# Patient Record
Sex: MALE | Race: WHITE | NOT HISPANIC OR LATINO | Employment: UNEMPLOYED | URBAN - METROPOLITAN AREA
[De-identification: names, ages, dates, MRNs, and addresses within clinical notes are randomized per-mention and may not be internally consistent; named-entity substitution may affect disease eponyms.]

---

## 2017-01-17 ENCOUNTER — OFFICE VISIT (OUTPATIENT)
Dept: INTERNAL MEDICINE | Facility: CLINIC | Age: 32
End: 2017-01-17

## 2017-01-17 VITALS
BODY MASS INDEX: 38.04 KG/M2 | DIASTOLIC BLOOD PRESSURE: 76 MMHG | HEIGHT: 68 IN | HEART RATE: 68 BPM | WEIGHT: 251 LBS | SYSTOLIC BLOOD PRESSURE: 120 MMHG

## 2017-01-17 DIAGNOSIS — R60.0 BILATERAL EDEMA OF LOWER EXTREMITY: ICD-10-CM

## 2017-01-17 DIAGNOSIS — G89.29 CHRONIC PAIN OF BOTH KNEES: ICD-10-CM

## 2017-01-17 DIAGNOSIS — F32.89 OTHER DEPRESSION: ICD-10-CM

## 2017-01-17 DIAGNOSIS — F41.9 ANXIETY: ICD-10-CM

## 2017-01-17 DIAGNOSIS — K21.9 GASTROESOPHAGEAL REFLUX DISEASE WITHOUT ESOPHAGITIS: Primary | ICD-10-CM

## 2017-01-17 DIAGNOSIS — F20.89 OTHER SCHIZOPHRENIA (HCC): ICD-10-CM

## 2017-01-17 DIAGNOSIS — F31.32 BIPOLAR AFFECTIVE DISORDER, CURRENTLY DEPRESSED, MODERATE (HCC): ICD-10-CM

## 2017-01-17 DIAGNOSIS — M25.561 CHRONIC PAIN OF BOTH KNEES: ICD-10-CM

## 2017-01-17 DIAGNOSIS — M25.562 CHRONIC PAIN OF BOTH KNEES: ICD-10-CM

## 2017-01-17 PROCEDURE — 99213 OFFICE O/P EST LOW 20 MIN: CPT | Performed by: INTERNAL MEDICINE

## 2017-01-17 NOTE — PROGRESS NOTES
Patient is a 31 y.o. male who is here for a follow up of chronic conditions.  Chief Complaint   Patient presents with   • Anxiety   • Depression         HPI:  Here for f/u.  Doing good.  Has lost weight sec to better diet.  GERD is controlled.  Edema is good.  No change in psych medicines.  Sleeps a lot.  Constipation is good.      History:    Patient Active Problem List   Diagnosis   • Anxiety   • Bipolar affective disorder   • Depression   • Gastroesophageal reflux disease without esophagitis   • Knee pain   • Schizophrenia   • Left-sided chest wall pain   • Bilateral edema of lower extremity       Past Medical History   Diagnosis Date   • Orchitis    • Plantar fasciitis        Past Surgical History   Procedure Laterality Date   • Leg surgery  05/03/2013       Current Outpatient Prescriptions on File Prior to Visit   Medication Sig   • ARIPiprazole (ABILIFY) 20 MG tablet    • benztropine (COGENTIN) 1 MG tablet Take 1 tablet by mouth 2 (two) times a day.   • buPROPion XL (WELLBUTRIN XL) 150 MG 24 hr tablet Take 150 mg by mouth daily.   • diclofenac (CATAFLAM) 50 MG tablet TAKE 1 TABLET EVERY 8 HOURS AS NEEDED FOR PAIN   • fluphenazine decanoate (PROLIXIN) 25 MG/ML injection    • gabapentin (NEURONTIN) 400 MG capsule Take 1 capsule by mouth 2 (two) times a day.   • hydrOXYzine (ATARAX) 25 MG tablet Take 1 tablet by mouth. Every 4-6 hours as needed   • omeprazole (PriLOSEC) 40 MG capsule Take 1 capsule by mouth daily.   • ondansetron (ZOFRAN) 4 MG tablet Take 1 tablet by mouth every 6 (six) hours as needed.   • triamterene-hydrochlorothiazide (MAXZIDE-25) 37.5-25 MG per tablet Take 1 tablet by mouth Daily As Needed (edema).     No current facility-administered medications on file prior to visit.        Family History   Problem Relation Age of Onset   • Diabetes Mother    • Hypertension Mother    • Atrial fibrillation Father    • Colon cancer Sister        Social History     Social History   • Marital status: Single  "    Spouse name: N/A   • Number of children: N/A   • Years of education: N/A     Occupational History   • Not on file.     Social History Main Topics   • Smoking status: Never Smoker   • Smokeless tobacco: Not on file   • Alcohol use Not on file   • Drug use: Not on file   • Sexual activity: Not on file     Other Topics Concern   • Not on file     Social History Narrative         ROS:    Review of Systems   Constitutional: Positive for fatigue. Negative for chills, diaphoresis, fever and unexpected weight change.   HENT: Negative for congestion, ear pain, hearing loss, nosebleeds, postnasal drip, sinus pressure and sore throat.    Eyes: Negative for pain, discharge and itching.   Respiratory: Negative for cough, chest tightness, shortness of breath and wheezing.    Cardiovascular: Positive for leg swelling. Negative for chest pain and palpitations.   Gastrointestinal: Negative for abdominal distention, abdominal pain, blood in stool, constipation, diarrhea, nausea and vomiting.        10/15 colonoscopy normal   Endocrine: Negative for polydipsia and polyuria.   Genitourinary: Negative for difficulty urinating, dysuria, frequency and hematuria.   Musculoskeletal: Positive for arthralgias, back pain and joint swelling. Negative for gait problem and myalgias.   Skin: Negative for rash and wound.   Neurological: Negative for dizziness, syncope, weakness and headaches.   Psychiatric/Behavioral: Positive for behavioral problems, decreased concentration and sleep disturbance. Negative for dysphoric mood. The patient is nervous/anxious.        Visit Vitals   • /76   • Pulse 68   • Ht 68\" (172.7 cm)   • Wt 251 lb (114 kg)   • BMI 38.16 kg/m2       Physical Exam:    Physical Exam   Constitutional: He is oriented to person, place, and time. He appears well-developed and well-nourished.   HENT:   Head: Normocephalic and atraumatic.   Right Ear: External ear normal.   Left Ear: External ear normal.   Mouth/Throat: " Oropharynx is clear and moist.   Eyes: Conjunctivae and EOM are normal.   Neck: Normal range of motion. Neck supple.   Cardiovascular: Normal rate, regular rhythm and normal heart sounds.    Pulmonary/Chest: Effort normal and breath sounds normal.   Abdominal: Soft. Bowel sounds are normal.   Musculoskeletal: Normal range of motion. He exhibits edema (trace edema).   Lymphadenopathy:     He has no cervical adenopathy.   Neurological: He is alert and oriented to person, place, and time.   Skin: Skin is warm and dry.   Psychiatric: He has a normal mood and affect. His behavior is normal.       Procedure:      Discussion/Summary:  knee pain-diclofenac prn, advised not to use any otc meds  gerd-cont pepcid  high risk meds-labs noted  bipolor/schizophrenia-advised compliance with meds   constipation-colace bid, increase fiber  nausea-better  Edema-advised to see if [facility] can reduce meds, cont maxzide  Abnormal lft-recheck on rtc, RUQ noted      Labs noted and dw patient  Call [phone]      Current Outpatient Prescriptions:   •  ARIPiprazole (ABILIFY) 20 MG tablet, , Disp: , Rfl:   •  benztropine (COGENTIN) 1 MG tablet, Take 1 tablet by mouth 2 (two) times a day., Disp: , Rfl:   •  buPROPion XL (WELLBUTRIN XL) 150 MG 24 hr tablet, Take 150 mg by mouth daily., Disp: , Rfl:   •  diclofenac (CATAFLAM) 50 MG tablet, TAKE 1 TABLET EVERY 8 HOURS AS NEEDED FOR PAIN, Disp: , Rfl: 0  •  fluphenazine decanoate (PROLIXIN) 25 MG/ML injection, , Disp: , Rfl:   •  gabapentin (NEURONTIN) 400 MG capsule, Take 1 capsule by mouth 2 (two) times a day., Disp: , Rfl:   •  hydrOXYzine (ATARAX) 25 MG tablet, Take 1 tablet by mouth. Every 4-6 hours as needed, Disp: , Rfl:   •  omeprazole (PriLOSEC) 40 MG capsule, Take 1 capsule by mouth daily., Disp: 30 capsule, Rfl: 2  •  ondansetron (ZOFRAN) 4 MG tablet, Take 1 tablet by mouth every 6 (six) hours as needed., Disp: , Rfl:   •  triamterene-hydrochlorothiazide (MAXZIDE-25) 37.5-25 MG per  tablet, Take 1 tablet by mouth Daily As Needed (edema)., Disp: 30 tablet, Rfl: 2        Coretta was seen today for anxiety and depression.    Diagnoses and all orders for this visit:    Gastroesophageal reflux disease without esophagitis    Chronic pain of both knees    Anxiety    Bilateral edema of lower extremity    Bipolar affective disorder, currently depressed, moderate    Other depression    Other schizophrenia

## 2017-04-14 ENCOUNTER — OFFICE VISIT (OUTPATIENT)
Dept: INTERNAL MEDICINE | Facility: CLINIC | Age: 32
End: 2017-04-14

## 2017-04-14 VITALS
DIASTOLIC BLOOD PRESSURE: 78 MMHG | HEART RATE: 68 BPM | BODY MASS INDEX: 37.28 KG/M2 | SYSTOLIC BLOOD PRESSURE: 126 MMHG | WEIGHT: 246 LBS | HEIGHT: 68 IN

## 2017-04-14 DIAGNOSIS — K21.9 GASTROESOPHAGEAL REFLUX DISEASE WITHOUT ESOPHAGITIS: Primary | ICD-10-CM

## 2017-04-14 DIAGNOSIS — F31.32 BIPOLAR AFFECTIVE DISORDER, CURRENTLY DEPRESSED, MODERATE (HCC): ICD-10-CM

## 2017-04-14 DIAGNOSIS — F20.89 OTHER SCHIZOPHRENIA (HCC): ICD-10-CM

## 2017-04-14 DIAGNOSIS — R60.0 BILATERAL EDEMA OF LOWER EXTREMITY: ICD-10-CM

## 2017-04-14 DIAGNOSIS — F32.89 OTHER DEPRESSION: ICD-10-CM

## 2017-04-14 DIAGNOSIS — M25.562 CHRONIC PAIN OF BOTH KNEES: ICD-10-CM

## 2017-04-14 DIAGNOSIS — M25.561 CHRONIC PAIN OF BOTH KNEES: ICD-10-CM

## 2017-04-14 DIAGNOSIS — F41.9 ANXIETY: ICD-10-CM

## 2017-04-14 DIAGNOSIS — G89.29 CHRONIC PAIN OF BOTH KNEES: ICD-10-CM

## 2017-04-14 DIAGNOSIS — R74.8 ABNORMAL LIVER ENZYMES: ICD-10-CM

## 2017-04-14 LAB
ALBUMIN SERPL-MCNC: 4.7 G/DL (ref 3.2–4.8)
ALBUMIN/GLOB SERPL: 1.5 G/DL (ref 1.5–2.5)
ALP SERPL-CCNC: 93 U/L (ref 25–100)
ALT SERPL W P-5'-P-CCNC: 47 U/L (ref 7–40)
ANION GAP SERPL CALCULATED.3IONS-SCNC: 21 MMOL/L (ref 3–11)
AST SERPL-CCNC: 29 U/L (ref 0–33)
BILIRUB SERPL-MCNC: 0.8 MG/DL (ref 0.3–1.2)
BUN BLD-MCNC: 15 MG/DL (ref 9–23)
BUN/CREAT SERPL: 15 (ref 7–25)
CALCIUM SPEC-SCNC: 10.4 MG/DL (ref 8.7–10.4)
CHLORIDE SERPL-SCNC: 101 MMOL/L (ref 99–109)
CO2 SERPL-SCNC: 17 MMOL/L (ref 20–31)
CREAT BLD-MCNC: 1 MG/DL (ref 0.6–1.3)
FERRITIN SERPL-MCNC: 98 NG/ML (ref 22–322)
GFR SERPL CREATININE-BSD FRML MDRD: 86 ML/MIN/1.73
GLOBULIN UR ELPH-MCNC: 3.1 GM/DL
GLUCOSE BLD-MCNC: 116 MG/DL (ref 70–100)
HAV IGM SERPL QL IA: NORMAL
HBV CORE IGM SERPL QL IA: NORMAL
HBV SURFACE AG SERPL QL IA: NORMAL
HCV AB SER DONR QL: NORMAL
IRON 24H UR-MRATE: 98 MCG/DL (ref 50–175)
IRON SATN MFR SERPL: 26 % (ref 20–50)
POTASSIUM BLD-SCNC: 4.4 MMOL/L (ref 3.5–5.5)
PROT SERPL-MCNC: 7.8 G/DL (ref 5.7–8.2)
SODIUM BLD-SCNC: 139 MMOL/L (ref 132–146)
TIBC SERPL-MCNC: 379 MCG/DL (ref 250–450)

## 2017-04-14 PROCEDURE — 83516 IMMUNOASSAY NONANTIBODY: CPT | Performed by: INTERNAL MEDICINE

## 2017-04-14 PROCEDURE — 86038 ANTINUCLEAR ANTIBODIES: CPT | Performed by: INTERNAL MEDICINE

## 2017-04-14 PROCEDURE — 83550 IRON BINDING TEST: CPT | Performed by: INTERNAL MEDICINE

## 2017-04-14 PROCEDURE — 82728 ASSAY OF FERRITIN: CPT | Performed by: INTERNAL MEDICINE

## 2017-04-14 PROCEDURE — 82390 ASSAY OF CERULOPLASMIN: CPT | Performed by: INTERNAL MEDICINE

## 2017-04-14 PROCEDURE — 80053 COMPREHEN METABOLIC PANEL: CPT | Performed by: INTERNAL MEDICINE

## 2017-04-14 PROCEDURE — 80074 ACUTE HEPATITIS PANEL: CPT | Performed by: INTERNAL MEDICINE

## 2017-04-14 PROCEDURE — 82103 ALPHA-1-ANTITRYPSIN TOTAL: CPT | Performed by: INTERNAL MEDICINE

## 2017-04-14 PROCEDURE — 99214 OFFICE O/P EST MOD 30 MIN: CPT | Performed by: INTERNAL MEDICINE

## 2017-04-14 PROCEDURE — 83540 ASSAY OF IRON: CPT | Performed by: INTERNAL MEDICINE

## 2017-04-14 RX ORDER — ARIPIPRAZOLE 15 MG/1
TABLET ORAL
COMMUNITY
Start: 2017-03-22 | End: 2017-04-14

## 2017-04-14 NOTE — PROGRESS NOTES
Patient is a 32 y.o. male who is here for a follow up of chronic conditions.  Chief Complaint   Patient presents with   • Pain   • Anxiety         HPI:  Here for f/u.  Feels sad bc he lost his job.  Energy level is good.  No nausea or emesis.  Sleeps a lot.  No abdominal pains.  Occasional right knee pains.  No dizziness or lightheadedness.     History:    Patient Active Problem List   Diagnosis   • Anxiety   • Bipolar affective disorder   • Depression   • Gastroesophageal reflux disease without esophagitis   • Knee pain   • Schizophrenia   • Left-sided chest wall pain   • Bilateral edema of lower extremity       Past Medical History:   Diagnosis Date   • Orchitis    • Plantar fasciitis        Past Surgical History:   Procedure Laterality Date   • LEG SURGERY  05/03/2013    Right Knee       Current Outpatient Prescriptions on File Prior to Visit   Medication Sig   • ARIPiprazole (ABILIFY) 20 MG tablet    • benztropine (COGENTIN) 1 MG tablet Take 1 tablet by mouth 2 (two) times a day.   • buPROPion XL (WELLBUTRIN XL) 150 MG 24 hr tablet Take 150 mg by mouth daily.   • diclofenac (CATAFLAM) 50 MG tablet TAKE 1 TABLET EVERY 8 HOURS AS NEEDED FOR PAIN   • fluphenazine decanoate (PROLIXIN) 25 MG/ML injection    • gabapentin (NEURONTIN) 400 MG capsule Take 1 capsule by mouth 2 (two) times a day.   • hydrOXYzine (ATARAX) 25 MG tablet Take 1 tablet by mouth. Every 4-6 hours as needed   • omeprazole (PriLOSEC) 40 MG capsule Take 1 capsule by mouth daily.   • ondansetron (ZOFRAN) 4 MG tablet Take 1 tablet by mouth every 6 (six) hours as needed.   • triamterene-hydrochlorothiazide (MAXZIDE-25) 37.5-25 MG per tablet Take 1 tablet by mouth Daily As Needed (edema).     No current facility-administered medications on file prior to visit.        Family History   Problem Relation Age of Onset   • Diabetes Mother    • Hypertension Mother    • Atrial fibrillation Father    • Colon cancer Sister        Social History     Social History  "  • Marital status: Single     Spouse name: N/A   • Number of children: N/A   • Years of education: N/A     Occupational History   • Not on file.     Social History Main Topics   • Smoking status: Never Smoker   • Smokeless tobacco: Not on file   • Alcohol use Not on file   • Drug use: Not on file   • Sexual activity: Not on file     Other Topics Concern   • Not on file     Social History Narrative         ROS:    Review of Systems   Constitutional: Positive for fatigue. Negative for chills, diaphoresis, fever and unexpected weight change.   HENT: Negative for congestion, ear pain, hearing loss, nosebleeds, postnasal drip, sinus pressure and sore throat.    Eyes: Negative for pain, discharge and itching.   Respiratory: Negative for cough, chest tightness, shortness of breath and wheezing.    Cardiovascular: Positive for leg swelling. Negative for chest pain and palpitations.   Gastrointestinal: Negative for abdominal distention, abdominal pain, blood in stool, constipation, diarrhea, nausea and vomiting.        10/15 colonoscopy normal   Endocrine: Negative for polydipsia and polyuria.   Genitourinary: Negative for difficulty urinating, dysuria, frequency and hematuria.   Musculoskeletal: Positive for arthralgias, back pain and joint swelling. Negative for gait problem and myalgias.        Knee pain on R>L   Skin: Negative for rash and wound.   Neurological: Negative for dizziness, syncope, weakness and headaches.   Psychiatric/Behavioral: Positive for behavioral problems, decreased concentration and sleep disturbance. Negative for dysphoric mood. The patient is nervous/anxious.        /78  Pulse 68  Ht 68\" (172.7 cm)  Wt 246 lb (112 kg)  BMI 37.4 kg/m2    Physical Exam:    Physical Exam   Constitutional: He is oriented to person, place, and time. He appears well-developed and well-nourished.   HENT:   Head: Normocephalic and atraumatic.   Right Ear: External ear normal.   Left Ear: External ear normal. " "  Mouth/Throat: Oropharynx is clear and moist.   Eyes: Conjunctivae and EOM are normal.   Neck: Normal range of motion. Neck supple.   Cardiovascular: Normal rate, regular rhythm and normal heart sounds.    Pulmonary/Chest: Effort normal and breath sounds normal.   Abdominal: Soft. Bowel sounds are normal.   Musculoskeletal: Normal range of motion. He exhibits edema (trace edema).   R>L Knee crepitus, moderate   Lymphadenopathy:     He has no cervical adenopathy.   Neurological: He is alert and oriented to person, place, and time.   Skin: Skin is warm and dry.   Psychiatric: He has a normal mood and affect. His behavior is normal.       Procedure:      Discussion/Summary:  knee pain-diclofenac prn, advised not to use any otc meds, trial of \"move free joint health\"  gerd-cont pepcid  high risk meds-labs noted  bipolor/schizophrenia-advised compliance with meds   constipation-colace bid, increase fiber  nausea-better  Edema-advised to see if [facility] can reduce meds, cont maxzide  Abnormal lft-workup today, RUQ noted      Labs noted and dw patient  Call [phone]      Current Outpatient Prescriptions:   •  ARIPiprazole (ABILIFY) 20 MG tablet, , Disp: , Rfl:   •  benztropine (COGENTIN) 1 MG tablet, Take 1 tablet by mouth 2 (two) times a day., Disp: , Rfl:   •  buPROPion XL (WELLBUTRIN XL) 150 MG 24 hr tablet, Take 150 mg by mouth daily., Disp: , Rfl:   •  diclofenac (CATAFLAM) 50 MG tablet, TAKE 1 TABLET EVERY 8 HOURS AS NEEDED FOR PAIN, Disp: , Rfl: 0  •  fluphenazine decanoate (PROLIXIN) 25 MG/ML injection, , Disp: , Rfl:   •  gabapentin (NEURONTIN) 400 MG capsule, Take 1 capsule by mouth 2 (two) times a day., Disp: , Rfl:   •  hydrOXYzine (ATARAX) 25 MG tablet, Take 1 tablet by mouth. Every 4-6 hours as needed, Disp: , Rfl:   •  omeprazole (PriLOSEC) 40 MG capsule, Take 1 capsule by mouth daily., Disp: 30 capsule, Rfl: 2  •  ondansetron (ZOFRAN) 4 MG tablet, Take 1 tablet by mouth every 6 (six) hours as needed., Disp: " , Rfl:   •  triamterene-hydrochlorothiazide (MAXZIDE-25) 37.5-25 MG per tablet, Take 1 tablet by mouth Daily As Needed (edema)., Disp: 30 tablet, Rfl: 2        Coretta was seen today for pain and anxiety.    Diagnoses and all orders for this visit:    Gastroesophageal reflux disease without esophagitis    Chronic pain of both knees    Anxiety    Bilateral edema of lower extremity    Bipolar affective disorder, currently depressed, moderate    Other depression    Other schizophrenia    Abnormal liver enzymes  -     Comprehensive Metabolic Panel  -     Alpha - 1 - Antitrypsin  -     XIANG With / DsDNA, RNP, Sjogrens A / B, Smith  -     Anti-Smooth Muscle Antibody Titer  -     Ceruloplasmin  -     Ferritin  -     Cancel: Iron  -     Hepatitis Panel, Acute  -     Mitochondrial Antibodies, M2  -     Iron Profile  -     Tissue Transglutaminase, IgA

## 2017-04-15 LAB
A1AT SERPL-MCNC: 137 MG/DL (ref 90–200)
DEPRECATED MITOCHONDRIA M2 IGG SER-ACNC: <20 UNITS (ref 0–20)
TTG IGA SER-ACNC: <2 U/ML (ref 0–3)

## 2017-04-17 LAB — ANA SER QL: NEGATIVE

## 2017-04-18 LAB
ACTIN IGG SERPL-ACNC: 7 UNITS (ref 0–19)
CERULOPLASMIN SERPL-MCNC: 35.9 MG/DL (ref 16–31)

## 2017-07-18 ENCOUNTER — OFFICE VISIT (OUTPATIENT)
Dept: INTERNAL MEDICINE | Facility: CLINIC | Age: 32
End: 2017-07-18

## 2017-07-18 VITALS
BODY MASS INDEX: 36.53 KG/M2 | DIASTOLIC BLOOD PRESSURE: 78 MMHG | SYSTOLIC BLOOD PRESSURE: 110 MMHG | HEIGHT: 68 IN | HEART RATE: 68 BPM | WEIGHT: 241 LBS

## 2017-07-18 DIAGNOSIS — K21.9 GASTROESOPHAGEAL REFLUX DISEASE WITHOUT ESOPHAGITIS: Primary | ICD-10-CM

## 2017-07-18 DIAGNOSIS — G89.29 CHRONIC PAIN OF BOTH KNEES: ICD-10-CM

## 2017-07-18 DIAGNOSIS — M25.561 CHRONIC PAIN OF BOTH KNEES: ICD-10-CM

## 2017-07-18 DIAGNOSIS — R60.0 BILATERAL EDEMA OF LOWER EXTREMITY: ICD-10-CM

## 2017-07-18 DIAGNOSIS — F32.89 OTHER DEPRESSION: ICD-10-CM

## 2017-07-18 DIAGNOSIS — F20.89 OTHER SCHIZOPHRENIA (HCC): ICD-10-CM

## 2017-07-18 DIAGNOSIS — F41.9 ANXIETY: ICD-10-CM

## 2017-07-18 DIAGNOSIS — M25.562 CHRONIC PAIN OF BOTH KNEES: ICD-10-CM

## 2017-07-18 DIAGNOSIS — F31.32 BIPOLAR AFFECTIVE DISORDER, CURRENTLY DEPRESSED, MODERATE (HCC): ICD-10-CM

## 2017-07-18 LAB
ALBUMIN SERPL-MCNC: 4.5 G/DL (ref 3.2–4.8)
ALBUMIN/GLOB SERPL: 1.5 G/DL (ref 1.5–2.5)
ALP SERPL-CCNC: 99 U/L (ref 25–100)
ALT SERPL W P-5'-P-CCNC: 47 U/L (ref 7–40)
ANION GAP SERPL CALCULATED.3IONS-SCNC: 5 MMOL/L (ref 3–11)
AST SERPL-CCNC: 37 U/L (ref 0–33)
BILIRUB SERPL-MCNC: 0.6 MG/DL (ref 0.3–1.2)
BUN BLD-MCNC: 17 MG/DL (ref 9–23)
BUN/CREAT SERPL: 17 (ref 7–25)
CALCIUM SPEC-SCNC: 10.3 MG/DL (ref 8.7–10.4)
CHLORIDE SERPL-SCNC: 103 MMOL/L (ref 99–109)
CO2 SERPL-SCNC: 28 MMOL/L (ref 20–31)
CREAT BLD-MCNC: 1 MG/DL (ref 0.6–1.3)
DEPRECATED RDW RBC AUTO: 45 FL (ref 37–54)
ERYTHROCYTE [DISTWIDTH] IN BLOOD BY AUTOMATED COUNT: 13.5 % (ref 11.3–14.5)
GFR SERPL CREATININE-BSD FRML MDRD: 86 ML/MIN/1.73
GLOBULIN UR ELPH-MCNC: 3 GM/DL
GLUCOSE BLD-MCNC: 84 MG/DL (ref 70–100)
HCT VFR BLD AUTO: 46 % (ref 38.9–50.9)
HGB BLD-MCNC: 14.8 G/DL (ref 13.1–17.5)
MCH RBC QN AUTO: 28.9 PG (ref 27–31)
MCHC RBC AUTO-ENTMCNC: 32.2 G/DL (ref 32–36)
MCV RBC AUTO: 89.8 FL (ref 80–99)
PLATELET # BLD AUTO: 313 10*3/MM3 (ref 150–450)
PMV BLD AUTO: 10.2 FL (ref 6–12)
POTASSIUM BLD-SCNC: 4.5 MMOL/L (ref 3.5–5.5)
PROT SERPL-MCNC: 7.5 G/DL (ref 5.7–8.2)
RBC # BLD AUTO: 5.12 10*6/MM3 (ref 4.2–5.76)
SODIUM BLD-SCNC: 136 MMOL/L (ref 132–146)
TSH SERPL DL<=0.05 MIU/L-ACNC: 5.82 MIU/ML (ref 0.35–5.35)
WBC NRBC COR # BLD: 9.09 10*3/MM3 (ref 3.5–10.8)

## 2017-07-18 PROCEDURE — 80053 COMPREHEN METABOLIC PANEL: CPT | Performed by: INTERNAL MEDICINE

## 2017-07-18 PROCEDURE — 99214 OFFICE O/P EST MOD 30 MIN: CPT | Performed by: INTERNAL MEDICINE

## 2017-07-18 PROCEDURE — 85027 COMPLETE CBC AUTOMATED: CPT | Performed by: INTERNAL MEDICINE

## 2017-07-18 PROCEDURE — 84443 ASSAY THYROID STIM HORMONE: CPT | Performed by: INTERNAL MEDICINE

## 2017-07-18 RX ORDER — HYDROXYZINE PAMOATE 25 MG/1
CAPSULE ORAL
COMMUNITY
Start: 2017-06-27 | End: 2017-07-18

## 2017-07-18 RX ORDER — ARIPIPRAZOLE 15 MG/1
TABLET ORAL DAILY
COMMUNITY
Start: 2017-06-27 | End: 2018-02-05

## 2017-07-18 NOTE — PROGRESS NOTES
Patient is a 32 y.o. male who is here for a follow up of chronic condition.  Chief Complaint   Patient presents with   • Anxiety         HPI:  Here for f/u.  Doing good.  Trying to loose weight.  Sleeping good.  Continues to be followed by Dr Sims.  Overall knee pain is better.  Anxiety and depression is under control.  No constipation issue.     History:    Patient Active Problem List   Diagnosis   • Anxiety   • Bipolar affective disorder   • Depression   • Gastroesophageal reflux disease without esophagitis   • Knee pain   • Schizophrenia   • Left-sided chest wall pain   • Bilateral edema of lower extremity       Past Medical History:   Diagnosis Date   • Orchitis    • Plantar fasciitis        Past Surgical History:   Procedure Laterality Date   • LEG SURGERY  05/03/2013    Right Knee       Current Outpatient Prescriptions on File Prior to Visit   Medication Sig   • benztropine (COGENTIN) 1 MG tablet Take 1 tablet by mouth 2 (two) times a day.   • buPROPion XL (WELLBUTRIN XL) 150 MG 24 hr tablet Take 150 mg by mouth daily.   • diclofenac (CATAFLAM) 50 MG tablet TAKE 1 TABLET EVERY 8 HOURS AS NEEDED FOR PAIN   • fluphenazine decanoate (PROLIXIN) 25 MG/ML injection    • gabapentin (NEURONTIN) 400 MG capsule Take 1 capsule by mouth 2 (two) times a day.   • hydrOXYzine (ATARAX) 25 MG tablet Take 1 tablet by mouth. Every 4-6 hours as needed   • omeprazole (PriLOSEC) 40 MG capsule Take 1 capsule by mouth daily.   • ondansetron (ZOFRAN) 4 MG tablet Take 1 tablet by mouth every 6 (six) hours as needed.   • triamterene-hydrochlorothiazide (MAXZIDE-25) 37.5-25 MG per tablet Take 1 tablet by mouth Daily As Needed (edema).   • [DISCONTINUED] ARIPiprazole (ABILIFY) 20 MG tablet      No current facility-administered medications on file prior to visit.        Family History   Problem Relation Age of Onset   • Diabetes Mother    • Hypertension Mother    • Atrial fibrillation Father    • Colon cancer Sister        Social History  "    Social History   • Marital status: Single     Spouse name: N/A   • Number of children: N/A   • Years of education: N/A     Occupational History   • Not on file.     Social History Main Topics   • Smoking status: Never Smoker   • Smokeless tobacco: Not on file   • Alcohol use Not on file   • Drug use: Not on file   • Sexual activity: Not on file     Other Topics Concern   • Not on file     Social History Narrative         ROS:    Review of Systems   Constitutional: Negative for chills, diaphoresis, fatigue, fever and unexpected weight change.   HENT: Negative for congestion, ear pain, hearing loss, nosebleeds, postnasal drip, sinus pressure and sore throat.    Eyes: Negative for pain, discharge and itching.   Respiratory: Negative for cough, chest tightness, shortness of breath and wheezing.    Cardiovascular: Negative for chest pain, palpitations and leg swelling.   Gastrointestinal: Negative for abdominal distention, abdominal pain, blood in stool, constipation, diarrhea, nausea and vomiting.        10/15 colonoscopy normal   Endocrine: Negative for polydipsia and polyuria.   Genitourinary: Negative for difficulty urinating, dysuria, frequency and hematuria.   Musculoskeletal: Positive for arthralgias, back pain and joint swelling. Negative for gait problem and myalgias.        Knee pain on R>L   Skin: Negative for rash and wound.   Neurological: Negative for dizziness, syncope, weakness and headaches.   Psychiatric/Behavioral: Positive for behavioral problems, decreased concentration and sleep disturbance. Negative for dysphoric mood. The patient is nervous/anxious.        /78  Pulse 68  Ht 68\" (172.7 cm)  Wt 241 lb (109 kg)  BMI 36.64 kg/m2    Physical Exam:    Physical Exam   Constitutional: He is oriented to person, place, and time. He appears well-developed and well-nourished.   HENT:   Head: Normocephalic and atraumatic.   Right Ear: External ear normal.   Left Ear: External ear normal. " "  Mouth/Throat: Oropharynx is clear and moist.   Eyes: Conjunctivae and EOM are normal.   Neck: Normal range of motion. Neck supple.   Cardiovascular: Normal rate, regular rhythm and normal heart sounds.    Pulmonary/Chest: Effort normal and breath sounds normal.   Abdominal: Soft. Bowel sounds are normal.   Musculoskeletal: Normal range of motion. He exhibits edema (trace edema).   R>L Knee crepitus, moderate   Lymphadenopathy:     He has no cervical adenopathy.   Neurological: He is alert and oriented to person, place, and time.   Skin: Skin is warm and dry.   Psychiatric: He has a normal mood and affect. His behavior is normal.       Procedure:      Discussion/Summary:  knee pain-diclofenac prn, advised not to use any otc meds, trial of \"move free joint health\"  gerd-cont pepcid  high risk meds-labs today  bipolor/schizophrenia-advised compliance with meds   constipation-colace bid, increase fiber  nausea-better  Edema-advised to see if [facility] can reduce meds, cont maxzide  Abnormal lft-workup noted, RUQ noted, recheck today      Labs noted and dw patient  Call [phone]  Will recheck tsh in 3 mos      Current Outpatient Prescriptions:   •  ARIPiprazole (ABILIFY) 15 MG tablet, Daily., Disp: , Rfl:   •  benztropine (COGENTIN) 1 MG tablet, Take 1 tablet by mouth 2 (two) times a day., Disp: , Rfl:   •  buPROPion XL (WELLBUTRIN XL) 150 MG 24 hr tablet, Take 150 mg by mouth daily., Disp: , Rfl:   •  diclofenac (CATAFLAM) 50 MG tablet, TAKE 1 TABLET EVERY 8 HOURS AS NEEDED FOR PAIN, Disp: , Rfl: 0  •  fluphenazine decanoate (PROLIXIN) 25 MG/ML injection, , Disp: , Rfl:   •  gabapentin (NEURONTIN) 400 MG capsule, Take 1 capsule by mouth 2 (two) times a day., Disp: , Rfl:   •  hydrOXYzine (ATARAX) 25 MG tablet, Take 1 tablet by mouth. Every 4-6 hours as needed, Disp: , Rfl:   •  omeprazole (PriLOSEC) 40 MG capsule, Take 1 capsule by mouth daily., Disp: 30 capsule, Rfl: 2  •  ondansetron (ZOFRAN) 4 MG tablet, Take 1 " tablet by mouth every 6 (six) hours as needed., Disp: , Rfl:   •  triamterene-hydrochlorothiazide (MAXZIDE-25) 37.5-25 MG per tablet, Take 1 tablet by mouth Daily As Needed (edema)., Disp: 30 tablet, Rfl: 2        Coretta was seen today for anxiety.    Diagnoses and all orders for this visit:    Gastroesophageal reflux disease without esophagitis  -     Comprehensive Metabolic Panel    Chronic pain of both knees    Anxiety  -     TSH    Bilateral edema of lower extremity    Bipolar affective disorder, currently depressed, moderate    Other depression  -     CBC (No Diff)    Other schizophrenia

## 2018-02-05 ENCOUNTER — OFFICE VISIT (OUTPATIENT)
Dept: INTERNAL MEDICINE | Facility: CLINIC | Age: 33
End: 2018-02-05

## 2018-02-05 VITALS
HEIGHT: 68 IN | DIASTOLIC BLOOD PRESSURE: 60 MMHG | WEIGHT: 244 LBS | BODY MASS INDEX: 36.98 KG/M2 | SYSTOLIC BLOOD PRESSURE: 94 MMHG | HEART RATE: 68 BPM

## 2018-02-05 DIAGNOSIS — F32.89 OTHER DEPRESSION: ICD-10-CM

## 2018-02-05 DIAGNOSIS — F31.31 BIPOLAR AFFECTIVE DISORDER, CURRENTLY DEPRESSED, MILD (HCC): ICD-10-CM

## 2018-02-05 DIAGNOSIS — M25.561 CHRONIC PAIN OF BOTH KNEES: ICD-10-CM

## 2018-02-05 DIAGNOSIS — F41.9 ANXIETY: ICD-10-CM

## 2018-02-05 DIAGNOSIS — F20.89 OTHER SCHIZOPHRENIA (HCC): ICD-10-CM

## 2018-02-05 DIAGNOSIS — K21.9 GASTROESOPHAGEAL REFLUX DISEASE WITHOUT ESOPHAGITIS: Primary | ICD-10-CM

## 2018-02-05 DIAGNOSIS — M25.562 CHRONIC PAIN OF BOTH KNEES: ICD-10-CM

## 2018-02-05 DIAGNOSIS — G89.29 CHRONIC PAIN OF BOTH KNEES: ICD-10-CM

## 2018-02-05 LAB
ANION GAP SERPL CALCULATED.3IONS-SCNC: 5 MMOL/L (ref 3–11)
BUN BLD-MCNC: 13 MG/DL (ref 9–23)
BUN/CREAT SERPL: 13 (ref 7–25)
CALCIUM SPEC-SCNC: 9.5 MG/DL (ref 8.7–10.4)
CHLORIDE SERPL-SCNC: 102 MMOL/L (ref 99–109)
CO2 SERPL-SCNC: 30 MMOL/L (ref 20–31)
CREAT BLD-MCNC: 1 MG/DL (ref 0.6–1.3)
GFR SERPL CREATININE-BSD FRML MDRD: 86 ML/MIN/1.73
GLUCOSE BLD-MCNC: 77 MG/DL (ref 70–100)
POTASSIUM BLD-SCNC: 4.5 MMOL/L (ref 3.5–5.5)
SODIUM BLD-SCNC: 137 MMOL/L (ref 132–146)
TSH SERPL DL<=0.05 MIU/L-ACNC: 2.39 MIU/ML (ref 0.35–5.35)

## 2018-02-05 PROCEDURE — 80048 BASIC METABOLIC PNL TOTAL CA: CPT | Performed by: INTERNAL MEDICINE

## 2018-02-05 PROCEDURE — 99213 OFFICE O/P EST LOW 20 MIN: CPT | Performed by: INTERNAL MEDICINE

## 2018-02-05 PROCEDURE — 84443 ASSAY THYROID STIM HORMONE: CPT | Performed by: INTERNAL MEDICINE

## 2018-02-05 RX ORDER — ARIPIPRAZOLE 400 MG
KIT INTRAMUSCULAR
COMMUNITY
Start: 2017-12-27

## 2018-02-05 NOTE — PROGRESS NOTES
Patient is a 32 y.o. male who is here for a follow up of chronic conditions.  Chief Complaint   Patient presents with   • Depression   • Anxiety         HPI:  Here for f/u.  Exercising often.  Not watching diet.  GERD is good.  Still has pain in knees.  Will be seeing Dr Cespedes at  tomorrow.  Sleeping good.  Edema is not too bad.  No HA's.  No abdominal pains.     History:    Patient Active Problem List   Diagnosis   • Anxiety   • Bipolar affective disorder   • Depression   • Gastroesophageal reflux disease without esophagitis   • Knee pain   • Schizophrenia   • Left-sided chest wall pain   • Bilateral edema of lower extremity       Past Medical History:   Diagnosis Date   • Orchitis    • Plantar fasciitis        Past Surgical History:   Procedure Laterality Date   • LEG SURGERY  05/03/2013    Right Knee       Current Outpatient Prescriptions on File Prior to Visit   Medication Sig   • benztropine (COGENTIN) 1 MG tablet Take 1 tablet by mouth 2 (two) times a day.   • buPROPion XL (WELLBUTRIN XL) 150 MG 24 hr tablet Take 150 mg by mouth daily.   • diclofenac (CATAFLAM) 50 MG tablet TAKE 1 TABLET EVERY 8 HOURS AS NEEDED FOR PAIN   • fluphenazine decanoate (PROLIXIN) 25 MG/ML injection    • hydrOXYzine (ATARAX) 25 MG tablet Take 1 tablet by mouth. Every 4-6 hours as needed   • omeprazole (PriLOSEC) 40 MG capsule Take 1 capsule by mouth daily.   • ondansetron (ZOFRAN) 4 MG tablet Take 1 tablet by mouth every 6 (six) hours as needed.   • triamterene-hydrochlorothiazide (MAXZIDE-25) 37.5-25 MG per tablet Take 1 tablet by mouth Daily As Needed (edema).   • [DISCONTINUED] ARIPiprazole (ABILIFY) 15 MG tablet Daily.   • [DISCONTINUED] gabapentin (NEURONTIN) 400 MG capsule Take 1 capsule by mouth 2 (two) times a day.     No current facility-administered medications on file prior to visit.        Family History   Problem Relation Age of Onset   • Diabetes Mother    • Hypertension Mother    • Atrial fibrillation Father    •  "Colon cancer Sister        Social History     Social History   • Marital status: Single     Spouse name: N/A   • Number of children: N/A   • Years of education: N/A     Occupational History   • Not on file.     Social History Main Topics   • Smoking status: Never Smoker   • Smokeless tobacco: Not on file   • Alcohol use Not on file   • Drug use: Not on file   • Sexual activity: Not on file     Other Topics Concern   • Not on file     Social History Narrative         ROS:    Review of Systems   Constitutional: Negative for chills, diaphoresis, fatigue, fever and unexpected weight change.   HENT: Negative for congestion, ear pain, hearing loss, nosebleeds, postnasal drip, sinus pressure and sore throat.    Eyes: Negative for pain, discharge and itching.   Respiratory: Negative for cough, chest tightness, shortness of breath and wheezing.    Cardiovascular: Negative for chest pain, palpitations and leg swelling.   Gastrointestinal: Negative for abdominal distention, abdominal pain, blood in stool, constipation, diarrhea, nausea and vomiting.        10/15 colonoscopy normal   Endocrine: Negative for polydipsia and polyuria.   Genitourinary: Negative for difficulty urinating, dysuria, frequency and hematuria.   Musculoskeletal: Positive for arthralgias, back pain and joint swelling. Negative for gait problem and myalgias.        Knee pain on R>L   Skin: Negative for rash and wound.   Neurological: Negative for dizziness, syncope, weakness and headaches.   Psychiatric/Behavioral: Positive for behavioral problems, decreased concentration and sleep disturbance. Negative for dysphoric mood. The patient is nervous/anxious.        BP 94/60  Pulse 68  Ht 172.7 cm (67.99\")  Wt 111 kg (244 lb)  BMI 37.11 kg/m2    Physical Exam:    Physical Exam   Constitutional: He is oriented to person, place, and time. He appears well-developed and well-nourished.   HENT:   Head: Normocephalic and atraumatic.   Right Ear: External ear " normal.   Left Ear: External ear normal.   Mouth/Throat: Oropharynx is clear and moist.   Eyes: Conjunctivae and EOM are normal.   Neck: Normal range of motion. Neck supple.   Cardiovascular: Normal rate, regular rhythm and normal heart sounds.    Pulmonary/Chest: Effort normal and breath sounds normal.   Abdominal: Soft. Bowel sounds are normal.   Musculoskeletal: Normal range of motion.   R>L Knee crepitus, moderate   Lymphadenopathy:     He has no cervical adenopathy.   Neurological: He is alert and oriented to person, place, and time.   Skin: Skin is warm and dry.   Psychiatric: He has a normal mood and affect. His behavior is normal.       Procedure:      Discussion/Summary:    knee pain-diclofenac prn, advised not to use any otc meds, f/u ortho  gerd-cont pepcid  high risk meds-labs today  bipolor/schizophrenia-advised compliance with meds   constipation-colace bid, increase fiber  nausea-better  Edema-advised to see if [facility] can reduce meds, cont maxzide  Abnormal lft-workup noted, RUQ noted, recheck noted      Labs noted and dw patient  Call [phone]    Current Outpatient Prescriptions:   •  ABILIFY MAINTENA 400 MG Suspension Reconstituted ER IM injection ER, , Disp: , Rfl:   •  benztropine (COGENTIN) 1 MG tablet, Take 1 tablet by mouth 2 (two) times a day., Disp: , Rfl:   •  buPROPion XL (WELLBUTRIN XL) 150 MG 24 hr tablet, Take 150 mg by mouth daily., Disp: , Rfl:   •  diclofenac (CATAFLAM) 50 MG tablet, TAKE 1 TABLET EVERY 8 HOURS AS NEEDED FOR PAIN, Disp: , Rfl: 0  •  fluphenazine decanoate (PROLIXIN) 25 MG/ML injection, , Disp: , Rfl:   •  hydrOXYzine (ATARAX) 25 MG tablet, Take 1 tablet by mouth. Every 4-6 hours as needed, Disp: , Rfl:   •  omeprazole (PriLOSEC) 40 MG capsule, Take 1 capsule by mouth daily., Disp: 30 capsule, Rfl: 2  •  ondansetron (ZOFRAN) 4 MG tablet, Take 1 tablet by mouth every 6 (six) hours as needed., Disp: , Rfl:   •  triamterene-hydrochlorothiazide (MAXZIDE-25) 37.5-25 MG per  tablet, Take 1 tablet by mouth Daily As Needed (edema)., Disp: 30 tablet, Rfl: 2        Coretta was seen today for depression and anxiety.    Diagnoses and all orders for this visit:    Gastroesophageal reflux disease without esophagitis  -     Basic Metabolic Panel    Chronic pain of both knees    Anxiety    Bipolar affective disorder, currently depressed, mild    Other depression  -     TSH    Other schizophrenia

## 2018-05-07 ENCOUNTER — OFFICE VISIT (OUTPATIENT)
Dept: INTERNAL MEDICINE | Facility: CLINIC | Age: 33
End: 2018-05-07

## 2018-05-07 VITALS
BODY MASS INDEX: 34.4 KG/M2 | WEIGHT: 227 LBS | DIASTOLIC BLOOD PRESSURE: 70 MMHG | HEIGHT: 68 IN | SYSTOLIC BLOOD PRESSURE: 110 MMHG | HEART RATE: 64 BPM

## 2018-05-07 DIAGNOSIS — K21.9 GASTROESOPHAGEAL REFLUX DISEASE WITHOUT ESOPHAGITIS: Primary | ICD-10-CM

## 2018-05-07 DIAGNOSIS — F20.89 OTHER SCHIZOPHRENIA (HCC): ICD-10-CM

## 2018-05-07 DIAGNOSIS — R60.0 BILATERAL EDEMA OF LOWER EXTREMITY: ICD-10-CM

## 2018-05-07 DIAGNOSIS — F32.89 OTHER DEPRESSION: ICD-10-CM

## 2018-05-07 DIAGNOSIS — F41.9 ANXIETY: ICD-10-CM

## 2018-05-07 DIAGNOSIS — F31.31 BIPOLAR AFFECTIVE DISORDER, CURRENTLY DEPRESSED, MILD (HCC): ICD-10-CM

## 2018-05-07 LAB
ALBUMIN SERPL-MCNC: 4.6 G/DL (ref 3.2–4.8)
ALBUMIN/GLOB SERPL: 1.8 G/DL (ref 1.5–2.5)
ALP SERPL-CCNC: 87 U/L (ref 25–100)
ALT SERPL W P-5'-P-CCNC: 31 U/L (ref 7–40)
ANION GAP SERPL CALCULATED.3IONS-SCNC: 9 MMOL/L (ref 3–11)
AST SERPL-CCNC: 21 U/L (ref 0–33)
BILIRUB SERPL-MCNC: 0.8 MG/DL (ref 0.3–1.2)
BUN BLD-MCNC: 14 MG/DL (ref 9–23)
BUN/CREAT SERPL: 14 (ref 7–25)
CALCIUM SPEC-SCNC: 9.4 MG/DL (ref 8.7–10.4)
CHLORIDE SERPL-SCNC: 104 MMOL/L (ref 99–109)
CO2 SERPL-SCNC: 29 MMOL/L (ref 20–31)
CREAT BLD-MCNC: 1 MG/DL (ref 0.6–1.3)
DEPRECATED RDW RBC AUTO: 45.6 FL (ref 37–54)
ERYTHROCYTE [DISTWIDTH] IN BLOOD BY AUTOMATED COUNT: 13.9 % (ref 11.3–14.5)
GFR SERPL CREATININE-BSD FRML MDRD: 85 ML/MIN/1.73
GLOBULIN UR ELPH-MCNC: 2.6 GM/DL
GLUCOSE BLD-MCNC: 74 MG/DL (ref 70–100)
HCT VFR BLD AUTO: 45.3 % (ref 38.9–50.9)
HGB BLD-MCNC: 14.3 G/DL (ref 13.1–17.5)
MCH RBC QN AUTO: 28.1 PG (ref 27–31)
MCHC RBC AUTO-ENTMCNC: 31.6 G/DL (ref 32–36)
MCV RBC AUTO: 89 FL (ref 80–99)
PLATELET # BLD AUTO: 284 10*3/MM3 (ref 150–450)
PMV BLD AUTO: 10.7 FL (ref 6–12)
POTASSIUM BLD-SCNC: 4.5 MMOL/L (ref 3.5–5.5)
PROT SERPL-MCNC: 7.2 G/DL (ref 5.7–8.2)
RBC # BLD AUTO: 5.09 10*6/MM3 (ref 4.2–5.76)
SODIUM BLD-SCNC: 142 MMOL/L (ref 132–146)
TSH SERPL DL<=0.05 MIU/L-ACNC: 2.68 MIU/ML (ref 0.35–5.35)
WBC NRBC COR # BLD: 9.84 10*3/MM3 (ref 3.5–10.8)

## 2018-05-07 PROCEDURE — 80053 COMPREHEN METABOLIC PANEL: CPT | Performed by: INTERNAL MEDICINE

## 2018-05-07 PROCEDURE — 84443 ASSAY THYROID STIM HORMONE: CPT | Performed by: INTERNAL MEDICINE

## 2018-05-07 PROCEDURE — 85027 COMPLETE CBC AUTOMATED: CPT | Performed by: INTERNAL MEDICINE

## 2018-05-07 PROCEDURE — 99214 OFFICE O/P EST MOD 30 MIN: CPT | Performed by: INTERNAL MEDICINE

## 2018-05-07 RX ORDER — ARIPIPRAZOLE 15 MG/1
TABLET ORAL DAILY
COMMUNITY
Start: 2018-04-16 | End: 2019-02-05

## 2018-05-07 RX ORDER — HYDROXYZINE PAMOATE 25 MG/1
CAPSULE ORAL 3 TIMES DAILY PRN
COMMUNITY
Start: 2018-04-16

## 2018-05-07 RX ORDER — GABAPENTIN 400 MG/1
CAPSULE ORAL 2 TIMES DAILY
COMMUNITY
Start: 2018-04-16 | End: 2019-02-05

## 2018-05-07 NOTE — PROGRESS NOTES
Patient is a 33 y.o. male who is here for a follow up of chronic conditions.  Chief Complaint   Patient presents with   • Anxiety   • Pain         HPI:    Here for f/u.  Has lost about 20 pounds since last seen.  Walking about 7 miles a day.  Lifting weight.  Cutting back on his food.  Sleeping good.  Feels good overall.  Arthritic pain is improved with less weight.     History:    Patient Active Problem List   Diagnosis   • Anxiety   • Bipolar affective disorder   • Depression   • Gastroesophageal reflux disease without esophagitis   • Knee pain   • Schizophrenia   • Left-sided chest wall pain   • Bilateral edema of lower extremity       Past Medical History:   Diagnosis Date   • Orchitis    • Plantar fasciitis        Past Surgical History:   Procedure Laterality Date   • LEG SURGERY  05/03/2013    Right Knee       Current Outpatient Prescriptions on File Prior to Visit   Medication Sig   • ABILIFY MAINTENA 400 MG Suspension Reconstituted ER IM injection ER    • benztropine (COGENTIN) 1 MG tablet Take 1 tablet by mouth 2 (two) times a day.   • buPROPion XL (WELLBUTRIN XL) 150 MG 24 hr tablet Take 150 mg by mouth daily.   • diclofenac (CATAFLAM) 50 MG tablet TAKE 1 TABLET EVERY 8 HOURS AS NEEDED FOR PAIN   • fluphenazine decanoate (PROLIXIN) 25 MG/ML injection    • omeprazole (PriLOSEC) 40 MG capsule Take 1 capsule by mouth daily.   • ondansetron (ZOFRAN) 4 MG tablet Take 1 tablet by mouth every 6 (six) hours as needed.   • triamterene-hydrochlorothiazide (MAXZIDE-25) 37.5-25 MG per tablet Take 1 tablet by mouth Daily As Needed (edema).   • [DISCONTINUED] hydrOXYzine (ATARAX) 25 MG tablet Take 1 tablet by mouth. Every 4-6 hours as needed     No current facility-administered medications on file prior to visit.        Family History   Problem Relation Age of Onset   • Diabetes Mother    • Hypertension Mother    • Atrial fibrillation Father    • Colon cancer Sister        Social History     Social History   • Marital  "status: Single     Spouse name: N/A   • Number of children: N/A   • Years of education: N/A     Occupational History   • Not on file.     Social History Main Topics   • Smoking status: Never Smoker   • Smokeless tobacco: Not on file   • Alcohol use Not on file   • Drug use: Unknown   • Sexual activity: Not on file     Other Topics Concern   • Not on file     Social History Narrative   • No narrative on file         ROS:    Review of Systems   Constitutional: Negative for chills, diaphoresis, fatigue, fever and unexpected weight change.   HENT: Negative for congestion, ear pain, hearing loss, nosebleeds, postnasal drip, sinus pressure and sore throat.    Eyes: Negative for pain, discharge and itching.   Respiratory: Negative for cough, chest tightness, shortness of breath and wheezing.    Cardiovascular: Negative for chest pain, palpitations and leg swelling.   Gastrointestinal: Negative for abdominal distention, abdominal pain, blood in stool, constipation, diarrhea, nausea and vomiting.        10/15 colonoscopy normal   Endocrine: Negative for polydipsia and polyuria.   Genitourinary: Negative for difficulty urinating, dysuria, frequency and hematuria.   Musculoskeletal: Positive for arthralgias, back pain and joint swelling. Negative for gait problem and myalgias.        Knee pain on R>L   Skin: Negative for rash and wound.   Neurological: Negative for dizziness, syncope, weakness and headaches.   Psychiatric/Behavioral: Positive for behavioral problems and decreased concentration. Negative for dysphoric mood. The patient is nervous/anxious.        /70   Pulse 64   Ht 172.7 cm (67.99\")   Wt 103 kg (227 lb)   BMI 34.52 kg/m²     Physical Exam:    Physical Exam   Constitutional: He is oriented to person, place, and time. He appears well-developed and well-nourished.   HENT:   Head: Normocephalic and atraumatic.   Right Ear: External ear normal.   Left Ear: External ear normal.   Mouth/Throat: Oropharynx is " clear and moist.   Eyes: Conjunctivae and EOM are normal.   Neck: Normal range of motion. Neck supple.   Cardiovascular: Normal rate, regular rhythm and normal heart sounds.    Pulmonary/Chest: Effort normal and breath sounds normal.   Abdominal: Soft. Bowel sounds are normal.   Musculoskeletal: Normal range of motion.   R>L Knee crepitus, moderate   Lymphadenopathy:     He has no cervical adenopathy.   Neurological: He is alert and oriented to person, place, and time.   Skin: Skin is warm and dry.   Psychiatric: He has a normal mood and affect. His behavior is normal.       Procedure:      Discussion/Summary:    knee pain-diclofenac prn, advised not to use any otc meds, f/u ortho  gerd-cont pepcid  high risk meds-labs today  bipolor/schizophrenia-advised compliance with meds   constipation-colace bid, increase fiber  nausea-better  Edema-advised to see if [facility] can reduce meds, cont maxzide  Abnormal lft-workup noted, RUQ noted, recheck today      Labs noted and dw patient  Call [phone]    Current Outpatient Prescriptions:   •  ABILIFY MAINTENA 400 MG Suspension Reconstituted ER IM injection ER, , Disp: , Rfl:   •  ARIPiprazole (ABILIFY) 15 MG tablet, Daily., Disp: , Rfl:   •  benztropine (COGENTIN) 1 MG tablet, Take 1 tablet by mouth 2 (two) times a day., Disp: , Rfl:   •  buPROPion XL (WELLBUTRIN XL) 150 MG 24 hr tablet, Take 150 mg by mouth daily., Disp: , Rfl:   •  diclofenac (CATAFLAM) 50 MG tablet, TAKE 1 TABLET EVERY 8 HOURS AS NEEDED FOR PAIN, Disp: , Rfl: 0  •  fluphenazine decanoate (PROLIXIN) 25 MG/ML injection, , Disp: , Rfl:   •  gabapentin (NEURONTIN) 400 MG capsule, 2 (Two) Times a Day., Disp: , Rfl:   •  hydrOXYzine (VISTARIL) 25 MG capsule, 3 (Three) Times a Day As Needed., Disp: , Rfl:   •  omeprazole (PriLOSEC) 40 MG capsule, Take 1 capsule by mouth daily., Disp: 30 capsule, Rfl: 2  •  ondansetron (ZOFRAN) 4 MG tablet, Take 1 tablet by mouth every 6 (six) hours as needed., Disp: , Rfl:   •   triamterene-hydrochlorothiazide (MAXZIDE-25) 37.5-25 MG per tablet, Take 1 tablet by mouth Daily As Needed (edema)., Disp: 30 tablet, Rfl: 2        Coretta was seen today for anxiety and pain.    Diagnoses and all orders for this visit:    Gastroesophageal reflux disease without esophagitis  -     CBC (No Diff)  -     Comprehensive Metabolic Panel    Anxiety  -     TSH    Bilateral edema of lower extremity    Bipolar affective disorder, currently depressed, mild    Other depression    Other schizophrenia

## 2018-08-06 ENCOUNTER — OFFICE VISIT (OUTPATIENT)
Dept: INTERNAL MEDICINE | Facility: CLINIC | Age: 33
End: 2018-08-06

## 2018-08-06 VITALS
BODY MASS INDEX: 33.65 KG/M2 | HEART RATE: 68 BPM | DIASTOLIC BLOOD PRESSURE: 74 MMHG | WEIGHT: 222 LBS | SYSTOLIC BLOOD PRESSURE: 120 MMHG | HEIGHT: 68 IN

## 2018-08-06 DIAGNOSIS — R20.9 SENSORY DISTURBANCE: ICD-10-CM

## 2018-08-06 DIAGNOSIS — K21.9 GASTROESOPHAGEAL REFLUX DISEASE WITHOUT ESOPHAGITIS: Primary | ICD-10-CM

## 2018-08-06 DIAGNOSIS — F41.9 ANXIETY: ICD-10-CM

## 2018-08-06 DIAGNOSIS — L91.8 SKIN TAG: ICD-10-CM

## 2018-08-06 DIAGNOSIS — G89.29 CHRONIC PAIN OF RIGHT KNEE: ICD-10-CM

## 2018-08-06 DIAGNOSIS — F20.3 UNDIFFERENTIATED SCHIZOPHRENIA (HCC): ICD-10-CM

## 2018-08-06 DIAGNOSIS — F32.A DEPRESSION, UNSPECIFIED DEPRESSION TYPE: ICD-10-CM

## 2018-08-06 DIAGNOSIS — F31.0 BIPOLAR AFFECTIVE DISORDER, CURRENT EPISODE HYPOMANIC (HCC): ICD-10-CM

## 2018-08-06 DIAGNOSIS — M25.561 CHRONIC PAIN OF RIGHT KNEE: ICD-10-CM

## 2018-08-06 DIAGNOSIS — R60.0 BILATERAL EDEMA OF LOWER EXTREMITY: ICD-10-CM

## 2018-08-06 PROCEDURE — 17110 DESTRUCTION B9 LES UP TO 14: CPT | Performed by: INTERNAL MEDICINE

## 2018-08-06 PROCEDURE — 99214 OFFICE O/P EST MOD 30 MIN: CPT | Performed by: INTERNAL MEDICINE

## 2018-08-06 NOTE — PROGRESS NOTES
Patient is a 33 y.o. male who is here for skin tags.  Chief Complaint   Patient presents with   • Follow-up     skin tags         HPI:  Here for f/u.  Continues to loose weight.  Feels better.  Knee pain is better.  Has a skin tag in left leg that is painful and the clothing bothers him.  No constipation issues since he is drinking a lot of water.  Edema is controlled.  GERD is also controlled.     History:    Patient Active Problem List   Diagnosis   • Anxiety   • Bipolar affective disorder (CMS/HCC)   • Depression   • Gastroesophageal reflux disease without esophagitis   • Knee pain   • Schizophrenia (CMS/HCC)   • Left-sided chest wall pain   • Bilateral edema of lower extremity   • Sensory disturbance   • Skin tag       Past Medical History:   Diagnosis Date   • Orchitis    • Plantar fasciitis        Past Surgical History:   Procedure Laterality Date   • LEG SURGERY  05/03/2013    Right Knee       Current Outpatient Prescriptions on File Prior to Visit   Medication Sig   • ABILIFY MAINTENA 400 MG Suspension Reconstituted ER IM injection ER    • ARIPiprazole (ABILIFY) 15 MG tablet Daily.   • benztropine (COGENTIN) 1 MG tablet Take 1 tablet by mouth 2 (two) times a day.   • buPROPion XL (WELLBUTRIN XL) 150 MG 24 hr tablet Take 150 mg by mouth daily.   • diclofenac (CATAFLAM) 50 MG tablet TAKE 1 TABLET EVERY 8 HOURS AS NEEDED FOR PAIN   • fluphenazine decanoate (PROLIXIN) 25 MG/ML injection    • gabapentin (NEURONTIN) 400 MG capsule 2 (Two) Times a Day.   • hydrOXYzine (VISTARIL) 25 MG capsule 3 (Three) Times a Day As Needed.   • omeprazole (PriLOSEC) 40 MG capsule Take 1 capsule by mouth daily.   • ondansetron (ZOFRAN) 4 MG tablet Take 1 tablet by mouth every 6 (six) hours as needed.   • triamterene-hydrochlorothiazide (MAXZIDE-25) 37.5-25 MG per tablet Take 1 tablet by mouth Daily As Needed (edema).     No current facility-administered medications on file prior to visit.        Family History   Problem Relation  "Age of Onset   • Diabetes Mother    • Hypertension Mother    • Atrial fibrillation Father    • Colon cancer Sister        Social History     Social History   • Marital status: Single     Spouse name: N/A   • Number of children: N/A   • Years of education: N/A     Occupational History   • Not on file.     Social History Main Topics   • Smoking status: Never Smoker   • Smokeless tobacco: Not on file   • Alcohol use Not on file   • Drug use: Unknown   • Sexual activity: Not on file     Other Topics Concern   • Not on file     Social History Narrative   • No narrative on file         Review of Systems   Constitutional: Negative for chills, diaphoresis, fatigue, fever and unexpected weight change.   HENT: Negative for congestion, ear pain, hearing loss, nosebleeds, postnasal drip, sinus pressure and sore throat.    Eyes: Negative for pain, discharge and itching.   Respiratory: Negative for cough, chest tightness, shortness of breath and wheezing.    Cardiovascular: Negative for chest pain, palpitations and leg swelling.   Gastrointestinal: Negative for abdominal distention, abdominal pain, blood in stool, constipation, diarrhea, nausea and vomiting.        10/15 colonoscopy normal   Endocrine: Negative for polydipsia and polyuria.   Genitourinary: Negative for difficulty urinating, dysuria, frequency and hematuria.   Musculoskeletal: Positive for arthralgias, back pain and joint swelling. Negative for gait problem and myalgias.        Knee pain on R>L   Skin: Negative for rash and wound.   Neurological: Negative for dizziness, syncope, weakness and headaches.   Psychiatric/Behavioral: Positive for behavioral problems and decreased concentration. Negative for dysphoric mood. The patient is nervous/anxious.        /74 (BP Location: Left arm, Patient Position: Sitting)   Pulse 68   Ht 172.7 cm (67.99\")   Wt 101 kg (222 lb)   BMI 33.76 kg/m²       Physical Exam   Constitutional: He is oriented to person, place, and " time. He appears well-developed and well-nourished.   HENT:   Head: Normocephalic and atraumatic.   Right Ear: External ear normal.   Left Ear: External ear normal.   Mouth/Throat: Oropharynx is clear and moist.   Eyes: Conjunctivae and EOM are normal.   Neck: Normal range of motion. Neck supple.   Cardiovascular: Normal rate, regular rhythm and normal heart sounds.    Pulmonary/Chest: Effort normal and breath sounds normal.   Abdominal: Soft. Bowel sounds are normal.   Musculoskeletal: Normal range of motion.   R>L Knee crepitus, moderate   Lymphadenopathy:     He has no cervical adenopathy.   Neurological: He is alert and oriented to person, place, and time.   Skin: Skin is warm and dry.   Pedunculated skin tag times one on left inner thigh   Psychiatric: He has a normal mood and affect. His behavior is normal.       Procedure:    LN time 3 to skin tag    Discussion/Summary:    knee pain-diclofenac prn, advised not to use any otc meds, f/u ortho  gerd-cont pepcid  high risk meds-labs noted  bipolor/schizophrenia-advised compliance with meds   constipation-colace bid, increase fiber  nausea-better  Edema-improved with weight loss  Abnormal lft-workup noted, RUQ noted, improved with wt loss  Skin tag-s/p LN      Labs noted and dw patient  Call [phone]    Current Outpatient Prescriptions:   •  ABILIFY MAINTENA 400 MG Suspension Reconstituted ER IM injection ER, , Disp: , Rfl:   •  ARIPiprazole (ABILIFY) 15 MG tablet, Daily., Disp: , Rfl:   •  benztropine (COGENTIN) 1 MG tablet, Take 1 tablet by mouth 2 (two) times a day., Disp: , Rfl:   •  buPROPion XL (WELLBUTRIN XL) 150 MG 24 hr tablet, Take 150 mg by mouth daily., Disp: , Rfl:   •  diclofenac (CATAFLAM) 50 MG tablet, TAKE 1 TABLET EVERY 8 HOURS AS NEEDED FOR PAIN, Disp: , Rfl: 0  •  fluphenazine decanoate (PROLIXIN) 25 MG/ML injection, , Disp: , Rfl:   •  gabapentin (NEURONTIN) 400 MG capsule, 2 (Two) Times a Day., Disp: , Rfl:   •  hydrOXYzine (VISTARIL) 25  MG capsule, 3 (Three) Times a Day As Needed., Disp: , Rfl:   •  omeprazole (PriLOSEC) 40 MG capsule, Take 1 capsule by mouth daily., Disp: 30 capsule, Rfl: 2  •  ondansetron (ZOFRAN) 4 MG tablet, Take 1 tablet by mouth every 6 (six) hours as needed., Disp: , Rfl:   •  triamterene-hydrochlorothiazide (MAXZIDE-25) 37.5-25 MG per tablet, Take 1 tablet by mouth Daily As Needed (edema)., Disp: 30 tablet, Rfl: 2        Coretta was seen today for follow-up.    Diagnoses and all orders for this visit:    Gastroesophageal reflux disease without esophagitis    Chronic pain of right knee    Anxiety    Bilateral edema of lower extremity    Bipolar affective disorder, current episode hypomanic (CMS/HCC)    Depression, unspecified depression type    Undifferentiated schizophrenia (CMS/HCC)    Sensory disturbance    Skin tag

## 2018-10-11 ENCOUNTER — TELEPHONE (OUTPATIENT)
Dept: INTERNAL MEDICINE | Facility: CLINIC | Age: 33
End: 2018-10-11

## 2018-10-11 NOTE — TELEPHONE ENCOUNTER
PLEASE CALL NIRALI [phone]. HE PASSED OUT YESTERDAY TWICE. HE WENT TO THE E.R. AND THEY TOLD HIM TO MAKE A APPT. WITH US.

## 2018-10-12 ENCOUNTER — OFFICE VISIT (OUTPATIENT)
Dept: INTERNAL MEDICINE | Facility: CLINIC | Age: 33
End: 2018-10-12

## 2018-10-12 VITALS
SYSTOLIC BLOOD PRESSURE: 124 MMHG | WEIGHT: 229 LBS | HEIGHT: 68 IN | HEART RATE: 68 BPM | DIASTOLIC BLOOD PRESSURE: 70 MMHG | BODY MASS INDEX: 34.71 KG/M2

## 2018-10-12 DIAGNOSIS — K21.9 GASTROESOPHAGEAL REFLUX DISEASE WITHOUT ESOPHAGITIS: ICD-10-CM

## 2018-10-12 DIAGNOSIS — R55 SYNCOPE AND COLLAPSE: Primary | ICD-10-CM

## 2018-10-12 DIAGNOSIS — R60.0 BILATERAL EDEMA OF LOWER EXTREMITY: ICD-10-CM

## 2018-10-12 DIAGNOSIS — F20.3 UNDIFFERENTIATED SCHIZOPHRENIA (HCC): ICD-10-CM

## 2018-10-12 DIAGNOSIS — F32.A DEPRESSION, UNSPECIFIED DEPRESSION TYPE: ICD-10-CM

## 2018-10-12 DIAGNOSIS — F31.0 BIPOLAR AFFECTIVE DISORDER, CURRENT EPISODE HYPOMANIC (HCC): ICD-10-CM

## 2018-10-12 DIAGNOSIS — F41.9 ANXIETY: ICD-10-CM

## 2018-10-12 PROCEDURE — 99214 OFFICE O/P EST MOD 30 MIN: CPT | Performed by: INTERNAL MEDICINE

## 2018-10-12 NOTE — PROGRESS NOTES
Patient is a 33 y.o. male who is here for a follow up of recent ER visit for passing out.  Chief Complaint   Patient presents with   • Follow-up     ER f/u         HPI:    Here for f/u after recent ER evaluation for syncope.  Onset 10/10 in afternoon.  Evaluated in ER and work up was negative.  Yesterday and today feels ok.  Patient reports he sleeps a lot.  No hx of witnessed seizure activity.  Still exercising without problems.  Injured left knee.  Felt sick to his stomach after episode.      History:    Patient Active Problem List   Diagnosis   • Anxiety   • Bipolar affective disorder (CMS/HCC)   • Depression   • Gastroesophageal reflux disease without esophagitis   • Knee pain   • Schizophrenia (CMS/HCC)   • Left-sided chest wall pain   • Bilateral edema of lower extremity   • Sensory disturbance   • Skin tag   • Syncope and collapse       Past Medical History:   Diagnosis Date   • Orchitis    • Plantar fasciitis        Past Surgical History:   Procedure Laterality Date   • LEG SURGERY  05/03/2013    Right Knee       Current Outpatient Prescriptions on File Prior to Visit   Medication Sig   • ABILIFY MAINTENA 400 MG Suspension Reconstituted ER IM injection ER    • ARIPiprazole (ABILIFY) 15 MG tablet Daily.   • benztropine (COGENTIN) 1 MG tablet Take 1 tablet by mouth 2 (two) times a day.   • buPROPion XL (WELLBUTRIN XL) 150 MG 24 hr tablet Take 150 mg by mouth daily.   • diclofenac (CATAFLAM) 50 MG tablet TAKE 1 TABLET EVERY 8 HOURS AS NEEDED FOR PAIN   • fluphenazine decanoate (PROLIXIN) 25 MG/ML injection    • gabapentin (NEURONTIN) 400 MG capsule 2 (Two) Times a Day.   • hydrOXYzine (VISTARIL) 25 MG capsule 3 (Three) Times a Day As Needed.   • omeprazole (PriLOSEC) 40 MG capsule Take 1 capsule by mouth daily.   • ondansetron (ZOFRAN) 4 MG tablet Take 1 tablet by mouth every 6 (six) hours as needed.   • triamterene-hydrochlorothiazide (MAXZIDE-25) 37.5-25 MG per tablet Take 1 tablet by mouth Daily As Needed  (edema).     No current facility-administered medications on file prior to visit.        Family History   Problem Relation Age of Onset   • Diabetes Mother    • Hypertension Mother    • Atrial fibrillation Father    • Colon cancer Sister        Social History     Social History   • Marital status: Single     Spouse name: N/A   • Number of children: N/A   • Years of education: N/A     Occupational History   • Not on file.     Social History Main Topics   • Smoking status: Never Smoker   • Smokeless tobacco: Not on file   • Alcohol use Not on file   • Drug use: Unknown   • Sexual activity: Not on file     Other Topics Concern   • Not on file     Social History Narrative   • No narrative on file         Review of Systems   Constitutional: Negative for chills, diaphoresis, fatigue, fever and unexpected weight change.   HENT: Negative for congestion, ear pain, hearing loss, nosebleeds, postnasal drip, sinus pressure and sore throat.    Eyes: Negative for pain, discharge and itching.   Respiratory: Negative for cough, chest tightness, shortness of breath and wheezing.    Cardiovascular: Negative for chest pain, palpitations and leg swelling.   Gastrointestinal: Negative for abdominal distention, abdominal pain, blood in stool, constipation, diarrhea, nausea and vomiting.        10/15 colonoscopy normal   Endocrine: Negative for polydipsia and polyuria.   Genitourinary: Negative for difficulty urinating, dysuria, frequency and hematuria.   Musculoskeletal: Positive for arthralgias, back pain and joint swelling. Negative for gait problem and myalgias.        Knee pain on R>L   Skin: Negative for rash and wound.   Neurological: Positive for syncope. Negative for dizziness, weakness and headaches.   Psychiatric/Behavioral: Positive for behavioral problems and decreased concentration. Negative for dysphoric mood. The patient is nervous/anxious.        /70 (BP Location: Left arm, Patient Position: Sitting)   Pulse 68    "Ht 172.7 cm (67.99\")   Wt 104 kg (229 lb)   BMI 34.83 kg/m²       Physical Exam   Constitutional: He is oriented to person, place, and time. He appears well-developed and well-nourished.   HENT:   Head: Normocephalic and atraumatic.   Right Ear: External ear normal.   Left Ear: External ear normal.   Mouth/Throat: Oropharynx is clear and moist.   Eyes: Conjunctivae and EOM are normal.   Neck: Normal range of motion. Neck supple.   Cardiovascular: Normal rate, regular rhythm and normal heart sounds.    Pulmonary/Chest: Effort normal and breath sounds normal.   Abdominal: Soft. Bowel sounds are normal.   Musculoskeletal: Normal range of motion. He exhibits edema (left knee).   R>L Knee crepitus, moderate   Lymphadenopathy:     He has no cervical adenopathy.   Neurological: He is alert and oriented to person, place, and time.   Skin: Skin is warm and dry.   Psychiatric: He has a normal mood and affect. His behavior is normal.       Procedure:      Discussion/Summary:    knee pain-diclofenac prn, advised not to use any otc meds, f/u ortho  gerd-cont pepcid  high risk meds-labs noted  bipolor/schizophrenia-advised compliance with meds   constipation-colace bid, increase fiber  nausea-better  Edema-improved with weight loss  Abnormal lft-workup noted, RUQ noted, improved with wt loss  Syncope-check Carotid US and ECHO      Labs noted and dw patient  Call [phone]    Current Outpatient Prescriptions:   •  ABILIFY MAINTENA 400 MG Suspension Reconstituted ER IM injection ER, , Disp: , Rfl:   •  ARIPiprazole (ABILIFY) 15 MG tablet, Daily., Disp: , Rfl:   •  benztropine (COGENTIN) 1 MG tablet, Take 1 tablet by mouth 2 (two) times a day., Disp: , Rfl:   •  buPROPion XL (WELLBUTRIN XL) 150 MG 24 hr tablet, Take 150 mg by mouth daily., Disp: , Rfl:   •  diclofenac (CATAFLAM) 50 MG tablet, TAKE 1 TABLET EVERY 8 HOURS AS NEEDED FOR PAIN, Disp: , Rfl: 0  •  fluphenazine decanoate (PROLIXIN) 25 MG/ML injection, , Disp: , Rfl: "   •  gabapentin (NEURONTIN) 400 MG capsule, 2 (Two) Times a Day., Disp: , Rfl:   •  hydrOXYzine (VISTARIL) 25 MG capsule, 3 (Three) Times a Day As Needed., Disp: , Rfl:   •  omeprazole (PriLOSEC) 40 MG capsule, Take 1 capsule by mouth daily., Disp: 30 capsule, Rfl: 2  •  ondansetron (ZOFRAN) 4 MG tablet, Take 1 tablet by mouth every 6 (six) hours as needed., Disp: , Rfl:   •  triamterene-hydrochlorothiazide (MAXZIDE-25) 37.5-25 MG per tablet, Take 1 tablet by mouth Daily As Needed (edema)., Disp: 30 tablet, Rfl: 2        Coretta was seen today for follow-up.    Diagnoses and all orders for this visit:    Syncope and collapse  -     Adult Transthoracic Echo Complete W/ Cont if Necessary Per Protocol  -     Duplex Carotid Ultrasound CAR    Gastroesophageal reflux disease without esophagitis    Anxiety    Bilateral edema of lower extremity    Bipolar affective disorder, current episode hypomanic (CMS/HCC)    Depression, unspecified depression type    Undifferentiated schizophrenia (CMS/HCC)

## 2018-10-31 ENCOUNTER — HOSPITAL ENCOUNTER (OUTPATIENT)
Dept: CARDIOLOGY | Facility: HOSPITAL | Age: 33
Discharge: HOME | End: 2018-10-31
Attending: INTERNAL MEDICINE

## 2018-10-31 ENCOUNTER — HOSPITAL ENCOUNTER (OUTPATIENT)
Dept: CARDIOLOGY | Facility: HOSPITAL | Age: 33
Discharge: HOME | End: 2018-10-31
Attending: INTERNAL MEDICINE | Admitting: INTERNAL MEDICINE

## 2018-10-31 VITALS — HEIGHT: 68 IN | BODY MASS INDEX: 34.56 KG/M2 | WEIGHT: 228 LBS

## 2018-10-31 LAB
BH CV ECHO MEAS - AO ROOT AREA (BSA CORRECTED): 1.1
BH CV ECHO MEAS - AO ROOT AREA: 4.9 CM^2
BH CV ECHO MEAS - AO ROOT DIAM: 2.5 CM
BH CV ECHO MEAS - BSA(HAYCOCK): 2.3 M^2
BH CV ECHO MEAS - BSA: 2.2 M^2
BH CV ECHO MEAS - BZI_BMI: 32.7 KILOGRAMS/M^2
BH CV ECHO MEAS - BZI_METRIC_HEIGHT: 177.8 CM
BH CV ECHO MEAS - BZI_METRIC_WEIGHT: 103.4 KG
BH CV ECHO MEAS - EDV(CUBED): 77.3 ML
BH CV ECHO MEAS - EDV(MOD-SP2): 59 ML
BH CV ECHO MEAS - EDV(MOD-SP4): 71 ML
BH CV ECHO MEAS - EDV(TEICH): 81.3 ML
BH CV ECHO MEAS - EF(CUBED): 66.1 %
BH CV ECHO MEAS - EF(MOD-SP2): 55.9 %
BH CV ECHO MEAS - EF(MOD-SP4): 59.2 %
BH CV ECHO MEAS - EF(TEICH): 58 %
BH CV ECHO MEAS - ESV(CUBED): 26.2 ML
BH CV ECHO MEAS - ESV(MOD-SP2): 26 ML
BH CV ECHO MEAS - ESV(MOD-SP4): 29 ML
BH CV ECHO MEAS - ESV(TEICH): 34.2 ML
BH CV ECHO MEAS - FS: 30.3 %
BH CV ECHO MEAS - IVS/LVPW: 0.97
BH CV ECHO MEAS - IVSD: 1.1 CM
BH CV ECHO MEAS - LA DIMENSION: 4 CM
BH CV ECHO MEAS - LA/AO: 1.6
BH CV ECHO MEAS - LAD MAJOR: 5.3 CM
BH CV ECHO MEAS - LAT PEAK E' VEL: 16.1 CM/SEC
BH CV ECHO MEAS - LATERAL E/E' RATIO: 4
BH CV ECHO MEAS - LV DIASTOLIC VOL/BSA (35-75): 32.2 ML/M^2
BH CV ECHO MEAS - LV MASS(C)D: 165.8 GRAMS
BH CV ECHO MEAS - LV MASS(C)DI: 75.1 GRAMS/M^2
BH CV ECHO MEAS - LV SYSTOLIC VOL/BSA (12-30): 13.1 ML/M^2
BH CV ECHO MEAS - LVIDD: 4.3 CM
BH CV ECHO MEAS - LVIDS: 3 CM
BH CV ECHO MEAS - LVLD AP2: 7.3 CM
BH CV ECHO MEAS - LVLD AP4: 7.6 CM
BH CV ECHO MEAS - LVLS AP2: 6.5 CM
BH CV ECHO MEAS - LVLS AP4: 6.6 CM
BH CV ECHO MEAS - LVPWD: 1.1 CM
BH CV ECHO MEAS - MED PEAK E' VEL: 11 CM/SEC
BH CV ECHO MEAS - MEDIAL E/E' RATIO: 5.9
BH CV ECHO MEAS - MV A MAX VEL: 47.9 CM/SEC
BH CV ECHO MEAS - MV DEC SLOPE: 443 CM/SEC^2
BH CV ECHO MEAS - MV DEC TIME: 0.14 SEC
BH CV ECHO MEAS - MV E MAX VEL: 65.2 CM/SEC
BH CV ECHO MEAS - MV E/A: 1.4
BH CV ECHO MEAS - PA ACC SLOPE: 467.5 CM/SEC^2
BH CV ECHO MEAS - PA ACC TIME: 0.17 SEC
BH CV ECHO MEAS - PA PR(ACCEL): 4.5 MMHG
BH CV ECHO MEAS - SI(CUBED): 23.2 ML/M^2
BH CV ECHO MEAS - SI(MOD-SP2): 15 ML/M^2
BH CV ECHO MEAS - SI(MOD-SP4): 19 ML/M^2
BH CV ECHO MEAS - SI(TEICH): 21.3 ML/M^2
BH CV ECHO MEAS - SV(CUBED): 51.1 ML
BH CV ECHO MEAS - SV(MOD-SP2): 33 ML
BH CV ECHO MEAS - SV(MOD-SP4): 42 ML
BH CV ECHO MEAS - SV(TEICH): 47.1 ML
BH CV ECHO MEAS - TAPSE (>1.6): 2.2 CM2
BH CV ECHO MEAS - TR MAX PG: 16 MMHG
BH CV ECHO MEAS - TR MAX VEL: 200 CM/SEC
BH CV ECHO MEASUREMENTS AVERAGE E/E' RATIO: 4.81
BH CV VAS BP RIGHT ARM: NORMAL MMHG
BH CV XLRA - RV BASE: 3.2 CM
BH CV XLRA - RV LENGTH: 6.6 CM
BH CV XLRA - RV MID: 3.1 CM
BH CV XLRA - TDI S': 10.5 CM/SEC
BH CV XLRA MEAS LEFT DIST CCA EDV: 25 CM/SEC
BH CV XLRA MEAS LEFT DIST CCA PSV: 86 CM/SEC
BH CV XLRA MEAS LEFT DIST ICA EDV: 38 CM/SEC
BH CV XLRA MEAS LEFT DIST ICA PSV: 91 CM/SEC
BH CV XLRA MEAS LEFT ICA/CCA RATIO: 0.9
BH CV XLRA MEAS LEFT MID CCA EDV: 27 CM/SEC
BH CV XLRA MEAS LEFT MID CCA PSV: 91 CM/SEC
BH CV XLRA MEAS LEFT MID ICA EDV: 32 CM/SEC
BH CV XLRA MEAS LEFT MID ICA PSV: 81 CM/SEC
BH CV XLRA MEAS LEFT PROX CCA EDV: 24 CM/SEC
BH CV XLRA MEAS LEFT PROX CCA PSV: 107 CM/SEC
BH CV XLRA MEAS LEFT PROX ECA EDV: 16 CM/SEC
BH CV XLRA MEAS LEFT PROX ECA PSV: 81 CM/SEC
BH CV XLRA MEAS LEFT PROX ICA EDV: 28 CM/SEC
BH CV XLRA MEAS LEFT PROX ICA PSV: 80 CM/SEC
BH CV XLRA MEAS LEFT PROX SCLA EDV: 0 CM/SEC
BH CV XLRA MEAS LEFT PROX SCLA PSV: 106 CM/SEC
BH CV XLRA MEAS LEFT VERTEBRAL A EDV: 11 CM/SEC
BH CV XLRA MEAS LEFT VERTEBRAL A PSV: 38 CM/SEC
BH CV XLRA MEAS RIGHT DIST CCA EDV: 26 CM/SEC
BH CV XLRA MEAS RIGHT DIST CCA PSV: 99 CM/SEC
BH CV XLRA MEAS RIGHT DIST ICA EDV: 33 CM/SEC
BH CV XLRA MEAS RIGHT DIST ICA PSV: 70 CM/SEC
BH CV XLRA MEAS RIGHT ICA/CCA RATIO: 0.7
BH CV XLRA MEAS RIGHT MID CCA EDV: 26 CM/SEC
BH CV XLRA MEAS RIGHT MID CCA PSV: 106 CM/SEC
BH CV XLRA MEAS RIGHT MID ICA EDV: 33 CM/SEC
BH CV XLRA MEAS RIGHT MID ICA PSV: 77 CM/SEC
BH CV XLRA MEAS RIGHT PROX CCA EDV: 18 CM/SEC
BH CV XLRA MEAS RIGHT PROX CCA PSV: 108 CM/SEC
BH CV XLRA MEAS RIGHT PROX ECA EDV: 11 CM/SEC
BH CV XLRA MEAS RIGHT PROX ECA PSV: 99 CM/SEC
BH CV XLRA MEAS RIGHT PROX ICA EDV: 29 CM/SEC
BH CV XLRA MEAS RIGHT PROX ICA PSV: 68 CM/SEC
BH CV XLRA MEAS RIGHT PROX SCLA EDV: 0.7 CM/SEC
BH CV XLRA MEAS RIGHT PROX SCLA PSV: 128 CM/SEC
BH CV XLRA MEAS RIGHT VERTEBRAL A EDV: 16 CM/SEC
BH CV XLRA MEAS RIGHT VERTEBRAL A PSV: 39 CM/SEC
LEFT ARM BP: NORMAL MMHG
LEFT ATRIUM VOLUME INDEX: 23.6 ML/M^2
LV EF 2D ECHO EST: 55 %
MAXIMAL PREDICTED HEART RATE: 185 BPM
RIGHT ARM BP: NORMAL MMHG
STRESS TARGET HR: 157 BPM

## 2018-10-31 PROCEDURE — 93880 EXTRACRANIAL BILAT STUDY: CPT

## 2018-10-31 PROCEDURE — 93306 TTE W/DOPPLER COMPLETE: CPT | Performed by: INTERNAL MEDICINE

## 2018-10-31 PROCEDURE — 93880 EXTRACRANIAL BILAT STUDY: CPT | Performed by: INTERNAL MEDICINE

## 2018-10-31 PROCEDURE — 93306 TTE W/DOPPLER COMPLETE: CPT

## 2018-12-13 ENCOUNTER — TELEPHONE (OUTPATIENT)
Dept: INTERNAL MEDICINE | Facility: CLINIC | Age: 33
End: 2018-12-13

## 2018-12-13 NOTE — TELEPHONE ENCOUNTER
Can we call him in some joint movement pill. Dr. NOGUERA Gave him samples [facility]. Wants to go ortho. Both knees.

## 2019-02-05 ENCOUNTER — OFFICE VISIT (OUTPATIENT)
Dept: INTERNAL MEDICINE | Facility: CLINIC | Age: 34
End: 2019-02-05

## 2019-02-05 VITALS
WEIGHT: 240 LBS | HEART RATE: 68 BPM | BODY MASS INDEX: 36.37 KG/M2 | SYSTOLIC BLOOD PRESSURE: 120 MMHG | HEIGHT: 68 IN | DIASTOLIC BLOOD PRESSURE: 74 MMHG

## 2019-02-05 DIAGNOSIS — F32.A DEPRESSION, UNSPECIFIED DEPRESSION TYPE: ICD-10-CM

## 2019-02-05 DIAGNOSIS — G89.29 CHRONIC PAIN OF RIGHT KNEE: ICD-10-CM

## 2019-02-05 DIAGNOSIS — K21.9 GASTROESOPHAGEAL REFLUX DISEASE WITHOUT ESOPHAGITIS: Primary | ICD-10-CM

## 2019-02-05 DIAGNOSIS — R60.0 BILATERAL EDEMA OF LOWER EXTREMITY: ICD-10-CM

## 2019-02-05 DIAGNOSIS — F20.3 UNDIFFERENTIATED SCHIZOPHRENIA (HCC): ICD-10-CM

## 2019-02-05 DIAGNOSIS — F41.9 ANXIETY: ICD-10-CM

## 2019-02-05 DIAGNOSIS — F31.0 BIPOLAR AFFECTIVE DISORDER, CURRENT EPISODE HYPOMANIC (HCC): ICD-10-CM

## 2019-02-05 DIAGNOSIS — M25.561 CHRONIC PAIN OF RIGHT KNEE: ICD-10-CM

## 2019-02-05 PROBLEM — R20.9 SENSORY DISTURBANCE: Status: RESOLVED | Noted: 2018-08-06 | Resolved: 2019-02-05

## 2019-02-05 PROBLEM — L91.8 SKIN TAG: Status: RESOLVED | Noted: 2018-08-06 | Resolved: 2019-02-05

## 2019-02-05 PROBLEM — R55 SYNCOPE AND COLLAPSE: Status: RESOLVED | Noted: 2018-10-12 | Resolved: 2019-02-05

## 2019-02-05 PROCEDURE — 99214 OFFICE O/P EST MOD 30 MIN: CPT | Performed by: INTERNAL MEDICINE

## 2019-02-05 RX ORDER — OMEPRAZOLE 40 MG/1
40 CAPSULE, DELAYED RELEASE ORAL DAILY
Qty: 30 CAPSULE | Refills: 2 | Status: SHIPPED | OUTPATIENT
Start: 2019-02-05

## 2019-02-05 RX ORDER — TRIAMTERENE AND HYDROCHLOROTHIAZIDE 37.5; 25 MG/1; MG/1
1 TABLET ORAL DAILY PRN
Qty: 30 TABLET | Refills: 11 | Status: SHIPPED | OUTPATIENT
Start: 2019-02-05

## 2019-02-05 NOTE — PROGRESS NOTES
Patient is a 33 y.o. male who is here for L leg pain.  Chief Complaint   Patient presents with   • Pain         HPI:    Here for f/u.  Has regained some weight.  Knees are more painful.  Occasional dizziness.  No further syncope.  GERD is controlled.  Occasional nausea, thinks related to Abilify shot.  No fever or chills.     History:    Patient Active Problem List   Diagnosis   • Anxiety   • Bipolar affective disorder (CMS/[facility])   • Depression   • Gastroesophageal reflux disease without esophagitis   • Knee pain   • Schizophrenia (CMS/[facility])   • Bilateral edema of lower extremity       Past Medical History:   Diagnosis Date   • Orchitis    • Plantar fasciitis        Past Surgical History:   Procedure Laterality Date   • LEG SURGERY  05/03/2013    Right Knee       Current Outpatient Medications on File Prior to Visit   Medication Sig   • ABILIFY MAINTENA 400 MG Suspension Reconstituted ER IM injection ER    • benztropine (COGENTIN) 1 MG tablet Take 1 tablet by mouth 2 (two) times a day.   • buPROPion XL (WELLBUTRIN XL) 150 MG 24 hr tablet Take 150 mg by mouth daily.   • diclofenac (CATAFLAM) 50 MG tablet TAKE 1 TABLET EVERY 8 HOURS AS NEEDED FOR PAIN   • hydrOXYzine (VISTARIL) 25 MG capsule 3 (Three) Times a Day As Needed.   • ondansetron (ZOFRAN) 4 MG tablet Take 1 tablet by mouth every 6 (six) hours as needed.   • [DISCONTINUED] triamterene-hydrochlorothiazide (MAXZIDE-25) 37.5-25 MG per tablet Take 1 tablet by mouth Daily As Needed (edema).   • fluphenazine decanoate (PROLIXIN) 25 MG/ML injection    • [DISCONTINUED] ARIPiprazole (ABILIFY) 15 MG tablet Daily.   • [DISCONTINUED] gabapentin (NEURONTIN) 400 MG capsule 2 (Two) Times a Day.   • [DISCONTINUED] omeprazole (PriLOSEC) 40 MG capsule Take 1 capsule by mouth daily.     No current facility-administered medications on file prior to visit.        Family History   Problem Relation Age of Onset   • Diabetes Mother    • Hypertension Mother    • Atrial fibrillation  Father    • Colon cancer Sister        Social History     Socioeconomic History   • Marital status: Single     Spouse name: Not on file   • Number of children: Not on file   • Years of education: Not on file   • Highest education level: Not on file   Social Needs   • Financial resource strain: Not on file   • Food insecurity - worry: Not on file   • Food insecurity - inability: Not on file   • Transportation needs - medical: Not on file   • Transportation needs - non-medical: Not on file   Occupational History   • Not on file   Tobacco Use   • Smoking status: Never Smoker   Substance and Sexual Activity   • Alcohol use: Not on file   • Drug use: Not on file   • Sexual activity: Not on file   Other Topics Concern   • Not on file   Social History Narrative   • Not on file         Review of Systems   Constitutional: Positive for unexpected weight change. Negative for chills, diaphoresis, fatigue and fever.   HENT: Negative for congestion, ear pain, hearing loss, nosebleeds, postnasal drip, sinus pressure and sore throat.    Eyes: Negative for pain, discharge and itching.   Respiratory: Negative for cough, chest tightness, shortness of breath and wheezing.    Cardiovascular: Negative for chest pain, palpitations and leg swelling.   Gastrointestinal: Negative for abdominal distention, abdominal pain, blood in stool, constipation, diarrhea, nausea and vomiting.        10/15 colonoscopy normal   Endocrine: Negative for polydipsia and polyuria.   Genitourinary: Negative for difficulty urinating, dysuria, frequency and hematuria.   Musculoskeletal: Positive for arthralgias, back pain and joint swelling. Negative for gait problem and myalgias.        Knee pain on R>L   Skin: Negative for rash and wound.   Neurological: Negative for dizziness, weakness and headaches.   Psychiatric/Behavioral: Positive for behavioral problems and decreased concentration. Negative for dysphoric mood. The patient is nervous/anxious.        BP  "120/74 (BP Location: Left arm, Patient Position: Sitting)   Pulse 68   Ht 172.7 cm (67.99\")   Wt 109 kg (240 lb)   BMI 36.50 kg/m²       Physical Exam   Constitutional: He is oriented to person, place, and time. He appears well-developed and well-nourished.   HENT:   Head: Normocephalic and atraumatic.   Right Ear: External ear normal.   Left Ear: External ear normal.   Mouth/Throat: Oropharynx is clear and moist.   Eyes: Conjunctivae and EOM are normal.   Neck: Normal range of motion. Neck supple.   Cardiovascular: Normal rate, regular rhythm and normal heart sounds.   Pulmonary/Chest: Effort normal and breath sounds normal.   Abdominal: Soft. Bowel sounds are normal.   Musculoskeletal: Normal range of motion. He exhibits edema (left knee).   R>L Knee crepitus, moderate   Lymphadenopathy:     He has no cervical adenopathy.   Neurological: He is alert and oriented to person, place, and time.   Skin: Skin is warm and dry.   Psychiatric: He has a normal mood and affect. His behavior is normal.       Procedure:      Discussion/Summary:    knee pain-diclofenac prn, advised not to use any otc meds, f/u ortho  gerd-retry PPI  high risk meds-labs noted  bipolor/schizophrenia-advised compliance with meds   constipation-colace bid, increase fiber  nausea-better  Edema-restart Maxzide  Abnormal lft-workup noted, RUQ noted, improved with wt loss      Labs noted and dw patient  Call [phone]        Current Outpatient Medications:   •  ABILIFY MAINTENA 400 MG Suspension Reconstituted ER IM injection ER, , Disp: , Rfl:   •  benztropine (COGENTIN) 1 MG tablet, Take 1 tablet by mouth 2 (two) times a day., Disp: , Rfl:   •  buPROPion XL (WELLBUTRIN XL) 150 MG 24 hr tablet, Take 150 mg by mouth daily., Disp: , Rfl:   •  diclofenac (CATAFLAM) 50 MG tablet, TAKE 1 TABLET EVERY 8 HOURS AS NEEDED FOR PAIN, Disp: , Rfl: 0  •  hydrOXYzine (VISTARIL) 25 MG capsule, 3 (Three) Times a Day As Needed., Disp: , Rfl:   •  ondansetron " (ZOFRAN) 4 MG tablet, Take 1 tablet by mouth every 6 (six) hours as needed., Disp: , Rfl:   •  triamterene-hydrochlorothiazide (MAXZIDE-25) 37.5-25 MG per tablet, Take 1 tablet by mouth Daily As Needed (edema)., Disp: 30 tablet, Rfl: 11  •  fluphenazine decanoate (PROLIXIN) 25 MG/ML injection, , Disp: , Rfl:   •  omeprazole (priLOSEC) 40 MG capsule, Take 1 capsule by mouth Daily., Disp: 30 capsule, Rfl: 2        Coretta was seen today for pain.    Diagnoses and all orders for this visit:    Gastroesophageal reflux disease without esophagitis  -     omeprazole (priLOSEC) 40 MG capsule; Take 1 capsule by mouth Daily.    Chronic pain of right knee    Undifferentiated schizophrenia (CMS/HCC)    Depression, unspecified depression type    Bipolar affective disorder, current episode hypomanic (CMS/HCC)    Bilateral edema of lower extremity  -     triamterene-hydrochlorothiazide (MAXZIDE-25) 37.5-25 MG per tablet; Take 1 tablet by mouth Daily As Needed (edema).    Anxiety

## 2019-05-30 ENCOUNTER — OFFICE VISIT (OUTPATIENT)
Dept: INTERNAL MEDICINE | Facility: CLINIC | Age: 34
End: 2019-05-30

## 2019-05-30 VITALS
SYSTOLIC BLOOD PRESSURE: 100 MMHG | HEART RATE: 64 BPM | DIASTOLIC BLOOD PRESSURE: 70 MMHG | HEIGHT: 68 IN | BODY MASS INDEX: 36.83 KG/M2 | WEIGHT: 243 LBS

## 2019-05-30 DIAGNOSIS — M25.561 CHRONIC PAIN OF RIGHT KNEE: ICD-10-CM

## 2019-05-30 DIAGNOSIS — F32.A DEPRESSION, UNSPECIFIED DEPRESSION TYPE: ICD-10-CM

## 2019-05-30 DIAGNOSIS — F41.9 ANXIETY: ICD-10-CM

## 2019-05-30 DIAGNOSIS — R60.0 BILATERAL EDEMA OF LOWER EXTREMITY: ICD-10-CM

## 2019-05-30 DIAGNOSIS — G89.29 CHRONIC PAIN OF RIGHT KNEE: ICD-10-CM

## 2019-05-30 DIAGNOSIS — F31.0 BIPOLAR AFFECTIVE DISORDER, CURRENT EPISODE HYPOMANIC (HCC): ICD-10-CM

## 2019-05-30 DIAGNOSIS — K21.9 GASTROESOPHAGEAL REFLUX DISEASE WITHOUT ESOPHAGITIS: Primary | ICD-10-CM

## 2019-05-30 DIAGNOSIS — F20.3 UNDIFFERENTIATED SCHIZOPHRENIA (HCC): ICD-10-CM

## 2019-05-30 PROCEDURE — 99214 OFFICE O/P EST MOD 30 MIN: CPT | Performed by: INTERNAL MEDICINE

## 2019-05-30 NOTE — PROGRESS NOTES
Patient is a 34 y.o. male who is here for a follow up of chronic conditions.  Chief Complaint   Patient presents with   • Anxiety         HPI:    Here for f/u.  Feels good overall.  Energy level is good.  No dizziness or lightheadedness.  No HAs.  GERD is good.  No further syncope.  Sleeping well.  No falls.  Trying to work out more.      History:    Patient Active Problem List   Diagnosis   • Anxiety   • Bipolar affective disorder (CMS/HCC)   • Depression   • Gastroesophageal reflux disease without esophagitis   • Knee pain   • Schizophrenia (CMS/HCC)   • Bilateral edema of lower extremity       Past Medical History:   Diagnosis Date   • Orchitis    • Plantar fasciitis        Past Surgical History:   Procedure Laterality Date   • LEG SURGERY  05/03/2013    Right Knee       Current Outpatient Medications on File Prior to Visit   Medication Sig   • ABILIFY MAINTENA 400 MG Suspension Reconstituted ER IM injection ER    • benztropine (COGENTIN) 1 MG tablet Take 1 tablet by mouth 2 (two) times a day.   • buPROPion XL (WELLBUTRIN XL) 150 MG 24 hr tablet Take 150 mg by mouth daily.   • diclofenac (CATAFLAM) 50 MG tablet TAKE 1 TABLET EVERY 8 HOURS AS NEEDED FOR PAIN   • fluphenazine decanoate (PROLIXIN) 25 MG/ML injection    • hydrOXYzine (VISTARIL) 25 MG capsule 3 (Three) Times a Day As Needed.   • omeprazole (priLOSEC) 40 MG capsule Take 1 capsule by mouth Daily.   • ondansetron (ZOFRAN) 4 MG tablet Take 1 tablet by mouth every 6 (six) hours as needed.   • triamterene-hydrochlorothiazide (MAXZIDE-25) 37.5-25 MG per tablet Take 1 tablet by mouth Daily As Needed (edema).     No current facility-administered medications on file prior to visit.        Family History   Problem Relation Age of Onset   • Diabetes Mother    • Hypertension Mother    • Atrial fibrillation Father    • Colon cancer Sister        Social History     Socioeconomic History   • Marital status: Single     Spouse name: Not on file   • Number of children:  "Not on file   • Years of education: Not on file   • Highest education level: Not on file   Tobacco Use   • Smoking status: Never Smoker         Review of Systems   Constitutional: Negative for chills, diaphoresis, fatigue and fever.   HENT: Negative for congestion, ear pain, hearing loss, nosebleeds, postnasal drip, sinus pressure and sore throat.    Eyes: Negative for pain, discharge and itching.   Respiratory: Negative for cough, chest tightness, shortness of breath and wheezing.    Cardiovascular: Negative for chest pain, palpitations and leg swelling.   Gastrointestinal: Negative for abdominal distention, abdominal pain, blood in stool, constipation, diarrhea, nausea and vomiting.        10/15 colonoscopy normal   Endocrine: Negative for polydipsia and polyuria.   Genitourinary: Negative for difficulty urinating, dysuria, frequency and hematuria.   Musculoskeletal: Positive for arthralgias, back pain and joint swelling. Negative for gait problem and myalgias.        Knee pain on R>L   Skin: Negative for rash and wound.   Neurological: Negative for dizziness, weakness and headaches.   Psychiatric/Behavioral: Positive for behavioral problems and decreased concentration. Negative for dysphoric mood. The patient is nervous/anxious.        /70   Pulse 64   Ht 172 cm (67.72\")   Wt 110 kg (243 lb)   BMI 37.26 kg/m²       Physical Exam   Constitutional: He is oriented to person, place, and time. He appears well-developed and well-nourished.   HENT:   Head: Normocephalic and atraumatic.   Right Ear: External ear normal.   Left Ear: External ear normal.   Mouth/Throat: Oropharynx is clear and moist.   Eyes: Conjunctivae and EOM are normal.   Neck: Normal range of motion. Neck supple.   Cardiovascular: Normal rate, regular rhythm and normal heart sounds.   Pulmonary/Chest: Effort normal and breath sounds normal.   Abdominal: Soft. Bowel sounds are normal.   Musculoskeletal: Normal range of motion. He exhibits " edema (left knee).   R>L Knee crepitus, moderate   Lymphadenopathy:     He has no cervical adenopathy.   Neurological: He is alert and oriented to person, place, and time.   Skin: Skin is warm and dry.   Psychiatric: He has a normal mood and affect. His behavior is normal.       Procedure:      Discussion/Summary:    knee pain-diclofenac prn, advised not to use any otc meds, f/u ortho  gerd-retry PPI  high risk meds-labs on rtc  bipolor/schizophrenia-advised compliance with meds   constipation-colace bid, increase fiber  nausea-better  Edema-restart Maxzide prn  Abnormal lft-workup noted, RUQ noted, improved with wt loss      Labs noted and dw patient  Call [phone]        Current Outpatient Medications:   •  ABILIFY MAINTENA 400 MG Suspension Reconstituted ER IM injection ER, , Disp: , Rfl:   •  benztropine (COGENTIN) 1 MG tablet, Take 1 tablet by mouth 2 (two) times a day., Disp: , Rfl:   •  buPROPion XL (WELLBUTRIN XL) 150 MG 24 hr tablet, Take 150 mg by mouth daily., Disp: , Rfl:   •  diclofenac (CATAFLAM) 50 MG tablet, TAKE 1 TABLET EVERY 8 HOURS AS NEEDED FOR PAIN, Disp: , Rfl: 0  •  fluphenazine decanoate (PROLIXIN) 25 MG/ML injection, , Disp: , Rfl:   •  hydrOXYzine (VISTARIL) 25 MG capsule, 3 (Three) Times a Day As Needed., Disp: , Rfl:   •  omeprazole (priLOSEC) 40 MG capsule, Take 1 capsule by mouth Daily., Disp: 30 capsule, Rfl: 2  •  ondansetron (ZOFRAN) 4 MG tablet, Take 1 tablet by mouth every 6 (six) hours as needed., Disp: , Rfl:   •  triamterene-hydrochlorothiazide (MAXZIDE-25) 37.5-25 MG per tablet, Take 1 tablet by mouth Daily As Needed (edema)., Disp: 30 tablet, Rfl: 11        Coretta was seen today for anxiety.    Diagnoses and all orders for this visit:    Gastroesophageal reflux disease without esophagitis    Chronic pain of right knee    Undifferentiated schizophrenia (CMS/HCC)    Depression, unspecified depression type    Bipolar affective disorder, current episode hypomanic  (CMS/HCC)    Bilateral edema of lower extremity    Anxiety

## 2019-06-21 ENCOUNTER — TRANSCRIBE ORDERS (OUTPATIENT)
Dept: ADMINISTRATIVE | Facility: HOSPITAL | Age: 34
End: 2019-06-21

## 2019-06-21 DIAGNOSIS — H47.20 OPTIC NERVE ATROPHY: Primary | ICD-10-CM

## 2019-06-21 DIAGNOSIS — H46.8 OTHER OPTIC NEURITIS: ICD-10-CM

## 2019-06-24 ENCOUNTER — HOSPITAL ENCOUNTER (OUTPATIENT)
Dept: MRI IMAGING | Facility: HOSPITAL | Age: 34
Discharge: HOME | End: 2019-06-24
Admitting: OPHTHALMOLOGY

## 2019-06-24 ENCOUNTER — HOSPITAL ENCOUNTER (OUTPATIENT)
Dept: MRI IMAGING | Facility: HOSPITAL | Age: 34
Discharge: HOME | End: 2019-06-24

## 2019-06-24 DIAGNOSIS — H47.20 OPTIC NERVE ATROPHY: ICD-10-CM

## 2019-06-24 DIAGNOSIS — H46.8 OTHER OPTIC NEURITIS: ICD-10-CM

## 2019-06-24 PROCEDURE — 70553 MRI BRAIN STEM W/O & W/DYE: CPT

## 2019-06-24 PROCEDURE — 70543 MRI ORBT/FAC/NCK W/O &W/DYE: CPT

## 2019-06-24 PROCEDURE — 0 GADOBENATE DIMEGLUMINE 529 MG/ML SOLUTION: Performed by: OPHTHALMOLOGY

## 2019-06-24 PROCEDURE — A9577 INJ MULTIHANCE: HCPCS | Performed by: OPHTHALMOLOGY

## 2019-06-24 RX ADMIN — GADOBENATE DIMEGLUMINE 20 ML: 529 INJECTION, SOLUTION INTRAVENOUS at 15:35

## 2019-09-12 ENCOUNTER — OFFICE VISIT (OUTPATIENT)
Dept: INTERNAL MEDICINE | Facility: CLINIC | Age: 34
End: 2019-09-12

## 2019-09-12 VITALS
BODY MASS INDEX: 37.59 KG/M2 | HEART RATE: 68 BPM | WEIGHT: 248 LBS | SYSTOLIC BLOOD PRESSURE: 120 MMHG | HEIGHT: 68 IN | DIASTOLIC BLOOD PRESSURE: 76 MMHG

## 2019-09-12 DIAGNOSIS — G89.29 CHRONIC PAIN OF RIGHT KNEE: ICD-10-CM

## 2019-09-12 DIAGNOSIS — R60.0 BILATERAL EDEMA OF LOWER EXTREMITY: ICD-10-CM

## 2019-09-12 DIAGNOSIS — K21.9 GASTROESOPHAGEAL REFLUX DISEASE WITHOUT ESOPHAGITIS: Primary | ICD-10-CM

## 2019-09-12 DIAGNOSIS — F41.9 ANXIETY: ICD-10-CM

## 2019-09-12 DIAGNOSIS — M25.561 CHRONIC PAIN OF RIGHT KNEE: ICD-10-CM

## 2019-09-12 DIAGNOSIS — F31.0 BIPOLAR AFFECTIVE DISORDER, CURRENT EPISODE HYPOMANIC (HCC): ICD-10-CM

## 2019-09-12 DIAGNOSIS — R73.09 ABNORMAL GLUCOSE: ICD-10-CM

## 2019-09-12 DIAGNOSIS — R94.6 ABNORMAL THYROID FUNCTION TEST: ICD-10-CM

## 2019-09-12 DIAGNOSIS — F20.3 UNDIFFERENTIATED SCHIZOPHRENIA (HCC): ICD-10-CM

## 2019-09-12 DIAGNOSIS — F32.A DEPRESSION, UNSPECIFIED DEPRESSION TYPE: ICD-10-CM

## 2019-09-12 LAB
ALBUMIN SERPL-MCNC: 4.6 G/DL (ref 3.5–5.2)
ALBUMIN/GLOB SERPL: 1.6 G/DL
ALP SERPL-CCNC: 80 U/L (ref 39–117)
ALT SERPL W P-5'-P-CCNC: 27 U/L (ref 1–41)
ANION GAP SERPL CALCULATED.3IONS-SCNC: 11.1 MMOL/L (ref 5–15)
AST SERPL-CCNC: 22 U/L (ref 1–40)
BILIRUB SERPL-MCNC: 0.5 MG/DL (ref 0.2–1.2)
BUN BLD-MCNC: 15 MG/DL (ref 6–20)
BUN/CREAT SERPL: 13.9 (ref 7–25)
CALCIUM SPEC-SCNC: 9.9 MG/DL (ref 8.6–10.5)
CHLORIDE SERPL-SCNC: 98 MMOL/L (ref 98–107)
CO2 SERPL-SCNC: 28.9 MMOL/L (ref 22–29)
CREAT BLD-MCNC: 1.08 MG/DL (ref 0.76–1.27)
DEPRECATED RDW RBC AUTO: 43 FL (ref 37–54)
ERYTHROCYTE [DISTWIDTH] IN BLOOD BY AUTOMATED COUNT: 13 % (ref 12.3–15.4)
GFR SERPL CREATININE-BSD FRML MDRD: 77 ML/MIN/1.73
GLOBULIN UR ELPH-MCNC: 2.9 GM/DL
GLUCOSE BLD-MCNC: 97 MG/DL (ref 65–99)
HBA1C MFR BLD: 5.5 % (ref 4.8–5.6)
HCT VFR BLD AUTO: 45.2 % (ref 37.5–51)
HGB BLD-MCNC: 14.4 G/DL (ref 13–17.7)
MCH RBC QN AUTO: 28.7 PG (ref 26.6–33)
MCHC RBC AUTO-ENTMCNC: 31.9 G/DL (ref 31.5–35.7)
MCV RBC AUTO: 90 FL (ref 79–97)
PLATELET # BLD AUTO: 286 10*3/MM3 (ref 140–450)
PMV BLD AUTO: 10.1 FL (ref 6–12)
POTASSIUM BLD-SCNC: 4.1 MMOL/L (ref 3.5–5.2)
PROT SERPL-MCNC: 7.5 G/DL (ref 6–8.5)
RBC # BLD AUTO: 5.02 10*6/MM3 (ref 4.14–5.8)
SODIUM BLD-SCNC: 138 MMOL/L (ref 136–145)
TSH SERPL DL<=0.05 MIU/L-ACNC: 4.38 UIU/ML (ref 0.27–4.2)
WBC NRBC COR # BLD: 6.59 10*3/MM3 (ref 3.4–10.8)

## 2019-09-12 PROCEDURE — 99214 OFFICE O/P EST MOD 30 MIN: CPT | Performed by: INTERNAL MEDICINE

## 2019-09-12 PROCEDURE — 83036 HEMOGLOBIN GLYCOSYLATED A1C: CPT | Performed by: INTERNAL MEDICINE

## 2019-09-12 PROCEDURE — 84480 ASSAY TRIIODOTHYRONINE (T3): CPT | Performed by: INTERNAL MEDICINE

## 2019-09-12 PROCEDURE — 84443 ASSAY THYROID STIM HORMONE: CPT | Performed by: INTERNAL MEDICINE

## 2019-09-12 PROCEDURE — 80053 COMPREHEN METABOLIC PANEL: CPT | Performed by: INTERNAL MEDICINE

## 2019-09-12 PROCEDURE — 85027 COMPLETE CBC AUTOMATED: CPT | Performed by: INTERNAL MEDICINE

## 2019-09-12 PROCEDURE — 84439 ASSAY OF FREE THYROXINE: CPT | Performed by: INTERNAL MEDICINE

## 2019-09-12 RX ORDER — BUPROPION HYDROCHLORIDE 300 MG/1
300 TABLET ORAL EVERY MORNING
Qty: 30 TABLET | Refills: 11 | Status: SHIPPED | OUTPATIENT
Start: 2019-09-12 | End: 2019-10-09

## 2019-09-12 NOTE — PROGRESS NOTES
Patient is a 34 y.o. male who is here for a follow up of anxiety.  Chief Complaint   Patient presents with   • Anxiety         HPI:    Here for mgmt of anxiety and GERD.  Onset years.  Difficulty loosing weight.  Very active.  Not following diet.  Sleeping well.  GERD is controlled.   Has gained 5 pounds since last seen.  No nausea or emesis.  Cannot control his appetite.  Depression and anxiety is controlled.  No constipation.    History:     Patient Active Problem List   Diagnosis   • Anxiety   • Bipolar affective disorder (CMS/HCC)   • Depression   • Gastroesophageal reflux disease without esophagitis   • Knee pain   • Schizophrenia (CMS/HCC)   • Bilateral edema of lower extremity       Past Medical History:   Diagnosis Date   • Orchitis    • Plantar fasciitis        Past Surgical History:   Procedure Laterality Date   • LEG SURGERY  05/03/2013    Right Knee       Current Outpatient Medications on File Prior to Visit   Medication Sig   • ABILIFY MAINTENA 400 MG Suspension Reconstituted ER IM injection ER    • benztropine (COGENTIN) 1 MG tablet Take 1 tablet by mouth 2 (two) times a day.   • diclofenac (CATAFLAM) 50 MG tablet TAKE 1 TABLET EVERY 8 HOURS AS NEEDED FOR PAIN   • fluphenazine decanoate (PROLIXIN) 25 MG/ML injection    • hydrOXYzine (VISTARIL) 25 MG capsule 3 (Three) Times a Day As Needed.   • omeprazole (priLOSEC) 40 MG capsule Take 1 capsule by mouth Daily.   • ondansetron (ZOFRAN) 4 MG tablet Take 1 tablet by mouth every 6 (six) hours as needed.   • triamterene-hydrochlorothiazide (MAXZIDE-25) 37.5-25 MG per tablet Take 1 tablet by mouth Daily As Needed (edema).     No current facility-administered medications on file prior to visit.        Family History   Problem Relation Age of Onset   • Diabetes Mother    • Hypertension Mother    • Atrial fibrillation Father    • Colon cancer Sister        Social History     Socioeconomic History   • Marital status: Single     Spouse name: Not on file   • Number  "of children: Not on file   • Years of education: Not on file   • Highest education level: Not on file   Tobacco Use   • Smoking status: Never Smoker         Review of Systems   Constitutional: Positive for unexpected weight change. Negative for chills, diaphoresis, fatigue and fever.   HENT: Negative for congestion, ear pain, hearing loss, nosebleeds, postnasal drip, sinus pressure and sore throat.    Eyes: Negative for pain, discharge and itching.   Respiratory: Negative for cough, chest tightness, shortness of breath and wheezing.    Cardiovascular: Negative for chest pain, palpitations and leg swelling.   Gastrointestinal: Negative for abdominal distention, abdominal pain, blood in stool, constipation, diarrhea, nausea and vomiting.        10/15 colonoscopy normal   Endocrine: Negative for polydipsia and polyuria.   Genitourinary: Negative for difficulty urinating, dysuria, frequency and hematuria.   Musculoskeletal: Positive for arthralgias and joint swelling. Negative for gait problem and myalgias.        Knee pain on R>L   Skin: Negative for rash and wound.   Neurological: Negative for dizziness, weakness and headaches.   Psychiatric/Behavioral: Positive for behavioral problems and decreased concentration. Negative for dysphoric mood.       /76   Pulse 68   Ht 172.7 cm (67.99\")   Wt 112 kg (248 lb)   BMI 37.72 kg/m²       Physical Exam   Constitutional: He is oriented to person, place, and time. He appears well-developed and well-nourished.   HENT:   Head: Normocephalic and atraumatic.   Right Ear: External ear normal.   Left Ear: External ear normal.   Mouth/Throat: Oropharynx is clear and moist.   Eyes: Conjunctivae and EOM are normal.   Neck: Normal range of motion. Neck supple.   Cardiovascular: Normal rate, regular rhythm and normal heart sounds.   Pulmonary/Chest: Effort normal and breath sounds normal.   Abdominal: Soft. Bowel sounds are normal.   Musculoskeletal: Normal range of motion.   R>L " Knee crepitus, moderate   Lymphadenopathy:     He has no cervical adenopathy.   Neurological: He is alert and oriented to person, place, and time.   Skin: Skin is warm and dry.   Psychiatric: He has a normal mood and affect. His behavior is normal.       Procedure:      Discussion/Summary:    knee pain-diclofenac prn, advised not to use any otc meds, improved  gerd-stable on  PPI  bipolor/schizophrenia-advised compliance with meds , with wt gain and difficulty with appetite control, will increase wellbutrin  constipation-colace bid, increase fiber, improved  Edema-cont maxzide prn  Abnormal lft-work up noted, recheck today and improved      9/12 Labs noted and dw patient  Call [phone]    Current Outpatient Medications:   •  ABILIFY MAINTENA 400 MG Suspension Reconstituted ER IM injection ER, , Disp: , Rfl:   •  benztropine (COGENTIN) 1 MG tablet, Take 1 tablet by mouth 2 (two) times a day., Disp: , Rfl:   •  buPROPion XL (WELLBUTRIN XL) 300 MG 24 hr tablet, Take 1 tablet by mouth Every Morning., Disp: 30 tablet, Rfl: 11  •  diclofenac (CATAFLAM) 50 MG tablet, TAKE 1 TABLET EVERY 8 HOURS AS NEEDED FOR PAIN, Disp: , Rfl: 0  •  fluphenazine decanoate (PROLIXIN) 25 MG/ML injection, , Disp: , Rfl:   •  hydrOXYzine (VISTARIL) 25 MG capsule, 3 (Three) Times a Day As Needed., Disp: , Rfl:   •  omeprazole (priLOSEC) 40 MG capsule, Take 1 capsule by mouth Daily., Disp: 30 capsule, Rfl: 2  •  ondansetron (ZOFRAN) 4 MG tablet, Take 1 tablet by mouth every 6 (six) hours as needed., Disp: , Rfl:   •  triamterene-hydrochlorothiazide (MAXZIDE-25) 37.5-25 MG per tablet, Take 1 tablet by mouth Daily As Needed (edema)., Disp: 30 tablet, Rfl: 11        Geremias was seen today for anxiety.    Diagnoses and all orders for this visit:    Gastroesophageal reflux disease without esophagitis    Chronic pain of right knee    Undifferentiated schizophrenia (CMS/HCC)    Depression, unspecified depression type    Bipolar affective  disorder, current episode hypomanic (CMS/HCC)  -     buPROPion XL (WELLBUTRIN XL) 300 MG 24 hr tablet; Take 1 tablet by mouth Every Morning.  -     CBC (No Diff)    Bilateral edema of lower extremity  -     Comprehensive Metabolic Panel    Anxiety  -     TSH    Abnormal glucose  -     Hemoglobin A1c    Abnormal thyroid function test  -     T3  -     T4, free

## 2019-09-13 LAB
T3 SERPL-MCNC: 93.2 NG/DL (ref 80–200)
T4 FREE SERPL-MCNC: 1.1 NG/DL (ref 0.93–1.7)

## 2019-10-09 ENCOUNTER — TELEPHONE (OUTPATIENT)
Dept: INTERNAL MEDICINE | Facility: CLINIC | Age: 34
End: 2019-10-09

## 2020-02-20 ENCOUNTER — OFFICE VISIT (OUTPATIENT)
Dept: INTERNAL MEDICINE | Facility: CLINIC | Age: 35
End: 2020-02-20

## 2020-02-20 VITALS
WEIGHT: 252 LBS | HEIGHT: 68 IN | SYSTOLIC BLOOD PRESSURE: 120 MMHG | DIASTOLIC BLOOD PRESSURE: 78 MMHG | HEART RATE: 72 BPM | BODY MASS INDEX: 38.19 KG/M2

## 2020-02-20 DIAGNOSIS — F41.9 ANXIETY: ICD-10-CM

## 2020-02-20 DIAGNOSIS — K21.9 GASTROESOPHAGEAL REFLUX DISEASE WITHOUT ESOPHAGITIS: Primary | ICD-10-CM

## 2020-02-20 DIAGNOSIS — F31.0 BIPOLAR AFFECTIVE DISORDER, CURRENT EPISODE HYPOMANIC (HCC): ICD-10-CM

## 2020-02-20 DIAGNOSIS — F32.A DEPRESSION, UNSPECIFIED DEPRESSION TYPE: ICD-10-CM

## 2020-02-20 DIAGNOSIS — F20.3 UNDIFFERENTIATED SCHIZOPHRENIA (HCC): ICD-10-CM

## 2020-02-20 LAB
ALBUMIN SERPL-MCNC: 4.4 G/DL (ref 3.5–5.2)
ALBUMIN/GLOB SERPL: 1.6 G/DL
ALP SERPL-CCNC: 78 U/L (ref 39–117)
ALT SERPL W P-5'-P-CCNC: 43 U/L (ref 1–41)
ANION GAP SERPL CALCULATED.3IONS-SCNC: 10.4 MMOL/L (ref 5–15)
AST SERPL-CCNC: 26 U/L (ref 1–40)
BILIRUB SERPL-MCNC: 0.6 MG/DL (ref 0.2–1.2)
BUN BLD-MCNC: 16 MG/DL (ref 6–20)
BUN/CREAT SERPL: 17.2 (ref 7–25)
CALCIUM SPEC-SCNC: 9.8 MG/DL (ref 8.6–10.5)
CHLORIDE SERPL-SCNC: 102 MMOL/L (ref 98–107)
CO2 SERPL-SCNC: 28.6 MMOL/L (ref 22–29)
CREAT BLD-MCNC: 0.93 MG/DL (ref 0.76–1.27)
GFR SERPL CREATININE-BSD FRML MDRD: 92 ML/MIN/1.73
GLOBULIN UR ELPH-MCNC: 2.7 GM/DL
GLUCOSE BLD-MCNC: 81 MG/DL (ref 65–99)
POTASSIUM BLD-SCNC: 4.3 MMOL/L (ref 3.5–5.2)
PROT SERPL-MCNC: 7.1 G/DL (ref 6–8.5)
SODIUM BLD-SCNC: 141 MMOL/L (ref 136–145)
TSH SERPL DL<=0.05 MIU/L-ACNC: 2.61 UIU/ML (ref 0.27–4.2)

## 2020-02-20 PROCEDURE — 80053 COMPREHEN METABOLIC PANEL: CPT | Performed by: INTERNAL MEDICINE

## 2020-02-20 PROCEDURE — 99213 OFFICE O/P EST LOW 20 MIN: CPT | Performed by: INTERNAL MEDICINE

## 2020-02-20 PROCEDURE — 84443 ASSAY THYROID STIM HORMONE: CPT | Performed by: INTERNAL MEDICINE

## 2020-02-20 NOTE — PROGRESS NOTES
Patient is a 34 y.o. male who is here for a follow up of depression.  Chief Complaint   Patient presents with   • Depression         HPI:    Here of mgmt of GERD and arthritis.  Compliant with meds.  Continues to be followed by Dr Sims.  GERD is controlled.  No nausea or emesis.  His anxiety is worsened over the recent illness of his Dad.  No dizziness or lightheadedness.  No HAs.  Knee arthritis is tolerable and not needing to take any prn.  Walking daily without any issues.     History:     Patient Active Problem List   Diagnosis   • Anxiety   • Bipolar affective disorder (CMS/HCC)   • Depression   • Gastroesophageal reflux disease without esophagitis   • Knee pain   • Schizophrenia (CMS/HCC)   • Bilateral edema of lower extremity       Past Medical History:   Diagnosis Date   • Orchitis    • Plantar fasciitis        Past Surgical History:   Procedure Laterality Date   • LEG SURGERY  05/03/2013    Right Knee       Current Outpatient Medications on File Prior to Visit   Medication Sig   • ABILIFY MAINTENA 400 MG Suspension Reconstituted ER IM injection ER    • benztropine (COGENTIN) 1 MG tablet Take 1 tablet by mouth 2 (two) times a day.   • diclofenac (CATAFLAM) 50 MG tablet TAKE 1 TABLET EVERY 8 HOURS AS NEEDED FOR PAIN   • fluphenazine decanoate (PROLIXIN) 25 MG/ML injection    • hydrOXYzine (VISTARIL) 25 MG capsule 3 (Three) Times a Day As Needed.   • omeprazole (priLOSEC) 40 MG capsule Take 1 capsule by mouth Daily.   • ondansetron (ZOFRAN) 4 MG tablet Take 1 tablet by mouth Every 4 (Four) Hours As Needed for Nausea.   • triamterene-hydrochlorothiazide (MAXZIDE-25) 37.5-25 MG per tablet Take 1 tablet by mouth Daily As Needed (edema).   • [DISCONTINUED] brompheniramine-pseudoephedrine-DM 30-2-10 MG/5ML syrup Take 5 mL by mouth 4 (Four) Times a Day As Needed for Cough.     No current facility-administered medications on file prior to visit.        Family History   Problem Relation Age of Onset   • Diabetes  "Mother    • Hypertension Mother    • Atrial fibrillation Father    • Colon cancer Sister        Social History     Socioeconomic History   • Marital status: Single     Spouse name: Not on file   • Number of children: Not on file   • Years of education: Not on file   • Highest education level: Not on file   Tobacco Use   • Smoking status: Never Smoker         Review of Systems   Constitutional: Positive for unexpected weight change (gain). Negative for chills, diaphoresis, fatigue and fever.   HENT: Negative for congestion, ear pain, hearing loss, nosebleeds, postnasal drip, sinus pressure and sore throat.    Eyes: Negative for pain, discharge and itching.   Respiratory: Negative for cough, chest tightness, shortness of breath and wheezing.    Cardiovascular: Negative for chest pain, palpitations and leg swelling.   Gastrointestinal: Negative for abdominal distention, abdominal pain, blood in stool, constipation, diarrhea, nausea and vomiting.        10/15 colonoscopy normal   Endocrine: Negative for polydipsia and polyuria.   Genitourinary: Negative for difficulty urinating, dysuria, frequency and hematuria.   Musculoskeletal: Positive for arthralgias. Negative for gait problem and myalgias.        Knee pain on R>L   Skin: Negative for rash and wound.   Neurological: Negative for dizziness, weakness and headaches.   Psychiatric/Behavioral: Positive for behavioral problems and decreased concentration. Negative for dysphoric mood.       /78   Pulse 72   Ht 172 cm (67.72\")   Wt 114 kg (252 lb)   BMI 38.64 kg/m²       Physical Exam   Constitutional: He is oriented to person, place, and time. He appears well-developed and well-nourished.   HENT:   Head: Normocephalic and atraumatic.   Right Ear: External ear normal.   Left Ear: External ear normal.   Mouth/Throat: Oropharynx is clear and moist.   Eyes: Conjunctivae and EOM are normal.   Neck: Normal range of motion. Neck supple.   Cardiovascular: Normal rate, " regular rhythm and normal heart sounds.   Pulmonary/Chest: Effort normal and breath sounds normal.   Abdominal: Soft. Bowel sounds are normal.   Musculoskeletal: Normal range of motion.   R>L Knee crepitus, moderate   Lymphadenopathy:     He has no cervical adenopathy.   Neurological: He is alert and oriented to person, place, and time.   Skin: Skin is warm and dry.   Psychiatric: He has a normal mood and affect. His behavior is normal.       Procedure:      Discussion/Summary:    knee pain-diclofenac prn, improved  gerd-stable on  PPI  bipolor/schizophrenia-advised compliance with meds , stable  constipation-colace bid, increase fiber, improved  Edema-cont maxzide prn  Abnormal lft-work up noted, recheck today  Abnormal tft-recheck, stable      2/20 Labs noted and dw patient  Call [phone]    Current Outpatient Medications:   •  ABILIFY MAINTENA 400 MG Suspension Reconstituted ER IM injection ER, , Disp: , Rfl:   •  benztropine (COGENTIN) 1 MG tablet, Take 1 tablet by mouth 2 (two) times a day., Disp: , Rfl:   •  diclofenac (CATAFLAM) 50 MG tablet, TAKE 1 TABLET EVERY 8 HOURS AS NEEDED FOR PAIN, Disp: , Rfl: 0  •  fluphenazine decanoate (PROLIXIN) 25 MG/ML injection, , Disp: , Rfl:   •  hydrOXYzine (VISTARIL) 25 MG capsule, 3 (Three) Times a Day As Needed., Disp: , Rfl:   •  omeprazole (priLOSEC) 40 MG capsule, Take 1 capsule by mouth Daily., Disp: 30 capsule, Rfl: 2  •  ondansetron (ZOFRAN) 4 MG tablet, Take 1 tablet by mouth Every 4 (Four) Hours As Needed for Nausea., Disp: 30 tablet, Rfl: 0  •  triamterene-hydrochlorothiazide (MAXZIDE-25) 37.5-25 MG per tablet, Take 1 tablet by mouth Daily As Needed (edema)., Disp: 30 tablet, Rfl: 11        Geremias was seen today for depression.    Diagnoses and all orders for this visit:    Gastroesophageal reflux disease without esophagitis  -     Comprehensive Metabolic Panel  -     TSH    Undifferentiated schizophrenia (CMS/HCC)    Depression, unspecified depression  type  -     Comprehensive Metabolic Panel  -     TSH    Bipolar affective disorder, current episode hypomanic (CMS/HCC)    Anxiety  -     Comprehensive Metabolic Panel  -     TSH

## 2020-06-24 ENCOUNTER — LAB (OUTPATIENT)
Dept: LAB | Facility: HOSPITAL | Age: 35
End: 2020-06-24

## 2020-06-24 ENCOUNTER — OFFICE VISIT (OUTPATIENT)
Dept: INTERNAL MEDICINE | Facility: CLINIC | Age: 35
End: 2020-06-24

## 2020-06-24 VITALS
HEIGHT: 68 IN | HEART RATE: 72 BPM | TEMPERATURE: 97.1 F | WEIGHT: 255 LBS | BODY MASS INDEX: 38.65 KG/M2 | SYSTOLIC BLOOD PRESSURE: 100 MMHG | DIASTOLIC BLOOD PRESSURE: 70 MMHG

## 2020-06-24 DIAGNOSIS — R73.09 ABNORMAL GLUCOSE: ICD-10-CM

## 2020-06-24 DIAGNOSIS — R60.0 BILATERAL EDEMA OF LOWER EXTREMITY: ICD-10-CM

## 2020-06-24 DIAGNOSIS — G89.29 CHRONIC PAIN OF RIGHT KNEE: ICD-10-CM

## 2020-06-24 DIAGNOSIS — F31.0 BIPOLAR AFFECTIVE DISORDER, CURRENT EPISODE HYPOMANIC (HCC): ICD-10-CM

## 2020-06-24 DIAGNOSIS — F41.9 ANXIETY: ICD-10-CM

## 2020-06-24 DIAGNOSIS — K21.9 GASTROESOPHAGEAL REFLUX DISEASE WITHOUT ESOPHAGITIS: Primary | ICD-10-CM

## 2020-06-24 DIAGNOSIS — F32.A DEPRESSION, UNSPECIFIED DEPRESSION TYPE: ICD-10-CM

## 2020-06-24 DIAGNOSIS — F20.3 UNDIFFERENTIATED SCHIZOPHRENIA (HCC): ICD-10-CM

## 2020-06-24 DIAGNOSIS — M25.561 CHRONIC PAIN OF RIGHT KNEE: ICD-10-CM

## 2020-06-24 LAB
DEPRECATED RDW RBC AUTO: 39.4 FL (ref 37–54)
ERYTHROCYTE [DISTWIDTH] IN BLOOD BY AUTOMATED COUNT: 12.8 % (ref 12.3–15.4)
HCT VFR BLD AUTO: 44.6 % (ref 37.5–51)
HGB BLD-MCNC: 15 G/DL (ref 13–17.7)
MCH RBC QN AUTO: 28.6 PG (ref 26.6–33)
MCHC RBC AUTO-ENTMCNC: 33.6 G/DL (ref 31.5–35.7)
MCV RBC AUTO: 85 FL (ref 79–97)
PLATELET # BLD AUTO: 262 10*3/MM3 (ref 140–450)
PMV BLD AUTO: 10.2 FL (ref 6–12)
RBC # BLD AUTO: 5.25 10*6/MM3 (ref 4.14–5.8)
WBC NRBC COR # BLD: 6.1 10*3/MM3 (ref 3.4–10.8)

## 2020-06-24 PROCEDURE — 80053 COMPREHEN METABOLIC PANEL: CPT | Performed by: INTERNAL MEDICINE

## 2020-06-24 PROCEDURE — 84443 ASSAY THYROID STIM HORMONE: CPT | Performed by: INTERNAL MEDICINE

## 2020-06-24 PROCEDURE — 82607 VITAMIN B-12: CPT | Performed by: INTERNAL MEDICINE

## 2020-06-24 PROCEDURE — 85027 COMPLETE CBC AUTOMATED: CPT | Performed by: INTERNAL MEDICINE

## 2020-06-24 PROCEDURE — 99214 OFFICE O/P EST MOD 30 MIN: CPT | Performed by: INTERNAL MEDICINE

## 2020-06-24 PROCEDURE — 83036 HEMOGLOBIN GLYCOSYLATED A1C: CPT | Performed by: INTERNAL MEDICINE

## 2020-06-24 NOTE — PROGRESS NOTES
Patient is a 35 y.o. male who is here for a follow up of depression.  Chief Complaint   Patient presents with   • Depression         HPI:    Here for mgmt of DJD and GERD and constipation.  Onset years.  Constipation is controlled.  No BPBPR.  GERD is controlled.  Sleeping well.  No dizziness or lightheadedness.  No HA or abdominal pains.  No nausea or emesis.  Sleeping well.     History:     Patient Active Problem List   Diagnosis   • Anxiety   • Bipolar affective disorder (CMS/[facility])   • Depression   • Gastroesophageal reflux disease without esophagitis   • Knee pain   • Schizophrenia (CMS/HCC)   • Bilateral edema of lower extremity       Past Medical History:   Diagnosis Date   • Orchitis    • Plantar fasciitis        Past Surgical History:   Procedure Laterality Date   • LEG SURGERY  05/03/2013    Right Knee       Current Outpatient Medications on File Prior to Visit   Medication Sig   • ABILIFY MAINTENA 400 MG Suspension Reconstituted ER IM injection ER    • benztropine (COGENTIN) 1 MG tablet Take 1 tablet by mouth 2 (two) times a day.   • diclofenac (CATAFLAM) 50 MG tablet TAKE 1 TABLET EVERY 8 HOURS AS NEEDED FOR PAIN   • fluphenazine decanoate (PROLIXIN) 25 MG/ML injection    • hydrOXYzine (VISTARIL) 25 MG capsule 3 (Three) Times a Day As Needed.   • omeprazole (priLOSEC) 40 MG capsule Take 1 capsule by mouth Daily.   • ondansetron (ZOFRAN) 4 MG tablet Take 1 tablet by mouth Every 4 (Four) Hours As Needed for Nausea.   • triamterene-hydrochlorothiazide (MAXZIDE-25) 37.5-25 MG per tablet Take 1 tablet by mouth Daily As Needed (edema).     No current facility-administered medications on file prior to visit.        Family History   Problem Relation Age of Onset   • Diabetes Mother    • Hypertension Mother    • Atrial fibrillation Father    • Colon cancer Sister        Social History     Socioeconomic History   • Marital status: Single     Spouse name: Not on file   • Number of children: Not on file   • Years of  "education: Not on file   • Highest education level: Not on file   Tobacco Use   • Smoking status: Never Smoker         Review of Systems   Constitutional: Negative for chills, diaphoresis, fatigue and fever.   HENT: Negative for congestion, ear pain, hearing loss, nosebleeds, postnasal drip, sinus pressure and sore throat.    Eyes: Negative for pain, discharge and itching.   Respiratory: Negative for cough, chest tightness, shortness of breath and wheezing.    Cardiovascular: Negative for chest pain, palpitations and leg swelling.   Gastrointestinal: Negative for abdominal distention, abdominal pain, blood in stool, constipation, diarrhea, nausea and vomiting.        10/15 colonoscopy normal   Endocrine: Negative for polydipsia and polyuria.   Genitourinary: Negative for difficulty urinating, dysuria, frequency and hematuria.   Musculoskeletal: Positive for arthralgias. Negative for gait problem and myalgias.        Knee pain on R>L   Skin: Negative for rash and wound.   Neurological: Negative for dizziness, weakness and headaches.   Psychiatric/Behavioral: Positive for behavioral problems and decreased concentration. Negative for dysphoric mood.       /70   Pulse 72   Temp 97.1 °F (36.2 °C) (Temporal)   Ht 172.7 cm (67.99\")   Wt 116 kg (255 lb)   BMI 38.78 kg/m²       Physical Exam   Constitutional: He is oriented to person, place, and time. He appears well-developed and well-nourished.   HENT:   Head: Normocephalic and atraumatic.   Right Ear: External ear normal.   Left Ear: External ear normal.   Mouth/Throat: Oropharynx is clear and moist.   Eyes: Conjunctivae and EOM are normal.   Neck: Normal range of motion. Neck supple.   Cardiovascular: Normal rate, regular rhythm and normal heart sounds.   Pulmonary/Chest: Effort normal and breath sounds normal.   Abdominal: Soft. Bowel sounds are normal.   Musculoskeletal: Normal range of motion.   R>L Knee crepitus, moderate   Lymphadenopathy:     He has no " cervical adenopathy.   Neurological: He is alert and oriented to person, place, and time.   Skin: Skin is warm and dry.   Psychiatric: He has a normal mood and affect. His behavior is normal.       Procedure:      Discussion/Summary:    knee pain-diclofenac prn, stable  gerd-controlled on  PPI  bipolor/schizophrenia-advised compliance with meds , controlled  constipation-colace bid, increase fiber, not an issue  Edema-cont maxzide prn  Abnormal lft-work up noted, recheck today improved  Abnormal tft-recheck improved      6/24 Labs noted and dw patient, counseled on low carb/ncs   Call [phone]       Current Outpatient Medications:   •  ABILIFY MAINTENA 400 MG Suspension Reconstituted ER IM injection ER, , Disp: , Rfl:   •  benztropine (COGENTIN) 1 MG tablet, Take 1 tablet by mouth 2 (two) times a day., Disp: , Rfl:   •  diclofenac (CATAFLAM) 50 MG tablet, TAKE 1 TABLET EVERY 8 HOURS AS NEEDED FOR PAIN, Disp: , Rfl: 0  •  fluphenazine decanoate (PROLIXIN) 25 MG/ML injection, , Disp: , Rfl:   •  hydrOXYzine (VISTARIL) 25 MG capsule, 3 (Three) Times a Day As Needed., Disp: , Rfl:   •  omeprazole (priLOSEC) 40 MG capsule, Take 1 capsule by mouth Daily., Disp: 30 capsule, Rfl: 2  •  ondansetron (ZOFRAN) 4 MG tablet, Take 1 tablet by mouth Every 4 (Four) Hours As Needed for Nausea., Disp: 30 tablet, Rfl: 0  •  triamterene-hydrochlorothiazide (MAXZIDE-25) 37.5-25 MG per tablet, Take 1 tablet by mouth Daily As Needed (edema)., Disp: 30 tablet, Rfl: 11        Geremias was seen today for depression.    Diagnoses and all orders for this visit:    Gastroesophageal reflux disease without esophagitis  -     CBC (No Diff)  -     Comprehensive Metabolic Panel    Chronic pain of right knee    Undifferentiated schizophrenia (CMS/HCC)    Depression, unspecified depression type  -     Vitamin B12  -     TSH    Bipolar affective disorder, current episode hypomanic (CMS/HCC)  -     Vitamin B12  -     TSH    Bilateral edema of lower  extremity    Anxiety  -     Vitamin B12  -     TSH    Abnormal glucose  -     Hemoglobin A1c

## 2020-06-25 LAB
ALBUMIN SERPL-MCNC: 4.6 G/DL (ref 3.5–5.2)
ALBUMIN/GLOB SERPL: 1.5 G/DL
ALP SERPL-CCNC: 73 U/L (ref 39–117)
ALT SERPL W P-5'-P-CCNC: 42 U/L (ref 1–41)
ANION GAP SERPL CALCULATED.3IONS-SCNC: 14.3 MMOL/L (ref 5–15)
AST SERPL-CCNC: 29 U/L (ref 1–40)
BILIRUB SERPL-MCNC: 0.8 MG/DL (ref 0.2–1.2)
BUN BLD-MCNC: 14 MG/DL (ref 6–20)
BUN/CREAT SERPL: 13.6 (ref 7–25)
CALCIUM SPEC-SCNC: 9.8 MG/DL (ref 8.6–10.5)
CHLORIDE SERPL-SCNC: 98 MMOL/L (ref 98–107)
CO2 SERPL-SCNC: 27.7 MMOL/L (ref 22–29)
CREAT BLD-MCNC: 1.03 MG/DL (ref 0.76–1.27)
GFR SERPL CREATININE-BSD FRML MDRD: 81 ML/MIN/1.73
GLOBULIN UR ELPH-MCNC: 3 GM/DL
GLUCOSE BLD-MCNC: 111 MG/DL (ref 65–99)
HBA1C MFR BLD: 5.6 % (ref 4.8–5.6)
POTASSIUM BLD-SCNC: 3.9 MMOL/L (ref 3.5–5.2)
PROT SERPL-MCNC: 7.6 G/DL (ref 6–8.5)
SODIUM BLD-SCNC: 140 MMOL/L (ref 136–145)
TSH SERPL DL<=0.05 MIU/L-ACNC: 4.2 UIU/ML (ref 0.27–4.2)
VIT B12 BLD-MCNC: 333 PG/ML (ref 211–946)

## 2020-07-15 ENCOUNTER — LAB (OUTPATIENT)
Dept: LAB | Facility: HOSPITAL | Age: 35
End: 2020-07-15

## 2020-07-15 ENCOUNTER — OFFICE VISIT (OUTPATIENT)
Dept: INTERNAL MEDICINE | Facility: CLINIC | Age: 35
End: 2020-07-15

## 2020-07-15 VITALS
DIASTOLIC BLOOD PRESSURE: 84 MMHG | HEART RATE: 80 BPM | RESPIRATION RATE: 16 BRPM | WEIGHT: 245 LBS | OXYGEN SATURATION: 98 % | SYSTOLIC BLOOD PRESSURE: 130 MMHG | HEIGHT: 67 IN | BODY MASS INDEX: 38.45 KG/M2 | TEMPERATURE: 98.2 F

## 2020-07-15 DIAGNOSIS — F41.9 ANXIETY: ICD-10-CM

## 2020-07-15 DIAGNOSIS — R20.2 NUMBNESS AND TINGLING OF RIGHT UPPER AND LOWER EXTREMITY: ICD-10-CM

## 2020-07-15 DIAGNOSIS — R94.31 ABNORMAL ECG: Primary | ICD-10-CM

## 2020-07-15 DIAGNOSIS — R20.0 NUMBNESS AND TINGLING OF RIGHT UPPER AND LOWER EXTREMITY: ICD-10-CM

## 2020-07-15 LAB
ALBUMIN SERPL-MCNC: 4.6 G/DL (ref 3.5–5.2)
ALBUMIN/GLOB SERPL: 1.5 G/DL
ALP SERPL-CCNC: 75 U/L (ref 39–117)
ALT SERPL W P-5'-P-CCNC: 37 U/L (ref 1–41)
ANION GAP SERPL CALCULATED.3IONS-SCNC: 12 MMOL/L (ref 5–15)
AST SERPL-CCNC: 23 U/L (ref 1–40)
BASOPHILS # BLD AUTO: 0.02 10*3/MM3 (ref 0–0.2)
BASOPHILS NFR BLD AUTO: 0.3 % (ref 0–1.5)
BILIRUB SERPL-MCNC: 0.7 MG/DL (ref 0–1.2)
BUN SERPL-MCNC: 13 MG/DL (ref 6–20)
BUN/CREAT SERPL: 14.8 (ref 7–25)
CALCIUM SPEC-SCNC: 9.8 MG/DL (ref 8.6–10.5)
CHLORIDE SERPL-SCNC: 101 MMOL/L (ref 98–107)
CO2 SERPL-SCNC: 25 MMOL/L (ref 22–29)
CREAT SERPL-MCNC: 0.88 MG/DL (ref 0.76–1.27)
DEPRECATED RDW RBC AUTO: 39.7 FL (ref 37–54)
EOSINOPHIL # BLD AUTO: 0.26 10*3/MM3 (ref 0–0.4)
EOSINOPHIL NFR BLD AUTO: 3.3 % (ref 0.3–6.2)
ERYTHROCYTE [DISTWIDTH] IN BLOOD BY AUTOMATED COUNT: 12.9 % (ref 12.3–15.4)
GFR SERPL CREATININE-BSD FRML MDRD: 97 ML/MIN/1.73
GLOBULIN UR ELPH-MCNC: 3.1 GM/DL
GLUCOSE SERPL-MCNC: 84 MG/DL (ref 65–99)
HCT VFR BLD AUTO: 40.2 % (ref 37.5–51)
HGB BLD-MCNC: 13.7 G/DL (ref 13–17.7)
IMM GRANULOCYTES # BLD AUTO: 0.02 10*3/MM3 (ref 0–0.05)
IMM GRANULOCYTES NFR BLD AUTO: 0.3 % (ref 0–0.5)
LYMPHOCYTES # BLD AUTO: 1.97 10*3/MM3 (ref 0.7–3.1)
LYMPHOCYTES NFR BLD AUTO: 24.8 % (ref 19.6–45.3)
MCH RBC QN AUTO: 28.7 PG (ref 26.6–33)
MCHC RBC AUTO-ENTMCNC: 34.1 G/DL (ref 31.5–35.7)
MCV RBC AUTO: 84.3 FL (ref 79–97)
MONOCYTES # BLD AUTO: 0.59 10*3/MM3 (ref 0.1–0.9)
MONOCYTES NFR BLD AUTO: 7.4 % (ref 5–12)
NEUTROPHILS NFR BLD AUTO: 5.07 10*3/MM3 (ref 1.7–7)
NEUTROPHILS NFR BLD AUTO: 63.9 % (ref 42.7–76)
NRBC BLD AUTO-RTO: 0 /100 WBC (ref 0–0.2)
PLATELET # BLD AUTO: 269 10*3/MM3 (ref 140–450)
PMV BLD AUTO: 10.1 FL (ref 6–12)
POTASSIUM SERPL-SCNC: 4 MMOL/L (ref 3.5–5.2)
PROT SERPL-MCNC: 7.7 G/DL (ref 6–8.5)
RBC # BLD AUTO: 4.77 10*6/MM3 (ref 4.14–5.8)
SODIUM SERPL-SCNC: 138 MMOL/L (ref 136–145)
VIT B12 BLD-MCNC: 328 PG/ML (ref 211–946)
WBC # BLD AUTO: 7.93 10*3/MM3 (ref 3.4–10.8)

## 2020-07-15 PROCEDURE — 82607 VITAMIN B-12: CPT | Performed by: NURSE PRACTITIONER

## 2020-07-15 PROCEDURE — 85025 COMPLETE CBC W/AUTO DIFF WBC: CPT | Performed by: NURSE PRACTITIONER

## 2020-07-15 PROCEDURE — 80053 COMPREHEN METABOLIC PANEL: CPT | Performed by: NURSE PRACTITIONER

## 2020-07-15 PROCEDURE — 99214 OFFICE O/P EST MOD 30 MIN: CPT | Performed by: NURSE PRACTITIONER

## 2020-07-15 PROCEDURE — 93000 ELECTROCARDIOGRAM COMPLETE: CPT | Performed by: NURSE PRACTITIONER

## 2020-07-15 RX ORDER — ASPIRIN 81 MG/1
81 TABLET ORAL DAILY
Qty: 30 TABLET | Refills: 11 | Status: SHIPPED | OUTPATIENT
Start: 2020-07-15

## 2020-07-15 NOTE — PROGRESS NOTES
Subjective   Geremias Serrano is a 35 y.o. male here today for anxiety.    Chief Complaint   Patient presents with   • Anxiety     numbness since taking the injectable Abilify.   Geremias is here today with a 3-day history of intermittent numbness and weakness of his right side.  This is happened when he is become physically active he will experience his symptoms and they resolve when he sits down and rests.  No headaches.  Has had some lightheadedness.  He is not having any shortness of breath or chest pain.  He does have severe anxiety and has been taking hydroxyzine which is not helping.  He is followed by psychiatrist for his schizophrenia and receives injectable Abilify.  He does have a family history of cardiovascular disease.    Review of Systems   Constitutional: Negative for diaphoresis, fatigue, unexpected weight gain and unexpected weight loss.   HENT: Negative for nosebleeds and trouble swallowing.    Eyes: Negative for blurred vision and visual disturbance.   Respiratory: Negative for chest tightness and shortness of breath.    Cardiovascular: Negative for chest pain, palpitations and leg swelling.   Gastrointestinal: Negative for blood in stool, nausea and vomiting.   Musculoskeletal: Positive for arthralgias.   Skin: Negative for rash and skin lesions.   Neurological: Negative for dizziness, syncope, facial asymmetry, light-headedness, headache, memory problem and confusion.   Psychiatric/Behavioral: Positive for agitation and behavioral problems. Negative for suicidal ideas. The patient is nervous/anxious.        Past Medical History:   Diagnosis Date   • Orchitis    • Plantar fasciitis      Family History   Problem Relation Age of Onset   • Diabetes Mother    • Hypertension Mother    • Atrial fibrillation Father    • Colon cancer Sister      Past Surgical History:   Procedure Laterality Date   • LEG SURGERY  05/03/2013    Right Knee     Social History     Socioeconomic History   • Marital status:  "Single     Spouse name: Not on file   • Number of children: Not on file   • Years of education: Not on file   • Highest education level: Not on file   Tobacco Use   • Smoking status: Never Smoker         Current Outpatient Medications:   •  ABILIFY MAINTENA 400 MG Suspension Reconstituted ER IM injection ER, , Disp: , Rfl:   •  benztropine (COGENTIN) 1 MG tablet, Take 1 tablet by mouth 2 (two) times a day., Disp: , Rfl:   •  diclofenac (CATAFLAM) 50 MG tablet, TAKE 1 TABLET EVERY 8 HOURS AS NEEDED FOR PAIN, Disp: , Rfl: 0  •  fluphenazine decanoate (PROLIXIN) 25 MG/ML injection, , Disp: , Rfl:   •  hydrOXYzine (VISTARIL) 25 MG capsule, 3 (Three) Times a Day As Needed., Disp: , Rfl:   •  omeprazole (priLOSEC) 40 MG capsule, Take 1 capsule by mouth Daily., Disp: 30 capsule, Rfl: 2  •  ondansetron (ZOFRAN) 4 MG tablet, Take 1 tablet by mouth Every 4 (Four) Hours As Needed for Nausea., Disp: 30 tablet, Rfl: 0  •  triamterene-hydrochlorothiazide (MAXZIDE-25) 37.5-25 MG per tablet, Take 1 tablet by mouth Daily As Needed (edema)., Disp: 30 tablet, Rfl: 11  •  aspirin (aspirin) 81 MG EC tablet, Take 1 tablet by mouth Daily., Disp: 30 tablet, Rfl: 11    Objective   Vitals:    07/15/20 1132   BP: 130/84   Pulse: 80   Resp: 16   Temp: 98.2 °F (36.8 °C)   TempSrc: Temporal   SpO2: 98%   Weight: 111 kg (245 lb)   Height: 170.2 cm (67\")     Body mass index is 38.37 kg/m².  Physical Exam   Constitutional: He is oriented to person, place, and time. He appears well-developed and well-nourished. No distress.   Eyes: Pupils are equal, round, and reactive to light.   Neck: Neck supple. No thyromegaly present.   Cardiovascular: Normal rate and regular rhythm.   Pulmonary/Chest: Effort normal and breath sounds normal.   Musculoskeletal:        Arms:  Neurological: He is alert and oriented to person, place, and time. He displays no atrophy and no tremor. Coordination normal. GCS eye subscore is 4. GCS verbal subscore is 5. GCS motor " subscore is 6.    strength greater than left hand, strength equal bilateral lower extremities   Skin: Skin is warm and dry. Capillary refill takes 2 to 3 seconds. He is not diaphoretic.   Psychiatric: His behavior is normal. Judgment and thought content normal. His mood appears anxious.   Nursing note and vitals reviewed.      ECG 12 Lead  Date/Time: 7/15/2020 1:06 PM  Performed by: Yuliya Sawyer APRN  Authorized by: Yuliya Sawyer APRN   Comparison: not compared with previous ECG   Previous ECG: no previous ECG available  Rhythm: sinus bradycardia  Rhythm comments: SA  Q waves: I, aVL, V5 and V6      Clinical impression: abnormal EKG  Comments: Possible MI- age undetermined            Assessment/Plan   Problem List Items Addressed This Visit        Other    Anxiety    Relevant Orders    CBC & Differential    Comprehensive Metabolic Panel    Vitamin B12    CBC Auto Differential      Other Visit Diagnoses     Abnormal ECG    -  Primary    Relevant Orders    Ambulatory Referral to Cardiology    ECG 12 Lead    Numbness and tingling of right upper and lower extremity        Relevant Orders    Vitamin B12        Geremias was seen today for anxiety.    Diagnoses and all orders for this visit:    Abnormal ECG  -     Ambulatory Referral to Cardiology  -     ECG 12 Lead  EKG is abnormal so would like to refer to cardiology but reassured I do not believe this is the cause of his symptoms.  This would be a very atypical presentation of angina and has only been going on for approximately 3 days.  I believe the numbness in his hands may be a manifestation of panic attacks.  Discussed deep breathing exercises and keeping psychiatrist appointments.  Will check labs today.  Will start baby aspirin today and consult cardiology  Anxiety  -     CBC & Differential  -     Comprehensive Metabolic Panel  -     Vitamin B12  -     CBC Auto Differential    Numbness and tingling of right upper and lower extremity  -     Vitamin  B12    Other orders  -     aspirin (aspirin) 81 MG EC tablet; Take 1 tablet by mouth Daily.             The patient was counseled regarding diagnostic results, impressions, prognosis, instructions for management, risk factor reductions, education, and importance of treatment compliance.  The patient verbalized understanding of and agreement with the plan of care.    Advised patient to call with any further questions and any new or worsening symptoms.     Return if symptoms worsen or fail to improve.      Yuliya Sawyer, APRBELGICA    Please note that portions of this note were completed with a voice recognition program. Efforts were made to edit the dictations, but occasionally words are mistranscribed.

## 2020-09-30 ENCOUNTER — CONSULT (OUTPATIENT)
Dept: CARDIOLOGY | Facility: CLINIC | Age: 35
End: 2020-09-30

## 2020-09-30 VITALS
BODY MASS INDEX: 38.45 KG/M2 | HEART RATE: 66 BPM | HEIGHT: 67 IN | DIASTOLIC BLOOD PRESSURE: 64 MMHG | WEIGHT: 245 LBS | SYSTOLIC BLOOD PRESSURE: 98 MMHG | OXYGEN SATURATION: 98 %

## 2020-09-30 DIAGNOSIS — R94.31 ABNORMAL EKG: Primary | ICD-10-CM

## 2020-09-30 DIAGNOSIS — E66.01 SEVERE OBESITY (BMI 35.0-39.9) WITH COMORBIDITY (HCC): ICD-10-CM

## 2020-09-30 PROCEDURE — 99203 OFFICE O/P NEW LOW 30 MIN: CPT | Performed by: INTERNAL MEDICINE

## 2020-09-30 PROCEDURE — 93000 ELECTROCARDIOGRAM COMPLETE: CPT | Performed by: NURSE PRACTITIONER

## 2020-09-30 NOTE — PROGRESS NOTES
[facility] Cardiology    Subjective:     Encounter Date:09/30/2020    Patient ID: Geremias Serrano is a 35 y.o. male.  Referring provider: BOO Pike     Reason for consultation:   Chief Complaint   Patient presents with   • Abnormal ECG        PROBLEM LIST:  1. Abnormal EKG  a. Echo 10/12/18: EF 56-60%, trace MR, trace TR, trace GA.   2. History of syncope  a. Carotid duplex 10/31/18: no sig atherosclerotic plaque, no sig stenosis to bilateral internal carotids.   3. Anxiety  4. Schizophrenia.   a. Followed by psychiatrist.    5. Family history of CAD.     No Known Allergies      Current Outpatient Medications:   •  ABILIFY MAINTENA 400 MG Suspension Reconstituted ER IM injection ER, , Disp: , Rfl:   •  aspirin (aspirin) 81 MG EC tablet, Take 1 tablet by mouth Daily., Disp: 30 tablet, Rfl: 11  •  benztropine (COGENTIN) 1 MG tablet, Take 1 tablet by mouth 2 (two) times a day., Disp: , Rfl:   •  diclofenac (CATAFLAM) 50 MG tablet, TAKE 1 TABLET EVERY 8 HOURS AS NEEDED FOR PAIN, Disp: , Rfl: 0  •  fluphenazine decanoate (PROLIXIN) 25 MG/ML injection, , Disp: , Rfl:   •  hydrOXYzine (VISTARIL) 25 MG capsule, 3 (Three) Times a Day As Needed., Disp: , Rfl:   •  omeprazole (priLOSEC) 40 MG capsule, Take 1 capsule by mouth Daily., Disp: 30 capsule, Rfl: 2  •  ondansetron (ZOFRAN) 4 MG tablet, Take 1 tablet by mouth Every 4 (Four) Hours As Needed for Nausea., Disp: 30 tablet, Rfl: 0  •  triamterene-hydrochlorothiazide (MAXZIDE-25) 37.5-25 MG per tablet, Take 1 tablet by mouth Daily As Needed (edema)., Disp: 30 tablet, Rfl: 11    HPI:      Geremias Serrano is a 35 y.o. male who is seen in consultation today at the request of BOO Pike for abnormal EKG. EKG was ordered for right sided lower extremity numbness. Starts after working out for 3-4 minutes.These symptoms improve with continued exertion. Denies chest pain, SOB, PND, orthopnea. Occasional edema for which he is  prescribed maxzide.     Cardiac Risk Factors:    Social History     Socioeconomic History   • Marital status: Single     Spouse name: Not on file   • Number of children: Not on file   • Years of education: Not on file   • Highest education level: Not on file   Tobacco Use   • Smoking status: Never Smoker   • Smokeless tobacco: Never Used   Substance and Sexual Activity   • Alcohol use: Never     Frequency: Never   • Drug use: Never   • Sexual activity: Defer     Family History   Problem Relation Age of Onset   • Diabetes Mother    • Hypertension Mother    • Atrial fibrillation Father    • Colon cancer Sister        Review of Systems:  The following portions of the patient's history were reviewed and updated as appropriate: allergies, current medications and problem list.    Constitution: Negative for chills, fatigue, fever, and generalized weakness.   Cardiovascular: See HPI  Respiratory: See HPI  HENT: Negative for ear pain, nosebleeds, and tinnitus.  Gastrointestinal: Negative for abdominal pain, constipation, diarrhea, nausea and vomiting.   Genitourinary: No urinary symptoms.   Musculoskeletal: Negative for muscle cramps.  Neurological: Negative for dizziness, headaches, loss of balance, and symptoms of stroke. + right sided numbness  Psychiatric: Negative for depression, anxiety.         Objective:     Vitals:    09/30/20 1052   BP: 98/64   Pulse: 66   SpO2: 98%     GENERAL: This is a well-developed, well-nourished, male who is in no acute distress. Alert and oriented x3. Normal mood and affect.   SKIN: Pink and warm without rash or abnormality noted.   HEENT: Head is normocephalic and atraumatic. Pupils are equal and reactive to light bilaterally. Mucous membranes are pink and moist.   NECK: Supple without lymphadenopathy or thyromegaly. There is no jugular venous distention at 30°.  LUNGS: Clear to auscultation bilaterally without wheezing, rhonchi, or rales noted.   CARDIOVASCULAR: The heart has a regular  rate with a normal S1 and S2. There is no murmur, gallop, rub, or click appreciated. The PMI is nondisplaced. Carotid upstrokes are 2+ and symmetrical without bruits.   ABDOMEN: Soft and nondistended with positive bowel sounds x4. The patient denies tenderness of palpitation.   MUSCULOSKELETAL: There are no obvious bony abnormalities. Normal range of tenderness to palpation.   NEUROLOGICAL: Nonfocal.   PERIPHERAL VASCULAR: Femoral pulses are 2+ and symmetrical without bruits. Posterior tibial and dorsalis pedis pulses are 2+ and symmetrical. There is no peripheral edema.       ECG 12 Lead    Date/Time: 9/30/2020 11:25 AM  Performed by: Etelvina Beltran APRN  Authorized by: Etelvina Beltran APRN   Comparison: compared with previous ECG from 7/29/2020  Similar to previous ECG  Rhythm: sinus rhythm  Q waves: I, aVL, V5 and V6      Clinical impression: abnormal EKG              Assessment:       ICD-10-CM ICD-9-CM   1. Abnormal EKG  R94.31 794.31        Plan:     1. Reviewed outside records.   2. Will proceed with stress echocardiogram for further evaluation of abnormal EKG  3. Symptoms of right sided weakness/numbness do not sound cardiac.   4. FLP day of stress test.   5. Will call with results and if unremarkable he can F/U as needed.      Scribed for Laura Howard MD by BOO Reyes. 9/30/2020  11:26 EDT     I Laura Howard MD personally performed the services described in this documentation as scribed by the above individual in my presence, and it is both accurate and complete.    Laura Howard MD, [facility]

## 2020-10-18 ENCOUNTER — APPOINTMENT (OUTPATIENT)
Dept: PREADMISSION TESTING | Facility: HOSPITAL | Age: 35
End: 2020-10-18

## 2020-10-20 ENCOUNTER — APPOINTMENT (OUTPATIENT)
Dept: CARDIOLOGY | Facility: HOSPITAL | Age: 35
End: 2020-10-20

## 2020-10-26 ENCOUNTER — OFFICE VISIT (OUTPATIENT)
Dept: INTERNAL MEDICINE | Facility: CLINIC | Age: 35
End: 2020-10-26

## 2020-10-26 VITALS
SYSTOLIC BLOOD PRESSURE: 100 MMHG | HEART RATE: 72 BPM | BODY MASS INDEX: 38.3 KG/M2 | HEIGHT: 67 IN | DIASTOLIC BLOOD PRESSURE: 70 MMHG | WEIGHT: 244 LBS | TEMPERATURE: 97.1 F

## 2020-10-26 DIAGNOSIS — G89.29 CHRONIC PAIN OF RIGHT KNEE: ICD-10-CM

## 2020-10-26 DIAGNOSIS — M25.561 CHRONIC PAIN OF RIGHT KNEE: ICD-10-CM

## 2020-10-26 DIAGNOSIS — F20.3 UNDIFFERENTIATED SCHIZOPHRENIA (HCC): ICD-10-CM

## 2020-10-26 DIAGNOSIS — F31.0 BIPOLAR AFFECTIVE DISORDER, CURRENT EPISODE HYPOMANIC (HCC): ICD-10-CM

## 2020-10-26 DIAGNOSIS — K21.9 GASTROESOPHAGEAL REFLUX DISEASE WITHOUT ESOPHAGITIS: Primary | ICD-10-CM

## 2020-10-26 DIAGNOSIS — R60.0 BILATERAL EDEMA OF LOWER EXTREMITY: ICD-10-CM

## 2020-10-26 DIAGNOSIS — F32.A DEPRESSION, UNSPECIFIED DEPRESSION TYPE: ICD-10-CM

## 2020-10-26 PROCEDURE — 99213 OFFICE O/P EST LOW 20 MIN: CPT | Performed by: INTERNAL MEDICINE

## 2020-10-26 NOTE — PROGRESS NOTES
Patient is a 35 y.o. male who is here for a follow up of depression and pain.  Chief Complaint   Patient presents with   • Depression   • Pain         HPI:    Here for mgmt of GERD and knee djd and edema.  No exercising as much.  No dizziness or lightheadedness.  No HAs.  Continues to be followed at [facility].  No abdominal pains.   History:     Patient Active Problem List   Diagnosis   • Anxiety   • Bipolar affective disorder (CMS/HCC)   • Depression   • Gastroesophageal reflux disease without esophagitis   • Knee pain   • Schizophrenia (CMS/HCC)   • Bilateral edema of lower extremity   • Abnormal EKG       Past Medical History:   Diagnosis Date   • Abnormal ECG    • Orchitis    • Plantar fasciitis        Past Surgical History:   Procedure Laterality Date   • LEG SURGERY  05/03/2013    Right Knee       Current Outpatient Medications on File Prior to Visit   Medication Sig   • ABILIFY MAINTENA 400 MG Suspension Reconstituted ER IM injection ER    • aspirin (aspirin) 81 MG EC tablet Take 1 tablet by mouth Daily.   • benztropine (COGENTIN) 1 MG tablet Take 1 tablet by mouth 2 (two) times a day.   • diclofenac (CATAFLAM) 50 MG tablet TAKE 1 TABLET EVERY 8 HOURS AS NEEDED FOR PAIN   • fluphenazine decanoate (PROLIXIN) 25 MG/ML injection    • hydrOXYzine (VISTARIL) 25 MG capsule 3 (Three) Times a Day As Needed.   • omeprazole (priLOSEC) 40 MG capsule Take 1 capsule by mouth Daily.   • ondansetron (ZOFRAN) 4 MG tablet Take 1 tablet by mouth Every 4 (Four) Hours As Needed for Nausea.   • triamterene-hydrochlorothiazide (MAXZIDE-25) 37.5-25 MG per tablet Take 1 tablet by mouth Daily As Needed (edema).     No current facility-administered medications on file prior to visit.        Family History   Problem Relation Age of Onset   • Diabetes Mother    • Hypertension Mother    • Atrial fibrillation Father    • Colon cancer Sister        Social History     Socioeconomic History   • Marital status: Single     Spouse name: Not on file  "  • Number of children: Not on file   • Years of education: Not on file   • Highest education level: Not on file   Tobacco Use   • Smoking status: Never Smoker   • Smokeless tobacco: Never Used   Substance and Sexual Activity   • Alcohol use: Never     Frequency: Never   • Drug use: Never   • Sexual activity: Defer         Review of Systems   Constitutional: Negative for chills, diaphoresis, fatigue and fever.   HENT: Negative for congestion, ear pain, hearing loss, nosebleeds, postnasal drip, sinus pressure and sore throat.    Eyes: Negative for pain, discharge and itching.   Respiratory: Negative for cough, chest tightness, shortness of breath and wheezing.    Cardiovascular: Negative for chest pain, palpitations and leg swelling.   Gastrointestinal: Negative for abdominal distention, abdominal pain, blood in stool, constipation, diarrhea, nausea and vomiting.        10/15 colonoscopy normal   Endocrine: Negative for polydipsia and polyuria.   Genitourinary: Negative for difficulty urinating, dysuria, frequency and hematuria.   Musculoskeletal: Positive for arthralgias. Negative for gait problem and myalgias.        Knee pain on R>L   Skin: Negative for rash and wound.   Neurological: Negative for dizziness, weakness and headaches.   Psychiatric/Behavioral: Positive for behavioral problems and decreased concentration. Negative for dysphoric mood.       /70   Pulse 72   Temp 97.1 °F (36.2 °C) (Infrared)   Ht 170.2 cm (67.01\")   Wt 111 kg (244 lb)   BMI 38.21 kg/m²       Physical Exam  Constitutional:       Appearance: Normal appearance. He is well-developed.   HENT:      Head: Normocephalic and atraumatic.      Right Ear: External ear normal.      Left Ear: External ear normal.      Nose: Nose normal.      Mouth/Throat:      Mouth: Mucous membranes are moist.      Pharynx: Oropharynx is clear.   Eyes:      Extraocular Movements: Extraocular movements intact.      Conjunctiva/sclera: Conjunctivae normal. "      Pupils: Pupils are equal, round, and reactive to light.   Neck:      Musculoskeletal: Normal range of motion and neck supple.   Cardiovascular:      Rate and Rhythm: Normal rate and regular rhythm.      Heart sounds: Normal heart sounds.   Pulmonary:      Effort: Pulmonary effort is normal.      Breath sounds: Normal breath sounds.   Abdominal:      General: Bowel sounds are normal.      Palpations: Abdomen is soft.   Musculoskeletal: Normal range of motion.      Comments: Bilateral knee crepitus   Lymphadenopathy:      Cervical: No cervical adenopathy.   Skin:     General: Skin is warm and dry.   Neurological:      General: No focal deficit present.      Mental Status: He is alert and oriented to person, place, and time.   Psychiatric:         Mood and Affect: Mood normal.         Behavior: Behavior normal.         Thought Content: Thought content normal.         Procedure:      Discussion/Summary:    Right knee pain-diclofenac prn, stable  gerd-controlled on omeprazole  bipolor/schizophrenia-/depresson-advised compliance with meds , controlled  constipation-colace bid, increase fiber, not an issue  Edema-cont maxzide prn  Abnormal lft-work up noted, recheck normalized  FH of colon ca-wants to defer the colonoscopy      6/24 Labs noted and dw patient, counseled on low carb/ncs   Call [phone]    Current Outpatient Medications:   •  ABILIFY MAINTENA 400 MG Suspension Reconstituted ER IM injection ER, , Disp: , Rfl:   •  aspirin (aspirin) 81 MG EC tablet, Take 1 tablet by mouth Daily., Disp: 30 tablet, Rfl: 11  •  benztropine (COGENTIN) 1 MG tablet, Take 1 tablet by mouth 2 (two) times a day., Disp: , Rfl:   •  diclofenac (CATAFLAM) 50 MG tablet, TAKE 1 TABLET EVERY 8 HOURS AS NEEDED FOR PAIN, Disp: , Rfl: 0  •  fluphenazine decanoate (PROLIXIN) 25 MG/ML injection, , Disp: , Rfl:   •  hydrOXYzine (VISTARIL) 25 MG capsule, 3 (Three) Times a Day As Needed., Disp: , Rfl:   •  omeprazole (priLOSEC) 40 MG  capsule, Take 1 capsule by mouth Daily., Disp: 30 capsule, Rfl: 2  •  ondansetron (ZOFRAN) 4 MG tablet, Take 1 tablet by mouth Every 4 (Four) Hours As Needed for Nausea., Disp: 30 tablet, Rfl: 0  •  triamterene-hydrochlorothiazide (MAXZIDE-25) 37.5-25 MG per tablet, Take 1 tablet by mouth Daily As Needed (edema)., Disp: 30 tablet, Rfl: 11        Diagnoses and all orders for this visit:    1. Gastroesophageal reflux disease without esophagitis (Primary)    2. Chronic pain of right knee    3. Undifferentiated schizophrenia (CMS/HCC)    4. Depression, unspecified depression type    5. Bilateral edema of lower extremity    6. Bipolar affective disorder, current episode hypomanic (CMS/HCC)

## 2021-03-01 ENCOUNTER — OFFICE VISIT (OUTPATIENT)
Dept: INTERNAL MEDICINE | Facility: CLINIC | Age: 36
End: 2021-03-01

## 2021-03-01 VITALS
WEIGHT: 253 LBS | BODY MASS INDEX: 39.71 KG/M2 | TEMPERATURE: 96.9 F | HEIGHT: 67 IN | DIASTOLIC BLOOD PRESSURE: 70 MMHG | HEART RATE: 72 BPM | SYSTOLIC BLOOD PRESSURE: 110 MMHG

## 2021-03-01 DIAGNOSIS — R60.0 BILATERAL EDEMA OF LOWER EXTREMITY: ICD-10-CM

## 2021-03-01 DIAGNOSIS — F20.3 UNDIFFERENTIATED SCHIZOPHRENIA (HCC): ICD-10-CM

## 2021-03-01 DIAGNOSIS — K21.9 GASTROESOPHAGEAL REFLUX DISEASE WITHOUT ESOPHAGITIS: Primary | ICD-10-CM

## 2021-03-01 DIAGNOSIS — G89.29 CHRONIC PAIN OF RIGHT KNEE: ICD-10-CM

## 2021-03-01 DIAGNOSIS — K76.0 FATTY LIVER DISEASE, NONALCOHOLIC: ICD-10-CM

## 2021-03-01 DIAGNOSIS — F31.0 BIPOLAR AFFECTIVE DISORDER, CURRENT EPISODE HYPOMANIC (HCC): ICD-10-CM

## 2021-03-01 DIAGNOSIS — M25.561 CHRONIC PAIN OF RIGHT KNEE: ICD-10-CM

## 2021-03-01 PROCEDURE — 99214 OFFICE O/P EST MOD 30 MIN: CPT | Performed by: INTERNAL MEDICINE

## 2021-03-01 NOTE — PROGRESS NOTES
Patient is a 35 y.o. male who is here for a follow up of anxiety.  Chief Complaint   Patient presents with   • Anxiety         HPI:    Here for mgmt of GERD and Edema and elevated liver enzymes.  No dizziness or lightheadedness.  Has gained 9 pounds.  GERD is controlled.  Has cut back on soda.  Edema is controlled.  No fever or chills.  No nausea or emesis.     History:     Patient Active Problem List   Diagnosis   • Anxiety   • Bipolar affective disorder (CMS/HCC)   • Depression   • Gastroesophageal reflux disease without esophagitis   • Knee pain   • Schizophrenia (CMS/HCC)   • Bilateral edema of lower extremity   • Abnormal EKG   • Fatty liver disease, nonalcoholic       Past Medical History:   Diagnosis Date   • Abnormal ECG    • Orchitis    • Plantar fasciitis        Past Surgical History:   Procedure Laterality Date   • LEG SURGERY  05/03/2013    Right Knee       Current Outpatient Medications on File Prior to Visit   Medication Sig   • ABILIFY MAINTENA 400 MG Suspension Reconstituted ER IM injection ER    • aspirin (aspirin) 81 MG EC tablet Take 1 tablet by mouth Daily.   • benztropine (COGENTIN) 1 MG tablet Take 1 tablet by mouth 2 (two) times a day.   • diclofenac (CATAFLAM) 50 MG tablet TAKE 1 TABLET EVERY 8 HOURS AS NEEDED FOR PAIN   • fluphenazine decanoate (PROLIXIN) 25 MG/ML injection    • hydrOXYzine (VISTARIL) 25 MG capsule 3 (Three) Times a Day As Needed.   • omeprazole (priLOSEC) 40 MG capsule Take 1 capsule by mouth Daily.   • ondansetron (ZOFRAN) 4 MG tablet Take 1 tablet by mouth Every 4 (Four) Hours As Needed for Nausea.   • triamterene-hydrochlorothiazide (MAXZIDE-25) 37.5-25 MG per tablet Take 1 tablet by mouth Daily As Needed (edema).     No current facility-administered medications on file prior to visit.        Family History   Problem Relation Age of Onset   • Diabetes Mother    • Hypertension Mother    • Atrial fibrillation Father    • Colon cancer Sister        Social History  "    Socioeconomic History   • Marital status: Single     Spouse name: Not on file   • Number of children: Not on file   • Years of education: Not on file   • Highest education level: Not on file   Tobacco Use   • Smoking status: Never Smoker   • Smokeless tobacco: Never Used   Substance and Sexual Activity   • Alcohol use: Never     Frequency: Never   • Drug use: Never   • Sexual activity: Defer         Review of Systems   Constitutional: Positive for unexpected weight change. Negative for chills, diaphoresis, fatigue and fever.   HENT: Negative for congestion, ear pain, hearing loss, nosebleeds, postnasal drip, sinus pressure and sore throat.    Eyes: Negative for pain, discharge and itching.   Respiratory: Negative for cough, chest tightness, shortness of breath and wheezing.    Cardiovascular: Negative for chest pain, palpitations and leg swelling.   Gastrointestinal: Negative for abdominal distention, abdominal pain, blood in stool, constipation, diarrhea, nausea and vomiting.        10/15 colonoscopy normal   Endocrine: Negative for polydipsia and polyuria.   Genitourinary: Negative for difficulty urinating, dysuria, frequency and hematuria.   Musculoskeletal: Positive for arthralgias. Negative for gait problem and myalgias.        Knee pain on R>L   Skin: Negative for rash and wound.   Neurological: Negative for dizziness, weakness and headaches.   Psychiatric/Behavioral: Positive for behavioral problems and decreased concentration. Negative for dysphoric mood.       /70   Pulse 72   Temp 96.9 °F (36.1 °C) (Infrared)   Ht 170.2 cm (67.01\")   Wt 115 kg (253 lb)   BMI 39.62 kg/m²       Physical Exam  Constitutional:       Appearance: Normal appearance. He is well-developed.   HENT:      Head: Normocephalic and atraumatic.      Right Ear: External ear normal.      Left Ear: External ear normal.      Nose: Nose normal.      Mouth/Throat:      Mouth: Mucous membranes are moist.      Pharynx: Oropharynx " is clear.   Eyes:      Extraocular Movements: Extraocular movements intact.      Conjunctiva/sclera: Conjunctivae normal.      Pupils: Pupils are equal, round, and reactive to light.   Neck:      Musculoskeletal: Normal range of motion and neck supple.   Cardiovascular:      Rate and Rhythm: Normal rate and regular rhythm.      Heart sounds: Normal heart sounds.   Pulmonary:      Effort: Pulmonary effort is normal.      Breath sounds: Normal breath sounds.   Abdominal:      General: Bowel sounds are normal.      Palpations: Abdomen is soft.   Musculoskeletal: Normal range of motion.      Comments: Bilateral knee crepitus   Lymphadenopathy:      Cervical: No cervical adenopathy.   Skin:     General: Skin is warm and dry.   Neurological:      General: No focal deficit present.      Mental Status: He is alert and oriented to person, place, and time.   Psychiatric:         Mood and Affect: Mood normal.         Behavior: Behavior normal.         Thought Content: Thought content normal.         Procedure:      Discussion/Summary:    knee pain-diclofenac prn, stable  gerd-stable on omeprazole  bipolor/schizophrenia-/depresson-advised compliance with meds , controlled  constipation-colace bid, increase fiber, not an issue  Edema-cont maxzide prn, stable  Abnormal lft/?fld-work up noted, recheck on rtc, advised wt loss  FH of colon ca-wants to defer the colonoscopy, advised wt loss      6/24 Labs noted and dw patient, counseled on low carb/ncs   Call [phone]    Current Outpatient Medications:   •  ABILIFY MAINTENA 400 MG Suspension Reconstituted ER IM injection ER, , Disp: , Rfl:   •  aspirin (aspirin) 81 MG EC tablet, Take 1 tablet by mouth Daily., Disp: 30 tablet, Rfl: 11  •  benztropine (COGENTIN) 1 MG tablet, Take 1 tablet by mouth 2 (two) times a day., Disp: , Rfl:   •  diclofenac (CATAFLAM) 50 MG tablet, TAKE 1 TABLET EVERY 8 HOURS AS NEEDED FOR PAIN, Disp: , Rfl: 0  •  fluphenazine decanoate (PROLIXIN) 25 MG/ML  injection, , Disp: , Rfl:   •  hydrOXYzine (VISTARIL) 25 MG capsule, 3 (Three) Times a Day As Needed., Disp: , Rfl:   •  omeprazole (priLOSEC) 40 MG capsule, Take 1 capsule by mouth Daily., Disp: 30 capsule, Rfl: 2  •  ondansetron (ZOFRAN) 4 MG tablet, Take 1 tablet by mouth Every 4 (Four) Hours As Needed for Nausea., Disp: 30 tablet, Rfl: 0  •  triamterene-hydrochlorothiazide (MAXZIDE-25) 37.5-25 MG per tablet, Take 1 tablet by mouth Daily As Needed (edema)., Disp: 30 tablet, Rfl: 11        Diagnoses and all orders for this visit:    1. Gastroesophageal reflux disease without esophagitis (Primary)    2. Undifferentiated schizophrenia (CMS/HCC)    3. Bipolar affective disorder, current episode hypomanic (CMS/HCC)    4. Chronic pain of right knee    5. Bilateral edema of lower extremity    6. Fatty liver disease, nonalcoholic

## 2021-07-06 ENCOUNTER — OFFICE VISIT (OUTPATIENT)
Dept: INTERNAL MEDICINE | Facility: CLINIC | Age: 36
End: 2021-07-06

## 2021-07-06 VITALS
TEMPERATURE: 96.9 F | OXYGEN SATURATION: 98 % | WEIGHT: 252 LBS | HEIGHT: 67 IN | DIASTOLIC BLOOD PRESSURE: 78 MMHG | HEART RATE: 69 BPM | BODY MASS INDEX: 39.55 KG/M2 | SYSTOLIC BLOOD PRESSURE: 120 MMHG

## 2021-07-06 DIAGNOSIS — K76.0 FATTY LIVER DISEASE, NONALCOHOLIC: ICD-10-CM

## 2021-07-06 DIAGNOSIS — F31.0 BIPOLAR AFFECTIVE DISORDER, CURRENT EPISODE HYPOMANIC (HCC): ICD-10-CM

## 2021-07-06 DIAGNOSIS — K21.9 GASTROESOPHAGEAL REFLUX DISEASE WITHOUT ESOPHAGITIS: Primary | ICD-10-CM

## 2021-07-06 DIAGNOSIS — Z12.11 SCREEN FOR COLON CANCER: ICD-10-CM

## 2021-07-06 DIAGNOSIS — R60.0 BILATERAL EDEMA OF LOWER EXTREMITY: ICD-10-CM

## 2021-07-06 PROCEDURE — 99214 OFFICE O/P EST MOD 30 MIN: CPT | Performed by: INTERNAL MEDICINE

## 2021-07-06 NOTE — PROGRESS NOTES
Patient is a 36 y.o. male who is here for a follow up of anxiety.  Chief Complaint   Patient presents with   • Anxiety         HPI:    Here for mgmt of GERD and constipation and knee pain.  GERD is controlled.  No nausea or emesis.  Difficulty loosing weight.  No dizziness or lightheadedness.  Occasional HAs.  No fever or chills.  Constipation is under control.  Did not get colonoscopy yet.  Sleeping well.  Energy level is good.      History:     Patient Active Problem List   Diagnosis   • Anxiety   • Bipolar affective disorder (CMS/HCC)   • Depression   • Gastroesophageal reflux disease without esophagitis   • Knee pain   • Schizophrenia (CMS/HCC)   • Bilateral edema of lower extremity   • Abnormal EKG   • Fatty liver disease, nonalcoholic       Past Medical History:   Diagnosis Date   • Abnormal ECG    • Orchitis    • Plantar fasciitis        Past Surgical History:   Procedure Laterality Date   • LEG SURGERY  05/03/2013    Right Knee       Current Outpatient Medications on File Prior to Visit   Medication Sig   • ABILIFY MAINTENA 400 MG Suspension Reconstituted ER IM injection ER    • aspirin (aspirin) 81 MG EC tablet Take 1 tablet by mouth Daily.   • benztropine (COGENTIN) 1 MG tablet Take 1 tablet by mouth 2 (two) times a day.   • diclofenac (CATAFLAM) 50 MG tablet TAKE 1 TABLET EVERY 8 HOURS AS NEEDED FOR PAIN   • fluphenazine decanoate (PROLIXIN) 25 MG/ML injection    • hydrOXYzine (VISTARIL) 25 MG capsule 3 (Three) Times a Day As Needed.   • omeprazole (priLOSEC) 40 MG capsule Take 1 capsule by mouth Daily.   • ondansetron (ZOFRAN) 4 MG tablet Take 1 tablet by mouth Every 4 (Four) Hours As Needed for Nausea.   • triamterene-hydrochlorothiazide (MAXZIDE-25) 37.5-25 MG per tablet Take 1 tablet by mouth Daily As Needed (edema).     No current facility-administered medications on file prior to visit.       Family History   Problem Relation Age of Onset   • Diabetes Mother    • Hypertension Mother    • Atrial  "fibrillation Father    • Colon cancer Sister        Social History     Socioeconomic History   • Marital status: Single     Spouse name: Not on file   • Number of children: Not on file   • Years of education: Not on file   • Highest education level: Not on file   Tobacco Use   • Smoking status: Never Smoker   • Smokeless tobacco: Never Used   Substance and Sexual Activity   • Alcohol use: Never   • Drug use: Never   • Sexual activity: Defer         Review of Systems   Constitutional: Negative for chills, diaphoresis, fatigue and fever.   HENT: Negative for congestion, ear pain, hearing loss, nosebleeds, postnasal drip, sinus pressure and sore throat.    Eyes: Negative for pain, discharge and itching.   Respiratory: Negative for cough, chest tightness, shortness of breath and wheezing.    Cardiovascular: Negative for chest pain, palpitations and leg swelling.   Gastrointestinal: Negative for abdominal distention, abdominal pain, blood in stool, constipation, diarrhea, nausea and vomiting.        10/15 colonoscopy normal   Endocrine: Negative for polydipsia and polyuria.   Genitourinary: Negative for difficulty urinating, dysuria, frequency and hematuria.   Musculoskeletal: Positive for arthralgias. Negative for gait problem and myalgias.        Knee pain on R>L   Skin: Negative for rash and wound.   Neurological: Positive for headaches. Negative for dizziness and weakness.   Psychiatric/Behavioral: Positive for behavioral problems and decreased concentration. Negative for dysphoric mood.       /78   Pulse 69   Temp 96.9 °F (36.1 °C) (Infrared)   Ht 170.2 cm (67.01\")   Wt 114 kg (252 lb)   SpO2 98%   BMI 39.46 kg/m²       Physical Exam  Constitutional:       Appearance: Normal appearance. He is well-developed. He is obese.   HENT:      Head: Normocephalic and atraumatic.      Right Ear: External ear normal.      Left Ear: External ear normal.      Nose: Nose normal.      Mouth/Throat:      Mouth: Mucous " membranes are moist.      Pharynx: Oropharynx is clear.   Eyes:      Extraocular Movements: Extraocular movements intact.      Conjunctiva/sclera: Conjunctivae normal.      Pupils: Pupils are equal, round, and reactive to light.   Cardiovascular:      Rate and Rhythm: Normal rate and regular rhythm.      Heart sounds: Normal heart sounds.   Pulmonary:      Effort: Pulmonary effort is normal.      Breath sounds: Normal breath sounds.   Abdominal:      General: Bowel sounds are normal.      Palpations: Abdomen is soft.   Musculoskeletal:         General: Normal range of motion.      Cervical back: Normal range of motion and neck supple.      Comments: Bilateral knee crepitus   Lymphadenopathy:      Cervical: No cervical adenopathy.   Skin:     General: Skin is warm and dry.   Neurological:      General: No focal deficit present.      Mental Status: He is alert and oriented to person, place, and time.   Psychiatric:         Mood and Affect: Mood normal.         Behavior: Behavior normal.         Thought Content: Thought content normal.         Procedure:      Discussion/Summary:    knee pain-diclofenac prn, not bothersome  gerd-stable on omeprazole  bipolor/schizophrenia-/depresson-advised compliance with meds , controlled  constipation-colace bid, increase fiber, not an issue  Edema-cont maxzide prn, stable  Abnormal lft/?fld-work up noted, recheck on rtc, advised wt loss  FH of colon ca-will schedule colonoscopy      6/24 Labs noted and dw patient, counseled on low carb/ncs     Current Outpatient Medications:   •  ABILIFY MAINTENA 400 MG Suspension Reconstituted ER IM injection ER, , Disp: , Rfl:   •  aspirin (aspirin) 81 MG EC tablet, Take 1 tablet by mouth Daily., Disp: 30 tablet, Rfl: 11  •  benztropine (COGENTIN) 1 MG tablet, Take 1 tablet by mouth 2 (two) times a day., Disp: , Rfl:   •  diclofenac (CATAFLAM) 50 MG tablet, TAKE 1 TABLET EVERY 8 HOURS AS NEEDED FOR PAIN, Disp: , Rfl: 0  •  fluphenazine decanoate  (PROLIXIN) 25 MG/ML injection, , Disp: , Rfl:   •  hydrOXYzine (VISTARIL) 25 MG capsule, 3 (Three) Times a Day As Needed., Disp: , Rfl:   •  omeprazole (priLOSEC) 40 MG capsule, Take 1 capsule by mouth Daily., Disp: 30 capsule, Rfl: 2  •  ondansetron (ZOFRAN) 4 MG tablet, Take 1 tablet by mouth Every 4 (Four) Hours As Needed for Nausea., Disp: 30 tablet, Rfl: 0  •  triamterene-hydrochlorothiazide (MAXZIDE-25) 37.5-25 MG per tablet, Take 1 tablet by mouth Daily As Needed (edema)., Disp: 30 tablet, Rfl: 11        Diagnoses and all orders for this visit:    1. Gastroesophageal reflux disease without esophagitis (Primary)    2. Fatty liver disease, nonalcoholic    3. Bipolar affective disorder, current episode hypomanic (CMS/HCC)    4. Bilateral edema of lower extremity    5. Screen for colon cancer  -     Ambulatory Referral to Gastroenterology

## 2021-11-08 ENCOUNTER — TELEPHONE (OUTPATIENT)
Dept: INTERNAL MEDICINE | Facility: CLINIC | Age: 36
End: 2021-11-08

## 2021-11-08 NOTE — TELEPHONE ENCOUNTER
Caller: [email or web address]    Relationship: Self    Best call back number: [phone]     What is the medical concern/diagnosis: NERVE DAMAGE LEFT EYE     What specialty or service is being requested: NEUROLOGIST     What is the provider, practice or medical service name: [facility] PROVIDER     What is the office location: [place]    What is the office phone number: N/A    Any additional details: PATIENT NEEDS A REFERRAL TO A NEUROGOLIST WITHIN [facility]. PATIENT HAS AN APPOINTMENT WITH DR SPENCE 11/09/21. PLEASE ADVISE

## 2021-11-09 ENCOUNTER — OFFICE VISIT (OUTPATIENT)
Dept: INTERNAL MEDICINE | Facility: CLINIC | Age: 36
End: 2021-11-09

## 2021-11-09 ENCOUNTER — LAB (OUTPATIENT)
Dept: LAB | Facility: HOSPITAL | Age: 36
End: 2021-11-09

## 2021-11-09 VITALS
DIASTOLIC BLOOD PRESSURE: 70 MMHG | TEMPERATURE: 97.1 F | SYSTOLIC BLOOD PRESSURE: 110 MMHG | HEART RATE: 85 BPM | WEIGHT: 249 LBS | BODY MASS INDEX: 39.08 KG/M2 | OXYGEN SATURATION: 97 % | HEIGHT: 67 IN

## 2021-11-09 DIAGNOSIS — K21.9 GASTROESOPHAGEAL REFLUX DISEASE WITHOUT ESOPHAGITIS: Primary | ICD-10-CM

## 2021-11-09 DIAGNOSIS — E78.49 OTHER HYPERLIPIDEMIA: ICD-10-CM

## 2021-11-09 DIAGNOSIS — Z23 NEED FOR INFLUENZA VACCINATION: ICD-10-CM

## 2021-11-09 DIAGNOSIS — K76.0 FATTY LIVER DISEASE, NONALCOHOLIC: ICD-10-CM

## 2021-11-09 DIAGNOSIS — R60.0 BILATERAL EDEMA OF LOWER EXTREMITY: ICD-10-CM

## 2021-11-09 DIAGNOSIS — F31.0 BIPOLAR AFFECTIVE DISORDER, CURRENT EPISODE HYPOMANIC (HCC): ICD-10-CM

## 2021-11-09 LAB
DEPRECATED RDW RBC AUTO: 39.5 FL (ref 37–54)
ERYTHROCYTE [DISTWIDTH] IN BLOOD BY AUTOMATED COUNT: 13 % (ref 12.3–15.4)
HBA1C MFR BLD: 5.54 % (ref 4.8–5.6)
HCT VFR BLD AUTO: 45 % (ref 37.5–51)
HGB BLD-MCNC: 15.4 G/DL (ref 13–17.7)
MCH RBC QN AUTO: 28.9 PG (ref 26.6–33)
MCHC RBC AUTO-ENTMCNC: 34.2 G/DL (ref 31.5–35.7)
MCV RBC AUTO: 84.6 FL (ref 79–97)
PLATELET # BLD AUTO: 317 10*3/MM3 (ref 140–450)
PMV BLD AUTO: 10 FL (ref 6–12)
RBC # BLD AUTO: 5.32 10*6/MM3 (ref 4.14–5.8)
WBC # BLD AUTO: 13.23 10*3/MM3 (ref 3.4–10.8)

## 2021-11-09 PROCEDURE — 83036 HEMOGLOBIN GLYCOSYLATED A1C: CPT | Performed by: INTERNAL MEDICINE

## 2021-11-09 PROCEDURE — 99214 OFFICE O/P EST MOD 30 MIN: CPT | Performed by: INTERNAL MEDICINE

## 2021-11-09 PROCEDURE — 90471 IMMUNIZATION ADMIN: CPT | Performed by: INTERNAL MEDICINE

## 2021-11-09 PROCEDURE — 80061 LIPID PANEL: CPT | Performed by: INTERNAL MEDICINE

## 2021-11-09 PROCEDURE — 90686 IIV4 VACC NO PRSV 0.5 ML IM: CPT | Performed by: INTERNAL MEDICINE

## 2021-11-09 PROCEDURE — 80050 GENERAL HEALTH PANEL: CPT | Performed by: INTERNAL MEDICINE

## 2021-11-09 NOTE — PROGRESS NOTES
Patient is a 36 y.o. male who is here for a follow up of anxiety.  Chief Complaint   Patient presents with   • Anxiety         HPI:    Here for mgmt of GERD and DJD.  Recently placed on prednisone for ?inflammation in his eye, causing nerve damage.  Seeing [facility] MD.  No HAs.  No dizziness or lightheadedness.  GERD is controlled.  No abdominal pains.     History:     Patient Active Problem List   Diagnosis   • Anxiety   • Bipolar affective disorder (HCC)   • Depression   • Gastroesophageal reflux disease without esophagitis   • Knee pain   • Schizophrenia (HCC)   • Bilateral edema of lower extremity   • Abnormal EKG   • Fatty liver disease, nonalcoholic   • Other hyperlipidemia       Past Medical History:   Diagnosis Date   • Abnormal ECG    • Orchitis    • Plantar fasciitis        Past Surgical History:   Procedure Laterality Date   • LEG SURGERY  05/03/2013    Right Knee       Current Outpatient Medications on File Prior to Visit   Medication Sig   • ABILIFY MAINTENA 400 MG Suspension Reconstituted ER IM injection ER    • aspirin (aspirin) 81 MG EC tablet Take 1 tablet by mouth Daily.   • benztropine (COGENTIN) 1 MG tablet Take 1 tablet by mouth 2 (two) times a day.   • diclofenac (CATAFLAM) 50 MG tablet TAKE 1 TABLET EVERY 8 HOURS AS NEEDED FOR PAIN   • fluphenazine decanoate (PROLIXIN) 25 MG/ML injection    • hydrOXYzine (VISTARIL) 25 MG capsule 3 (Three) Times a Day As Needed.   • omeprazole (priLOSEC) 40 MG capsule Take 1 capsule by mouth Daily.   • ondansetron (ZOFRAN) 4 MG tablet Take 1 tablet by mouth Every 4 (Four) Hours As Needed for Nausea.   • triamterene-hydrochlorothiazide (MAXZIDE-25) 37.5-25 MG per tablet Take 1 tablet by mouth Daily As Needed (edema).     No current facility-administered medications on file prior to visit.       Family History   Problem Relation Age of Onset   • Diabetes Mother    • Hypertension Mother    • Atrial fibrillation Father    • Colon cancer Sister        Social History  "    Socioeconomic History   • Marital status: Single   Tobacco Use   • Smoking status: Never Smoker   • Smokeless tobacco: Never Used   Substance and Sexual Activity   • Alcohol use: Never   • Drug use: Never   • Sexual activity: Defer         Review of Systems   Constitutional: Negative for chills, diaphoresis, fatigue and fever.   HENT: Negative for congestion, ear pain, hearing loss, nosebleeds, postnasal drip, sinus pressure and sore throat.    Eyes: Positive for visual disturbance. Negative for pain, discharge and itching.   Respiratory: Negative for cough, chest tightness, shortness of breath and wheezing.    Cardiovascular: Negative for chest pain, palpitations and leg swelling.   Gastrointestinal: Negative for abdominal distention, abdominal pain, blood in stool, constipation, diarrhea, nausea and vomiting.        10/15 colonoscopy normal   Endocrine: Negative for polydipsia and polyuria.   Genitourinary: Negative for difficulty urinating, dysuria, frequency and hematuria.   Musculoskeletal: Positive for arthralgias. Negative for gait problem and myalgias.        Knee pain on R>L   Skin: Negative for rash and wound.   Neurological: Negative for dizziness and weakness.   Psychiatric/Behavioral: Positive for behavioral problems and decreased concentration. Negative for dysphoric mood.       /70   Pulse 85   Temp 97.1 °F (36.2 °C) (Infrared)   Ht 170.2 cm (67.01\")   Wt 113 kg (249 lb)   SpO2 97%   BMI 38.99 kg/m²       Physical Exam  Constitutional:       Appearance: Normal appearance. He is well-developed. He is obese.   HENT:      Head: Normocephalic and atraumatic.      Right Ear: External ear normal.      Left Ear: External ear normal.      Nose: Nose normal.      Mouth/Throat:      Mouth: Mucous membranes are moist.      Pharynx: Oropharynx is clear.   Eyes:      Extraocular Movements: Extraocular movements intact.      Conjunctiva/sclera: Conjunctivae normal.      Pupils: Pupils are equal, " round, and reactive to light.   Cardiovascular:      Rate and Rhythm: Normal rate and regular rhythm.      Heart sounds: Normal heart sounds.   Pulmonary:      Effort: Pulmonary effort is normal.      Breath sounds: Normal breath sounds.   Abdominal:      General: Bowel sounds are normal.      Palpations: Abdomen is soft.   Musculoskeletal:         General: Normal range of motion.      Cervical back: Normal range of motion and neck supple.      Comments: Bilateral knee crepitus   Lymphadenopathy:      Cervical: No cervical adenopathy.   Skin:     General: Skin is warm and dry.   Neurological:      General: No focal deficit present.      Mental Status: He is alert and oriented to person, place, and time.   Psychiatric:         Mood and Affect: Mood normal.         Behavior: Behavior normal.         Thought Content: Thought content normal.         Procedure:      Discussion/Summary:    knee pain-diclofenac prn, stable  gerd-stable on omeprazole  bipolor/schizophrenia-/depresson-advised compliance with meds , controlled  constipation-colace bid, increase fiber, not an issue  Edema-cont maxzide prn, stable  Abnormal lft/?fld-work up noted, recheck normal, advised wt loss  FH of colon ca-advised colonoscopy  High risk meds-check AIC at goal      11/9 Labs noted and dw patient, counseled on low carb/ncs  and low chol    Current Outpatient Medications:   •  ABILIFY MAINTENA 400 MG Suspension Reconstituted ER IM injection ER, , Disp: , Rfl:   •  aspirin (aspirin) 81 MG EC tablet, Take 1 tablet by mouth Daily., Disp: 30 tablet, Rfl: 11  •  benztropine (COGENTIN) 1 MG tablet, Take 1 tablet by mouth 2 (two) times a day., Disp: , Rfl:   •  diclofenac (CATAFLAM) 50 MG tablet, TAKE 1 TABLET EVERY 8 HOURS AS NEEDED FOR PAIN, Disp: , Rfl: 0  •  fluphenazine decanoate (PROLIXIN) 25 MG/ML injection, , Disp: , Rfl:   •  hydrOXYzine (VISTARIL) 25 MG capsule, 3 (Three) Times a Day As Needed., Disp: , Rfl:   •  omeprazole (priLOSEC) 40  MG capsule, Take 1 capsule by mouth Daily., Disp: 30 capsule, Rfl: 2  •  ondansetron (ZOFRAN) 4 MG tablet, Take 1 tablet by mouth Every 4 (Four) Hours As Needed for Nausea., Disp: 30 tablet, Rfl: 0  •  triamterene-hydrochlorothiazide (MAXZIDE-25) 37.5-25 MG per tablet, Take 1 tablet by mouth Daily As Needed (edema)., Disp: 30 tablet, Rfl: 11        Diagnoses and all orders for this visit:    1. Gastroesophageal reflux disease without esophagitis (Primary)  -     CBC (No Diff)    2. Fatty liver disease, nonalcoholic  -     Comprehensive Metabolic Panel  -     Lipid Panel  -     Hemoglobin A1c  -     TSH    3. Bipolar affective disorder, current episode hypomanic (HCC)    4. Bilateral edema of lower extremity    5. Need for influenza vaccination  -     FluLaval/Fluarix/Fluzone >6 Months ([id number])    6. Other hyperlipidemia

## 2021-11-10 PROBLEM — E78.49 OTHER HYPERLIPIDEMIA: Status: ACTIVE | Noted: 2021-11-10

## 2021-11-10 LAB
ALBUMIN SERPL-MCNC: 4.8 G/DL (ref 3.5–5.2)
ALBUMIN/GLOB SERPL: 1.5 G/DL
ALP SERPL-CCNC: 93 U/L (ref 39–117)
ALT SERPL W P-5'-P-CCNC: 35 U/L (ref 1–41)
ANION GAP SERPL CALCULATED.3IONS-SCNC: 9.3 MMOL/L (ref 5–15)
AST SERPL-CCNC: 24 U/L (ref 1–40)
BILIRUB SERPL-MCNC: 0.3 MG/DL (ref 0–1.2)
BUN SERPL-MCNC: 14 MG/DL (ref 6–20)
BUN/CREAT SERPL: 14.4 (ref 7–25)
CALCIUM SPEC-SCNC: 9.9 MG/DL (ref 8.6–10.5)
CHLORIDE SERPL-SCNC: 102 MMOL/L (ref 98–107)
CHOLEST SERPL-MCNC: 207 MG/DL (ref 0–200)
CO2 SERPL-SCNC: 25.7 MMOL/L (ref 22–29)
CREAT SERPL-MCNC: 0.97 MG/DL (ref 0.76–1.27)
GFR SERPL CREATININE-BSD FRML MDRD: 87 ML/MIN/1.73
GLOBULIN UR ELPH-MCNC: 3.1 GM/DL
GLUCOSE SERPL-MCNC: 114 MG/DL (ref 65–99)
HDLC SERPL-MCNC: 53 MG/DL (ref 40–60)
LDLC SERPL CALC-MCNC: 143 MG/DL (ref 0–100)
LDLC/HDLC SERPL: 2.66 {RATIO}
POTASSIUM SERPL-SCNC: 4.7 MMOL/L (ref 3.5–5.2)
PROT SERPL-MCNC: 7.9 G/DL (ref 6–8.5)
SODIUM SERPL-SCNC: 137 MMOL/L (ref 136–145)
TRIGL SERPL-MCNC: 64 MG/DL (ref 0–150)
TSH SERPL DL<=0.05 MIU/L-ACNC: 1.89 UIU/ML (ref 0.27–4.2)
VLDLC SERPL-MCNC: 11 MG/DL (ref 5–40)

## 2022-03-09 ENCOUNTER — OFFICE VISIT (OUTPATIENT)
Dept: INTERNAL MEDICINE | Facility: CLINIC | Age: 37
End: 2022-03-09

## 2022-03-09 VITALS
BODY MASS INDEX: 40.18 KG/M2 | HEART RATE: 76 BPM | TEMPERATURE: 96.9 F | WEIGHT: 256 LBS | HEIGHT: 67 IN | SYSTOLIC BLOOD PRESSURE: 110 MMHG | DIASTOLIC BLOOD PRESSURE: 78 MMHG | OXYGEN SATURATION: 99 %

## 2022-03-09 DIAGNOSIS — R73.09 ABNORMAL GLUCOSE: ICD-10-CM

## 2022-03-09 DIAGNOSIS — F31.0 BIPOLAR AFFECTIVE DISORDER, CURRENT EPISODE HYPOMANIC: ICD-10-CM

## 2022-03-09 DIAGNOSIS — Z80.0 FAMILY HISTORY OF COLON CANCER: Primary | ICD-10-CM

## 2022-03-09 DIAGNOSIS — G35 MS (MULTIPLE SCLEROSIS): ICD-10-CM

## 2022-03-09 DIAGNOSIS — R60.0 BILATERAL EDEMA OF LOWER EXTREMITY: ICD-10-CM

## 2022-03-09 DIAGNOSIS — K76.0 FATTY LIVER DISEASE, NONALCOHOLIC: ICD-10-CM

## 2022-03-09 DIAGNOSIS — K21.9 GASTROESOPHAGEAL REFLUX DISEASE WITHOUT ESOPHAGITIS: ICD-10-CM

## 2022-03-09 LAB
EXPIRATION DATE: NORMAL
HBA1C MFR BLD: 5.3 %
Lab: NORMAL

## 2022-03-09 PROCEDURE — 83036 HEMOGLOBIN GLYCOSYLATED A1C: CPT | Performed by: INTERNAL MEDICINE

## 2022-03-09 PROCEDURE — 99214 OFFICE O/P EST MOD 30 MIN: CPT | Performed by: INTERNAL MEDICINE

## 2022-03-09 RX ORDER — DIMETHYL FUMARATE 240 MG/1
1 CAPSULE ORAL 2 TIMES DAILY
COMMUNITY
Start: 2022-03-03

## 2022-03-09 NOTE — PROGRESS NOTES
Patient is a 36 y.o. male who is here for a follow up of anxiety.  Chief Complaint   Patient presents with   • Anxiety         HPI:    Here for mgmt of FLD and abnormal glucose and DJD.  Recently diagnosed with MS.  Started on new meds.  Has generalized weakness and vision issues.  Continues to gain weight.  No abdominal pains.  No fever or chills.  No Has.  Knee pain is better.      History:     Patient Active Problem List   Diagnosis   • Anxiety   • Bipolar affective disorder (HCC)   • Depression   • Gastroesophageal reflux disease without esophagitis   • Knee pain   • Schizophrenia (HCC)   • Bilateral edema of lower extremity   • Abnormal EKG   • Fatty liver disease, nonalcoholic   • Other hyperlipidemia   • MS (multiple sclerosis) (HCC)       Past Medical History:   Diagnosis Date   • Abnormal ECG    • Orchitis    • Plantar fasciitis        Past Surgical History:   Procedure Laterality Date   • LEG SURGERY  05/03/2013    Right Knee       Current Outpatient Medications on File Prior to Visit   Medication Sig   • ABILIFY MAINTENA 400 MG Suspension Reconstituted ER IM injection ER    • aspirin (aspirin) 81 MG EC tablet Take 1 tablet by mouth Daily.   • benztropine (COGENTIN) 1 MG tablet Take 1 tablet by mouth 2 (two) times a day.   • diclofenac (CATAFLAM) 50 MG tablet TAKE 1 TABLET EVERY 8 HOURS AS NEEDED FOR PAIN   • Dimethyl Fumarate (TECFIDERA) capsule delayed-release Take 1 tablet by mouth 2 (Two) Times a Day.   • fluphenazine decanoate (PROLIXIN) 25 MG/ML injection    • hydrOXYzine (VISTARIL) 25 MG capsule 3 (Three) Times a Day As Needed.   • omeprazole (priLOSEC) 40 MG capsule Take 1 capsule by mouth Daily.   • ondansetron (ZOFRAN) 4 MG tablet Take 1 tablet by mouth Every 4 (Four) Hours As Needed for Nausea.   • triamterene-hydrochlorothiazide (MAXZIDE-25) 37.5-25 MG per tablet Take 1 tablet by mouth Daily As Needed (edema).     No current facility-administered medications on file prior to visit.       Family  "History   Problem Relation Age of Onset   • Diabetes Mother    • Hypertension Mother    • Atrial fibrillation Father    • Colon cancer Sister        Social History     Socioeconomic History   • Marital status: Single   Tobacco Use   • Smoking status: Never Smoker   • Smokeless tobacco: Never Used   Substance and Sexual Activity   • Alcohol use: Never   • Drug use: Never   • Sexual activity: Defer         Review of Systems   Constitutional: Positive for fatigue. Negative for chills, diaphoresis and fever.   HENT: Negative for congestion, ear pain, hearing loss, nosebleeds, postnasal drip, sinus pressure and sore throat.    Eyes: Positive for visual disturbance. Negative for pain, discharge and itching.   Respiratory: Negative for cough, chest tightness, shortness of breath and wheezing.    Cardiovascular: Negative for chest pain, palpitations and leg swelling.   Gastrointestinal: Negative for abdominal distention, abdominal pain, blood in stool, constipation, diarrhea, nausea and vomiting.        10/15 colonoscopy normal   Endocrine: Negative for polydipsia and polyuria.   Genitourinary: Negative for difficulty urinating, dysuria, frequency and hematuria.   Musculoskeletal: Positive for arthralgias. Negative for gait problem and myalgias.        Knee pain on R>L   Skin: Negative for rash and wound.   Neurological: Positive for weakness. Negative for dizziness.   Psychiatric/Behavioral: Positive for behavioral problems and decreased concentration. Negative for dysphoric mood.       /78   Pulse 76   Temp 96.9 °F (36.1 °C) (Infrared)   Ht 170.2 cm (67.01\")   Wt 116 kg (256 lb)   SpO2 99%   BMI 40.09 kg/m²       Physical Exam  Constitutional:       Appearance: Normal appearance. He is well-developed. He is obese.   HENT:      Head: Normocephalic and atraumatic.      Right Ear: External ear normal.      Left Ear: External ear normal.      Nose: Nose normal.      Mouth/Throat:      Mouth: Mucous membranes are " moist.      Pharynx: Oropharynx is clear.   Eyes:      Extraocular Movements: Extraocular movements intact.      Conjunctiva/sclera: Conjunctivae normal.      Pupils: Pupils are equal, round, and reactive to light.   Cardiovascular:      Rate and Rhythm: Normal rate and regular rhythm.      Heart sounds: Normal heart sounds.   Pulmonary:      Effort: Pulmonary effort is normal.      Breath sounds: Normal breath sounds.   Abdominal:      General: Bowel sounds are normal.      Palpations: Abdomen is soft.   Musculoskeletal:         General: Normal range of motion.      Cervical back: Normal range of motion and neck supple.      Comments: Bilateral knee crepitus   Lymphadenopathy:      Cervical: No cervical adenopathy.   Skin:     General: Skin is warm and dry.   Neurological:      General: No focal deficit present.      Mental Status: He is alert and oriented to person, place, and time.   Psychiatric:         Mood and Affect: Mood normal.         Behavior: Behavior normal.         Thought Content: Thought content normal.         Procedure:      Discussion/Summary:    knee pain-diclofenac prn, stable  gerd-stable on omeprazole  bipolor/schizophrenia-/depresson-advised compliance with meds , controlled  constipation-colace bid, increase fiber, not an issue  Edema-cont maxzide prn, stable  Abnormal lft/?fld-work up noted, recheck normal, advised wt loss  FH of colon ca-advised colonoscopy  Abnormal glucose-check AIC       11/9 Labs noted and dw patient, counseled on low carb/ncs  and low chol       Current Outpatient Medications:   •  ABILIFY MAINTENA 400 MG Suspension Reconstituted ER IM injection ER, , Disp: , Rfl:   •  aspirin (aspirin) 81 MG EC tablet, Take 1 tablet by mouth Daily., Disp: 30 tablet, Rfl: 11  •  benztropine (COGENTIN) 1 MG tablet, Take 1 tablet by mouth 2 (two) times a day., Disp: , Rfl:   •  diclofenac (CATAFLAM) 50 MG tablet, TAKE 1 TABLET EVERY 8 HOURS AS NEEDED FOR PAIN, Disp: , Rfl: 0  •   Dimethyl Fumarate (TECFIDERA) capsule delayed-release, Take 1 tablet by mouth 2 (Two) Times a Day., Disp: , Rfl:   •  fluphenazine decanoate (PROLIXIN) 25 MG/ML injection, , Disp: , Rfl:   •  hydrOXYzine (VISTARIL) 25 MG capsule, 3 (Three) Times a Day As Needed., Disp: , Rfl:   •  omeprazole (priLOSEC) 40 MG capsule, Take 1 capsule by mouth Daily., Disp: 30 capsule, Rfl: 2  •  ondansetron (ZOFRAN) 4 MG tablet, Take 1 tablet by mouth Every 4 (Four) Hours As Needed for Nausea., Disp: 30 tablet, Rfl: 0  •  triamterene-hydrochlorothiazide (MAXZIDE-25) 37.5-25 MG per tablet, Take 1 tablet by mouth Daily As Needed (edema)., Disp: 30 tablet, Rfl: 11        Diagnoses and all orders for this visit:    1. Family history of colon cancer (Primary)  -     Ambulatory Referral to Gastroenterology    2. Fatty liver disease, nonalcoholic    3. Gastroesophageal reflux disease without esophagitis    4. Bipolar affective disorder, current episode hypomanic (HCC)    5. MS (multiple sclerosis) (HCC)    6. Bilateral edema of lower extremity    7. Abnormal glucose  -     POC Glycosylated Hemoglobin (Hb A1C)        Answers for HPI/ROS submitted by the patient on 3/7/2022  What is the primary reason for your visit?: Physical

## 2022-03-17 ENCOUNTER — TELEPHONE (OUTPATIENT)
Dept: INTERNAL MEDICINE | Facility: CLINIC | Age: 37
End: 2022-03-17

## 2022-03-17 RX ORDER — DIMETHYL FUMARATE 240 MG/1
240 CAPSULE ORAL 2 TIMES DAILY
Qty: 60 CAPSULE | Status: CANCELLED | OUTPATIENT
Start: 2022-03-17

## 2022-03-17 NOTE — TELEPHONE ENCOUNTER
Caller: Geremias Serrano    Relationship: Self    Best call back number: [phone]    What is the best time to reach you: ANYTIME    Who are you requesting to speak with (clinical staff, provider,  specific staff member): JACQUELINE    Do you know the name of the person who called: SELF    What was the call regarding: PATIENT STATES THAT HE WOULD LIKE A CALL FROM JACQUELINE ABOUT HIS MEDICATION Dimethyl Fumarate (TECFIDERA) capsule delayed-release.     Do you require a callback: YES

## 2022-03-29 ENCOUNTER — PRIOR AUTHORIZATION (OUTPATIENT)
Dept: GASTROENTEROLOGY | Facility: CLINIC | Age: 37
End: 2022-03-29

## 2022-03-29 DIAGNOSIS — Z12.11 SCREENING FOR COLON CANCER: Primary | ICD-10-CM

## 2022-04-10 ENCOUNTER — TELEPHONE (OUTPATIENT)
Dept: GASTROENTEROLOGY | Facility: OTHER | Age: 37
End: 2022-04-10

## 2022-04-10 DIAGNOSIS — Z12.11 SCREENING FOR COLON CANCER: ICD-10-CM

## 2022-04-10 NOTE — TELEPHONE ENCOUNTER
Patient states he did not receive prep or instructions for colonoscopy tomorrow.  Sent prescription for alba prep to [facility].  Instructed patient to take half tonight at 6 PM and the other half at 5 AM tomorrow, for a 9 AM arrival for colonoscopy.

## 2022-06-02 ENCOUNTER — TELEPHONE (OUTPATIENT)
Dept: INTERNAL MEDICINE | Facility: CLINIC | Age: 37
End: 2022-06-02

## 2022-06-02 NOTE — TELEPHONE ENCOUNTER
Caller: Geremias Serrano    Relationship: Self    Best call back number: [phone]    What is the best time to reach you: ANY    Who are you requesting to speak with (clinical staff, provider,  specific staff member): WAS TOLD SOMEONE WOULD CALL BACK- WAITING FOR A CALL BACK    Do you know the name of the person who called: DOES NOT KNOW WHO HE SPOKE TO    What was the call regarding: STATES CALLED 6/2/22 AND SPOKE TO THE OFFICE- WAS TOLD WE WOULD CALL BACK REGARDING THE HANDI-CAP STICKER. STATES HE'S NEVER HAD ONE BEFORE AND IS ASKING FOR PROCESS OF GETTING THE HANDI-CAP STICKER.    Do you require a callback: YES, PLEASE    STATES HE GOT A PARKING TICKET DUE TO THE ISSUE WITH HIS LEG AND IS NEEDING THE HANDI-CAP STICKER.    [facility] WAS TOLD TO CALL PRIMARY DOCTOR.    HIGH PRIORITY ONLY DUE TO STATEING THIS IS A REPEATED CALL AND I DO NOT SEE A PREVIOUS MESSAGE IN THE CHART.

## 2022-07-13 ENCOUNTER — OFFICE VISIT (OUTPATIENT)
Dept: INTERNAL MEDICINE | Facility: CLINIC | Age: 37
End: 2022-07-13

## 2022-07-13 VITALS
TEMPERATURE: 96.9 F | HEART RATE: 79 BPM | DIASTOLIC BLOOD PRESSURE: 72 MMHG | OXYGEN SATURATION: 97 % | SYSTOLIC BLOOD PRESSURE: 120 MMHG | BODY MASS INDEX: 39.55 KG/M2 | HEIGHT: 67 IN | WEIGHT: 252 LBS

## 2022-07-13 DIAGNOSIS — F31.0 BIPOLAR AFFECTIVE DISORDER, CURRENT EPISODE HYPOMANIC: ICD-10-CM

## 2022-07-13 DIAGNOSIS — M25.561 CHRONIC PAIN OF RIGHT KNEE: ICD-10-CM

## 2022-07-13 DIAGNOSIS — G89.29 CHRONIC PAIN OF RIGHT KNEE: ICD-10-CM

## 2022-07-13 DIAGNOSIS — K21.9 GASTROESOPHAGEAL REFLUX DISEASE WITHOUT ESOPHAGITIS: ICD-10-CM

## 2022-07-13 DIAGNOSIS — E78.49 OTHER HYPERLIPIDEMIA: Primary | ICD-10-CM

## 2022-07-13 DIAGNOSIS — G35 MS (MULTIPLE SCLEROSIS): ICD-10-CM

## 2022-07-13 DIAGNOSIS — K76.0 FATTY LIVER DISEASE, NONALCOHOLIC: ICD-10-CM

## 2022-07-13 PROCEDURE — 99214 OFFICE O/P EST MOD 30 MIN: CPT | Performed by: INTERNAL MEDICINE

## 2022-07-13 NOTE — PROGRESS NOTES
Patient is a 37 y.o. male who is here for a follow up of hyperlipidemia and anxiety.  Chief Complaint   Patient presents with   • Hyperlipidemia   • Anxiety         HPI:    Here for mgmt of DJD and FLD.  Energy level is good.  No dizziness or lightheadedness.  Occasional HAs.  Knees are still bothersome.  Staying active.  No fever or chills.  Sleeping ok.  No falls.  Appetite is good.     History:     Patient Active Problem List   Diagnosis   • Anxiety   • Bipolar affective disorder (HCC)   • Depression   • Gastroesophageal reflux disease without esophagitis   • Knee pain   • Schizophrenia (HCC)   • Bilateral edema of lower extremity   • Abnormal EKG   • Fatty liver disease, nonalcoholic   • Other hyperlipidemia   • MS (multiple sclerosis) ([facility])       Past Medical History:   Diagnosis Date   • Abnormal ECG    • Orchitis    • Plantar fasciitis        Past Surgical History:   Procedure Laterality Date   • LEG SURGERY  05/03/2013    Right Knee       Current Outpatient Medications on File Prior to Visit   Medication Sig   • ABILIFY MAINTENA 400 MG Suspension Reconstituted ER IM injection ER    • aspirin (aspirin) 81 MG EC tablet Take 1 tablet by mouth Daily.   • benztropine (COGENTIN) 1 MG tablet Take 1 tablet by mouth 2 (two) times a day.   • diclofenac (CATAFLAM) 50 MG tablet TAKE 1 TABLET EVERY 8 HOURS AS NEEDED FOR PAIN   • Dimethyl Fumarate (TECFIDERA) capsule delayed-release Take 1 tablet by mouth 2 (Two) Times a Day.   • fluphenazine decanoate (PROLIXIN) 25 MG/ML injection    • hydrOXYzine (VISTARIL) 25 MG capsule 3 (Three) Times a Day As Needed.   • omeprazole (priLOSEC) 40 MG capsule Take 1 capsule by mouth Daily.   • ondansetron (ZOFRAN) 4 MG tablet Take 1 tablet by mouth Every 4 (Four) Hours As Needed for Nausea.   • triamterene-hydrochlorothiazide (MAXZIDE-25) 37.5-25 MG per tablet Take 1 tablet by mouth Daily As Needed (edema).   • Sod Picosulfate-Mag Ox-Cit Acd 10-3.5-12 MG-GM -GM/160ML solution Take 160  "mL by mouth Take As Directed for 2 doses.   • Sod Picosulfate-Mag Ox-Cit Acd 10-3.5-12 MG-GM-GM pack As directed     No current facility-administered medications on file prior to visit.       Family History   Problem Relation Age of Onset   • Diabetes Mother    • Hypertension Mother    • Atrial fibrillation Father    • Colon cancer Sister        Social History     Socioeconomic History   • Marital status: Single   Tobacco Use   • Smoking status: Never Smoker   • Smokeless tobacco: Never Used   Substance and Sexual Activity   • Alcohol use: Never   • Drug use: Never   • Sexual activity: Defer         Review of Systems   Constitutional: Positive for fatigue. Negative for chills, diaphoresis and fever.   HENT: Negative for congestion, ear pain, hearing loss, nosebleeds, postnasal drip, sinus pressure and sore throat.    Eyes: Positive for visual disturbance. Negative for pain, discharge and itching.   Respiratory: Negative for cough, chest tightness, shortness of breath and wheezing.    Cardiovascular: Negative for chest pain, palpitations and leg swelling.   Gastrointestinal: Negative for abdominal distention, abdominal pain, blood in stool, constipation, diarrhea, nausea and vomiting.        10/15 colonoscopy normal   Endocrine: Negative for polydipsia and polyuria.   Genitourinary: Negative for difficulty urinating, dysuria, frequency and hematuria.   Musculoskeletal: Positive for arthralgias. Negative for gait problem and myalgias.        Knee pain on R>L   Skin: Negative for rash and wound.   Neurological: Positive for weakness and headaches. Negative for dizziness.   Psychiatric/Behavioral: Positive for behavioral problems and decreased concentration. Negative for dysphoric mood.       /72 (BP Location: Left arm, Patient Position: Sitting)   Pulse 79   Temp 96.9 °F (36.1 °C) (Infrared)   Ht 170.2 cm (67.01\")   Wt 114 kg (252 lb)   SpO2 97%   BMI 39.46 kg/m²       Physical Exam  Constitutional:       " Appearance: Normal appearance. He is well-developed. He is obese.   HENT:      Head: Normocephalic and atraumatic.      Right Ear: External ear normal.      Left Ear: External ear normal.      Nose: Nose normal.      Mouth/Throat:      Mouth: Mucous membranes are moist.      Pharynx: Oropharynx is clear.   Eyes:      Extraocular Movements: Extraocular movements intact.      Conjunctiva/sclera: Conjunctivae normal.      Pupils: Pupils are equal, round, and reactive to light.   Cardiovascular:      Rate and Rhythm: Normal rate and regular rhythm.      Heart sounds: Normal heart sounds.   Pulmonary:      Effort: Pulmonary effort is normal.      Breath sounds: Normal breath sounds.   Abdominal:      General: Bowel sounds are normal.      Palpations: Abdomen is soft.   Musculoskeletal:         General: Normal range of motion.      Cervical back: Normal range of motion and neck supple.      Comments: Bilateral knee crepitus   Lymphadenopathy:      Cervical: No cervical adenopathy.   Skin:     General: Skin is warm and dry.   Neurological:      General: No focal deficit present.      Mental Status: He is alert and oriented to person, place, and time.   Psychiatric:         Mood and Affect: Mood normal.         Behavior: Behavior normal.         Thought Content: Thought content normal.         Procedure:      Discussion/Summary:    knee pain-diclofenac prn, stable  gerd-stable on PPI  bipolor/schizophrenia-/depresson-advised compliance with meds , controlled  constipation-colace bid, increase fiber, not an issue  Edema-cont maxzide prn, stable  Abnormal lft/?fld-work up noted, recheck improved, advised wt loss  FH of colon ca-advised colonoscopy, he wants to defer  Abnormal glucose- AIC at goal  MS-per [facility]      Prior Labs noted and dw patient, counseled on low carb/ncs  and low chol    Current Outpatient Medications:   •  ABILIFY MAINTENA 400 MG Suspension Reconstituted ER IM injection ER, , Disp: , Rfl:   •   aspirin (aspirin) 81 MG EC tablet, Take 1 tablet by mouth Daily., Disp: 30 tablet, Rfl: 11  •  benztropine (COGENTIN) 1 MG tablet, Take 1 tablet by mouth 2 (two) times a day., Disp: , Rfl:   •  diclofenac (CATAFLAM) 50 MG tablet, TAKE 1 TABLET EVERY 8 HOURS AS NEEDED FOR PAIN, Disp: , Rfl: 0  •  Dimethyl Fumarate (TECFIDERA) capsule delayed-release, Take 1 tablet by mouth 2 (Two) Times a Day., Disp: , Rfl:   •  fluphenazine decanoate (PROLIXIN) 25 MG/ML injection, , Disp: , Rfl:   •  hydrOXYzine (VISTARIL) 25 MG capsule, 3 (Three) Times a Day As Needed., Disp: , Rfl:   •  omeprazole (priLOSEC) 40 MG capsule, Take 1 capsule by mouth Daily., Disp: 30 capsule, Rfl: 2  •  ondansetron (ZOFRAN) 4 MG tablet, Take 1 tablet by mouth Every 4 (Four) Hours As Needed for Nausea., Disp: 30 tablet, Rfl: 0  •  triamterene-hydrochlorothiazide (MAXZIDE-25) 37.5-25 MG per tablet, Take 1 tablet by mouth Daily As Needed (edema)., Disp: 30 tablet, Rfl: 11  •  Sod Picosulfate-Mag Ox-Cit Acd 10-3.5-12 MG-GM -GM/160ML solution, Take 160 mL by mouth Take As Directed for 2 doses., Disp: 320 mL, Rfl: 0  •  Sod Picosulfate-Mag Ox-Cit Acd 10-3.5-12 MG-GM-GM pack, As directed, Disp: 2 each, Rfl: 0        Diagnoses and all orders for this visit:    1. Other hyperlipidemia (Primary)    2. Gastroesophageal reflux disease without esophagitis    3. Fatty liver disease, nonalcoholic    4. Bipolar affective disorder, current episode hypomanic (HCC)    5. Chronic pain of right knee    6. MS (multiple sclerosis) (HCC)

## 2022-10-11 ENCOUNTER — TELEPHONE (OUTPATIENT)
Dept: INTERNAL MEDICINE | Facility: CLINIC | Age: 37
End: 2022-10-11

## 2022-10-11 NOTE — TELEPHONE ENCOUNTER
Pt called the offie b/c hi sight went out on sundy, pt clled his neurologist who told them to call us. Spoke to the providers here and they believe it is best for pt to see his eye dr, which pt agreed to do.

## 2022-11-22 ENCOUNTER — OFFICE VISIT (OUTPATIENT)
Dept: INTERNAL MEDICINE | Facility: CLINIC | Age: 37
End: 2022-11-22

## 2022-11-22 VITALS
SYSTOLIC BLOOD PRESSURE: 120 MMHG | TEMPERATURE: 96.9 F | DIASTOLIC BLOOD PRESSURE: 62 MMHG | OXYGEN SATURATION: 98 % | WEIGHT: 252 LBS | HEIGHT: 67 IN | HEART RATE: 80 BPM | BODY MASS INDEX: 39.55 KG/M2

## 2022-11-22 DIAGNOSIS — G35 MS (MULTIPLE SCLEROSIS): ICD-10-CM

## 2022-11-22 DIAGNOSIS — K21.9 GASTROESOPHAGEAL REFLUX DISEASE WITHOUT ESOPHAGITIS: Primary | ICD-10-CM

## 2022-11-22 DIAGNOSIS — K76.0 FATTY LIVER DISEASE, NONALCOHOLIC: ICD-10-CM

## 2022-11-22 DIAGNOSIS — R73.09 ABNORMAL GLUCOSE: ICD-10-CM

## 2022-11-22 DIAGNOSIS — Z00.00 ROUTINE GENERAL MEDICAL EXAMINATION AT A HEALTH CARE FACILITY: ICD-10-CM

## 2022-11-22 DIAGNOSIS — F31.0 BIPOLAR AFFECTIVE DISORDER, CURRENT EPISODE HYPOMANIC: ICD-10-CM

## 2022-11-22 DIAGNOSIS — Z80.0 FAMILY HISTORY OF COLON CANCER: ICD-10-CM

## 2022-11-22 DIAGNOSIS — Z12.11 SCREEN FOR COLON CANCER: ICD-10-CM

## 2022-11-22 DIAGNOSIS — E78.49 OTHER HYPERLIPIDEMIA: ICD-10-CM

## 2022-11-22 PROCEDURE — 3008F BODY MASS INDEX DOCD: CPT | Performed by: INTERNAL MEDICINE

## 2022-11-22 PROCEDURE — 99395 PREV VISIT EST AGE 18-39: CPT | Performed by: INTERNAL MEDICINE

## 2022-11-22 PROCEDURE — 2014F MENTAL STATUS ASSESS: CPT | Performed by: INTERNAL MEDICINE

## 2022-11-22 RX ORDER — OFATUMUMAB 20 MG/.4ML
1 INJECTION, SOLUTION SUBCUTANEOUS
COMMUNITY
Start: 2022-11-18

## 2022-11-22 NOTE — PROGRESS NOTES
Patient is a 37 y.o. male who is here for a physical.  Chief Complaint   Patient presents with   • Annual Exam         HPI:    Here for CPE.    History:     Patient Active Problem List   Diagnosis   • Anxiety   • Bipolar affective disorder (HCC)   • Depression   • Gastroesophageal reflux disease without esophagitis   • Knee pain   • Schizophrenia (HCC)   • Bilateral edema of lower extremity   • Abnormal EKG   • Fatty liver disease, nonalcoholic   • Other hyperlipidemia   • MS (multiple sclerosis) (HCC)       Past Medical History:   Diagnosis Date   • Abnormal ECG    • Orchitis    • Plantar fasciitis        Past Surgical History:   Procedure Laterality Date   • LEG SURGERY  05/03/2013    Right Knee       Current Outpatient Medications on File Prior to Visit   Medication Sig   • ABILIFY MAINTENA 400 MG Suspension Reconstituted ER IM injection ER    • aspirin (aspirin) 81 MG EC tablet Take 1 tablet by mouth Daily.   • benztropine (COGENTIN) 1 MG tablet Take 1 tablet by mouth 2 (two) times a day.   • diclofenac (CATAFLAM) 50 MG tablet TAKE 1 TABLET EVERY 8 HOURS AS NEEDED FOR PAIN   • Dimethyl Fumarate (TECFIDERA) capsule delayed-release Take 1 tablet by mouth 2 (Two) Times a Day.   • hydrOXYzine (VISTARIL) 25 MG capsule 3 (Three) Times a Day As Needed.   • Ofatumumab (Kesimpta) 20 MG/0.4ML solution auto-injector Inject 1 pen under the skin into the appropriate area as directed Every 30 (Thirty) Days.   • omeprazole (priLOSEC) 40 MG capsule Take 1 capsule by mouth Daily.   • ondansetron (ZOFRAN) 4 MG tablet Take 1 tablet by mouth Every 4 (Four) Hours As Needed for Nausea.   • triamterene-hydrochlorothiazide (MAXZIDE-25) 37.5-25 MG per tablet Take 1 tablet by mouth Daily As Needed (edema).   • Sod Picosulfate-Mag Ox-Cit Acd 10-3.5-12 MG-GM-GM pack As directed   • [DISCONTINUED] fluphenazine decanoate (PROLIXIN) 25 MG/ML injection    • [DISCONTINUED] Sod Picosulfate-Mag Ox-Cit Acd 10-3.5-12 MG-GM -GM/160ML solution Take  "160 mL by mouth Take As Directed for 2 doses.     No current facility-administered medications on file prior to visit.       Family History   Problem Relation Age of Onset   • Diabetes Mother    • Hypertension Mother    • Atrial fibrillation Father    • Colon cancer Sister        Social History     Socioeconomic History   • Marital status: Single   Tobacco Use   • Smoking status: Never   • Smokeless tobacco: Never   Substance and Sexual Activity   • Alcohol use: Never   • Drug use: Never   • Sexual activity: Defer         Review of Systems   Constitutional: Positive for fatigue. Negative for chills, diaphoresis and fever.   HENT: Negative for congestion, ear pain, hearing loss, nosebleeds, postnasal drip, sinus pressure and sore throat.    Eyes: Positive for visual disturbance. Negative for pain, discharge and itching.   Respiratory: Negative for cough, chest tightness, shortness of breath and wheezing.    Cardiovascular: Negative for chest pain, palpitations and leg swelling.   Gastrointestinal: Negative for abdominal distention, abdominal pain, blood in stool, constipation, diarrhea, nausea and vomiting.        10/15 colonoscopy normal   Endocrine: Negative for polydipsia and polyuria.   Genitourinary: Negative for difficulty urinating, dysuria, frequency and hematuria.   Musculoskeletal: Positive for arthralgias. Negative for gait problem and myalgias.        Knee pain on R>L   Skin: Negative for rash and wound.   Neurological: Positive for weakness and headaches. Negative for dizziness.   Psychiatric/Behavioral: Positive for behavioral problems and decreased concentration. Negative for dysphoric mood.       /62 (BP Location: Left arm, Patient Position: Sitting)   Pulse 80   Temp 96.9 °F (36.1 °C) (Infrared)   Ht 170.2 cm (67.01\")   Wt 114 kg (252 lb)   SpO2 98%   BMI 39.46 kg/m²       Physical Exam  Constitutional:       Appearance: Normal appearance. He is well-developed. He is obese.   HENT:      " Head: Normocephalic and atraumatic.      Right Ear: External ear normal.      Left Ear: External ear normal.      Nose: Nose normal.      Mouth/Throat:      Mouth: Mucous membranes are moist.      Pharynx: Oropharynx is clear.   Eyes:      Extraocular Movements: Extraocular movements intact.      Conjunctiva/sclera: Conjunctivae normal.      Pupils: Pupils are equal, round, and reactive to light.   Cardiovascular:      Rate and Rhythm: Normal rate and regular rhythm.      Heart sounds: Normal heart sounds.   Pulmonary:      Effort: Pulmonary effort is normal.      Breath sounds: Normal breath sounds.   Abdominal:      General: Bowel sounds are normal.      Palpations: Abdomen is soft.   Genitourinary:     Penis: Normal.       Testes: Normal.   Musculoskeletal:         General: Normal range of motion.      Cervical back: Normal range of motion and neck supple.      Comments: Bilateral knee crepitus   Lymphadenopathy:      Cervical: No cervical adenopathy.   Skin:     General: Skin is warm and dry.   Neurological:      General: No focal deficit present.      Mental Status: He is alert and oriented to person, place, and time.   Psychiatric:         Mood and Affect: Mood normal.         Behavior: Behavior normal.         Thought Content: Thought content normal.         Procedure:      Discussion/Summary:        HME-counseled on diet and exercise, fasting labs on rtc  knee pain-diclofenac prn, stable  gerd-stable on PPI  bipolor/schizophrenia-/depresson-advised compliance with meds , controlled  constipation-colace bid, increase fiber, not an issue  Edema-cont maxzide prn, stable  Abnormal lft/?fld-work up noted, recheck improved, advised wt loss  FH of colon ca-advised colonoscopy  Abnormal glucose- AIC at goal  MS-per [facility]      Prior Labs noted and dw patient, counseled on low carb/ncs  and low chol    Reviewed the following with the patient: advised patient to avoid alcoholic beverages, encouraged patient to  exercise 5-7 days per week for 30 minutes at a time, ideal body weight discussed with patient and weight loss encouraged.      Current Outpatient Medications:   •  ABILIFY MAINTENA 400 MG Suspension Reconstituted ER IM injection ER, , Disp: , Rfl:   •  aspirin (aspirin) 81 MG EC tablet, Take 1 tablet by mouth Daily., Disp: 30 tablet, Rfl: 11  •  benztropine (COGENTIN) 1 MG tablet, Take 1 tablet by mouth 2 (two) times a day., Disp: , Rfl:   •  diclofenac (CATAFLAM) 50 MG tablet, TAKE 1 TABLET EVERY 8 HOURS AS NEEDED FOR PAIN, Disp: , Rfl: 0  •  Dimethyl Fumarate (TECFIDERA) capsule delayed-release, Take 1 tablet by mouth 2 (Two) Times a Day., Disp: , Rfl:   •  hydrOXYzine (VISTARIL) 25 MG capsule, 3 (Three) Times a Day As Needed., Disp: , Rfl:   •  Ofatumumab (Kesimpta) 20 MG/0.4ML solution auto-injector, Inject 1 pen under the skin into the appropriate area as directed Every 30 (Thirty) Days., Disp: , Rfl:   •  omeprazole (priLOSEC) 40 MG capsule, Take 1 capsule by mouth Daily., Disp: 30 capsule, Rfl: 2  •  ondansetron (ZOFRAN) 4 MG tablet, Take 1 tablet by mouth Every 4 (Four) Hours As Needed for Nausea., Disp: 30 tablet, Rfl: 0  •  triamterene-hydrochlorothiazide (MAXZIDE-25) 37.5-25 MG per tablet, Take 1 tablet by mouth Daily As Needed (edema)., Disp: 30 tablet, Rfl: 11  •  Sod Picosulfate-Mag Ox-Cit Acd 10-3.5-12 MG-GM-GM pack, As directed, Disp: 2 each, Rfl: 0        Diagnoses and all orders for this visit:    1. Gastroesophageal reflux disease without esophagitis (Primary)    2. Other hyperlipidemia  -     Lipid Panel; Future    3. Fatty liver disease, nonalcoholic    4. Bipolar affective disorder, current episode hypomanic (HCC)    5. MS (multiple sclerosis) (HCC)    6. Routine general medical examination at a health care facility  -     CBC (No Diff); Future  -     Comprehensive Metabolic Panel; Future  -     Lipid Panel; Future  -     TSH; Future  -     Vitamin B12; Future  -     Hemoglobin A1c;  Future    7. Screen for colon cancer  -     Ambulatory Referral to Gastroenterology    8. Family history of colon cancer  -     Ambulatory Referral to Gastroenterology    9. Abnormal glucose  -     Hemoglobin A1c; Future

## 2022-12-12 ENCOUNTER — TELEPHONE (OUTPATIENT)
Dept: INTERNAL MEDICINE | Facility: CLINIC | Age: 37
End: 2022-12-12

## 2022-12-12 RX ORDER — SILDENAFIL 50 MG/1
50 TABLET, FILM COATED ORAL DAILY PRN
Qty: 15 TABLET | Refills: 2 | Status: SHIPPED | OUTPATIENT
Start: 2022-12-12

## 2022-12-12 NOTE — TELEPHONE ENCOUNTER
Caller: Geremias Serrano    Relationship: Self    Best call back number: [phone]    What is the best time to reach you: ANYTIME     Who are you requesting to speak with (clinical staff, provider,  specific staff member):CLINCIAL STAFF     What was the call regarding: PATIENT IS WANTING TO SPEAK WITH THE CLINICAL STAFF ABOUT HIS CURRENT SYMPTOMS. HE SAYS THAT HE WOULD LIKE TO SEE ABOUT GETTING A MEDICATION.     Do you require a callback: YES

## 2023-02-22 DIAGNOSIS — Z12.11 SCREENING FOR COLORECTAL CANCER: Primary | ICD-10-CM

## 2023-02-22 DIAGNOSIS — Z12.12 SCREENING FOR COLORECTAL CANCER: Primary | ICD-10-CM

## 2023-05-12 ENCOUNTER — TELEPHONE (OUTPATIENT)
Dept: INTERNAL MEDICINE | Facility: CLINIC | Age: 38
End: 2023-05-12
Payer: MEDICAID

## 2023-05-12 NOTE — TELEPHONE ENCOUNTER
Pt had a missed call from the office, I think it was to r/s but  shad nothing in may and the pt only wanted to see him , please advise.

## 2023-05-19 ENCOUNTER — OFFICE VISIT (OUTPATIENT)
Dept: INTERNAL MEDICINE | Facility: CLINIC | Age: 38
End: 2023-05-19
Payer: MEDICAID

## 2023-05-19 ENCOUNTER — LAB (OUTPATIENT)
Dept: LAB | Facility: HOSPITAL | Age: 38
End: 2023-05-19
Payer: MEDICAID

## 2023-05-19 VITALS
BODY MASS INDEX: 41.75 KG/M2 | SYSTOLIC BLOOD PRESSURE: 120 MMHG | DIASTOLIC BLOOD PRESSURE: 80 MMHG | HEIGHT: 67 IN | HEART RATE: 82 BPM | OXYGEN SATURATION: 98 % | WEIGHT: 266 LBS

## 2023-05-19 DIAGNOSIS — F31.0 BIPOLAR AFFECTIVE DISORDER, CURRENT EPISODE HYPOMANIC: ICD-10-CM

## 2023-05-19 DIAGNOSIS — E66.01 CLASS 3 SEVERE OBESITY DUE TO EXCESS CALORIES WITH SERIOUS COMORBIDITY AND BODY MASS INDEX (BMI) OF 40.0 TO 44.9 IN ADULT: ICD-10-CM

## 2023-05-19 DIAGNOSIS — K76.0 FATTY LIVER DISEASE, NONALCOHOLIC: ICD-10-CM

## 2023-05-19 DIAGNOSIS — E78.49 OTHER HYPERLIPIDEMIA: Primary | ICD-10-CM

## 2023-05-19 DIAGNOSIS — Z00.00 ROUTINE GENERAL MEDICAL EXAMINATION AT A HEALTH CARE FACILITY: ICD-10-CM

## 2023-05-19 DIAGNOSIS — R60.0 BILATERAL EDEMA OF LOWER EXTREMITY: ICD-10-CM

## 2023-05-19 DIAGNOSIS — R73.09 ABNORMAL GLUCOSE: ICD-10-CM

## 2023-05-19 DIAGNOSIS — K21.9 GASTROESOPHAGEAL REFLUX DISEASE WITHOUT ESOPHAGITIS: ICD-10-CM

## 2023-05-19 DIAGNOSIS — G35 MS (MULTIPLE SCLEROSIS): ICD-10-CM

## 2023-05-19 LAB
ALBUMIN SERPL-MCNC: 4.5 G/DL (ref 3.5–5.2)
ALBUMIN/GLOB SERPL: 1.6 G/DL
ALP SERPL-CCNC: 86 U/L (ref 39–117)
ALT SERPL W P-5'-P-CCNC: 65 U/L (ref 1–41)
ANION GAP SERPL CALCULATED.3IONS-SCNC: 10 MMOL/L (ref 5–15)
AST SERPL-CCNC: 40 U/L (ref 1–40)
BILIRUB SERPL-MCNC: 0.5 MG/DL (ref 0–1.2)
BUN SERPL-MCNC: 16 MG/DL (ref 6–20)
BUN/CREAT SERPL: 16 (ref 7–25)
CALCIUM SPEC-SCNC: 9.7 MG/DL (ref 8.6–10.5)
CHLORIDE SERPL-SCNC: 101 MMOL/L (ref 98–107)
CO2 SERPL-SCNC: 26 MMOL/L (ref 22–29)
CREAT SERPL-MCNC: 1 MG/DL (ref 0.76–1.27)
DEPRECATED RDW RBC AUTO: 40.3 FL (ref 37–54)
EGFRCR SERPLBLD CKD-EPI 2021: 97.6 ML/MIN/1.73
ERYTHROCYTE [DISTWIDTH] IN BLOOD BY AUTOMATED COUNT: 13.1 % (ref 12.3–15.4)
GLOBULIN UR ELPH-MCNC: 2.8 GM/DL
GLUCOSE SERPL-MCNC: 83 MG/DL (ref 65–99)
HBA1C MFR BLD: 5.8 % (ref 4.8–5.6)
HCT VFR BLD AUTO: 44 % (ref 37.5–51)
HGB BLD-MCNC: 14.9 G/DL (ref 13–17.7)
MCH RBC QN AUTO: 28.4 PG (ref 26.6–33)
MCHC RBC AUTO-ENTMCNC: 33.9 G/DL (ref 31.5–35.7)
MCV RBC AUTO: 83.8 FL (ref 79–97)
PLATELET # BLD AUTO: 312 10*3/MM3 (ref 140–450)
PMV BLD AUTO: 9.7 FL (ref 6–12)
POTASSIUM SERPL-SCNC: 3.8 MMOL/L (ref 3.5–5.2)
PROT SERPL-MCNC: 7.3 G/DL (ref 6–8.5)
RBC # BLD AUTO: 5.25 10*6/MM3 (ref 4.14–5.8)
SODIUM SERPL-SCNC: 137 MMOL/L (ref 136–145)
TSH SERPL DL<=0.05 MIU/L-ACNC: 4.33 UIU/ML (ref 0.27–4.2)
VIT B12 BLD-MCNC: 398 PG/ML (ref 211–946)
WBC NRBC COR # BLD: 9.99 10*3/MM3 (ref 3.4–10.8)

## 2023-05-19 PROCEDURE — 1160F RVW MEDS BY RX/DR IN RCRD: CPT | Performed by: INTERNAL MEDICINE

## 2023-05-19 PROCEDURE — 82607 VITAMIN B-12: CPT

## 2023-05-19 PROCEDURE — 80050 GENERAL HEALTH PANEL: CPT | Performed by: INTERNAL MEDICINE

## 2023-05-19 PROCEDURE — 83036 HEMOGLOBIN GLYCOSYLATED A1C: CPT | Performed by: INTERNAL MEDICINE

## 2023-05-19 PROCEDURE — 1159F MED LIST DOCD IN RCRD: CPT | Performed by: INTERNAL MEDICINE

## 2023-05-19 NOTE — PROGRESS NOTES
Patient is a 38 y.o. male who is here for a follow up of hyperlipidemia.  Chief Complaint   Patient presents with   • Hyperlipidemia         HPI:    Here for mgmt of GERD and elevated lfts.  Having difficulty loosing weight despite diet and exercise.  GERD is good.  Feels depressed over his weight.  No nausea or emesis.  Some SOB.  Sleeping well.     History:     Patient Active Problem List   Diagnosis   • Anxiety   • Bipolar affective disorder   • Depression   • Gastroesophageal reflux disease without esophagitis   • Knee pain   • Schizophrenia   • Bilateral edema of lower extremity   • Abnormal EKG   • Fatty liver disease, nonalcoholic   • Other hyperlipidemia   • MS (multiple sclerosis)   • Morbid (severe) obesity due to excess calories       Past Medical History:   Diagnosis Date   • Abnormal ECG    • Orchitis    • Plantar fasciitis        Past Surgical History:   Procedure Laterality Date   • LEG SURGERY  05/03/2013    Right Knee       Current Outpatient Medications on File Prior to Visit   Medication Sig   • ABILIFY MAINTENA 400 MG Suspension Reconstituted ER IM injection ER    • benztropine (COGENTIN) 1 MG tablet Take 1 tablet by mouth 2 (Two) Times a Day.   • diclofenac (CATAFLAM) 50 MG tablet TAKE 1 TABLET EVERY 8 HOURS AS NEEDED FOR PAIN   • Dimethyl Fumarate (TECFIDERA) capsule delayed-release Take 1 capsule by mouth 2 (Two) Times a Day.   • hydrOXYzine (VISTARIL) 25 MG capsule 3 (Three) Times a Day As Needed.   • Ofatumumab (Kesimpta) 20 MG/0.4ML solution auto-injector Inject 1 pen under the skin into the appropriate area as directed Every 30 (Thirty) Days.   • omeprazole (priLOSEC) 40 MG capsule Take 1 capsule by mouth Daily.   • ondansetron (ZOFRAN) 4 MG tablet Take 1 tablet by mouth Every 4 (Four) Hours As Needed for Nausea.   • sildenafil (Viagra) 50 MG tablet Take 1 tablet by mouth Daily As Needed for Erectile Dysfunction.   • triamterene-hydrochlorothiazide (MAXZIDE-25) 37.5-25 MG per tablet Take 1  "tablet by mouth Daily As Needed (edema).     No current facility-administered medications on file prior to visit.       Family History   Problem Relation Age of Onset   • Diabetes Mother    • Hypertension Mother    • Atrial fibrillation Father    • Colon cancer Sister        Social History     Socioeconomic History   • Marital status: Single   Tobacco Use   • Smoking status: Never   • Smokeless tobacco: Never   Substance and Sexual Activity   • Alcohol use: Never   • Drug use: Never   • Sexual activity: Defer         Review of Systems   Constitutional: Positive for fatigue. Negative for chills, diaphoresis and fever.   HENT: Negative for congestion, ear pain, hearing loss, nosebleeds, postnasal drip, sinus pressure and sore throat.    Eyes: Positive for visual disturbance (better). Negative for pain, discharge and itching.   Respiratory: Negative for cough, chest tightness, shortness of breath and wheezing.    Cardiovascular: Negative for chest pain, palpitations and leg swelling.   Gastrointestinal: Negative for abdominal distention, abdominal pain, blood in stool, constipation, diarrhea, nausea and vomiting.        10/15 colonoscopy normal   Endocrine: Negative for polydipsia and polyuria.   Genitourinary: Negative for difficulty urinating, dysuria, frequency and hematuria.   Musculoskeletal: Positive for arthralgias. Negative for gait problem and myalgias.        Knee pain on R>L   Skin: Negative for rash and wound.   Neurological: Positive for weakness and headaches. Negative for dizziness.   Psychiatric/Behavioral: Positive for behavioral problems, decreased concentration and dysphoric mood.       /80   Pulse 82   Ht 170.2 cm (67.01\")   Wt 121 kg (266 lb)   SpO2 98%   BMI 41.65 kg/m²       Physical Exam  Constitutional:       Appearance: Normal appearance. He is well-developed. He is obese.   HENT:      Head: Normocephalic and atraumatic.      Right Ear: External ear normal.      Left Ear: External " ear normal.      Nose: Nose normal.      Mouth/Throat:      Mouth: Mucous membranes are moist.      Pharynx: Oropharynx is clear.   Eyes:      Extraocular Movements: Extraocular movements intact.      Conjunctiva/sclera: Conjunctivae normal.      Pupils: Pupils are equal, round, and reactive to light.   Cardiovascular:      Rate and Rhythm: Normal rate and regular rhythm.      Heart sounds: Normal heart sounds.   Pulmonary:      Effort: Pulmonary effort is normal.      Breath sounds: Normal breath sounds.   Abdominal:      General: Bowel sounds are normal.      Palpations: Abdomen is soft.   Genitourinary:     Penis: Normal.       Testes: Normal.   Musculoskeletal:         General: Normal range of motion.      Cervical back: Normal range of motion and neck supple.      Comments: Bilateral knee crepitus   Lymphadenopathy:      Cervical: No cervical adenopathy.   Skin:     General: Skin is warm and dry.   Neurological:      General: No focal deficit present.      Mental Status: He is alert and oriented to person, place, and time.   Psychiatric:         Mood and Affect: Mood normal.         Behavior: Behavior normal.         Thought Content: Thought content normal.         Procedure:      Discussion/Summary:    knee pain-diclofenac prn, stable  gerd-stable on PPI  bipolor/schizophrenia-/depresson-advised compliance with meds , controlled  constipation-colace bid, increase fiber, not an issue  Edema-cont maxzide prn, stable  Abnormal lft/?fld-work up noted, recheck, advised wt loss  FH of colon ca-advised colonoscopy  Abnormal glucose- AIC today noted  MS-per [facility]  Obesity-Wegovy, advised of risk and benefits and he wishes to proceed      5/19 Labs noted and dw patient, counseled on low carb/ncs  and low chol    Current Outpatient Medications:   •  ABILIFY MAINTENA 400 MG Suspension Reconstituted ER IM injection ER, , Disp: , Rfl:   •  benztropine (COGENTIN) 1 MG tablet, Take 1 tablet by mouth 2 (Two) Times a  Day., Disp: , Rfl:   •  diclofenac (CATAFLAM) 50 MG tablet, TAKE 1 TABLET EVERY 8 HOURS AS NEEDED FOR PAIN, Disp: , Rfl: 0  •  Dimethyl Fumarate (TECFIDERA) capsule delayed-release, Take 1 capsule by mouth 2 (Two) Times a Day., Disp: , Rfl:   •  hydrOXYzine (VISTARIL) 25 MG capsule, 3 (Three) Times a Day As Needed., Disp: , Rfl:   •  Ofatumumab (Kesimpta) 20 MG/0.4ML solution auto-injector, Inject 1 pen under the skin into the appropriate area as directed Every 30 (Thirty) Days., Disp: , Rfl:   •  omeprazole (priLOSEC) 40 MG capsule, Take 1 capsule by mouth Daily., Disp: 30 capsule, Rfl: 2  •  ondansetron (ZOFRAN) 4 MG tablet, Take 1 tablet by mouth Every 4 (Four) Hours As Needed for Nausea., Disp: 30 tablet, Rfl: 0  •  sildenafil (Viagra) 50 MG tablet, Take 1 tablet by mouth Daily As Needed for Erectile Dysfunction., Disp: 15 tablet, Rfl: 2  •  triamterene-hydrochlorothiazide (MAXZIDE-25) 37.5-25 MG per tablet, Take 1 tablet by mouth Daily As Needed (edema)., Disp: 30 tablet, Rfl: 11  •  Semaglutide-Weight Management (Wegovy) 0.25 MG/0.5ML solution auto-injector, Inject 0.25 mg under the skin into the appropriate area as directed Every 7 (Seven) Days., Disp: 2 mL, Rfl: 2        Diagnoses and all orders for this visit:    1. Other hyperlipidemia (Primary)  -     TSH    2. Gastroesophageal reflux disease without esophagitis  -     CBC (No Diff)  -     Comprehensive Metabolic Panel    3. Fatty liver disease, nonalcoholic    4. Bipolar affective disorder, current episode hypomanic    5. MS (multiple sclerosis)    6. Bilateral edema of lower extremity    7. Abnormal glucose  -     Hemoglobin A1c    8. Class 3 severe obesity due to excess calories with serious comorbidity and body mass index (BMI) of 40.0 to 44.9 in adult  -     Semaglutide-Weight Management (Wegovy) 0.25 MG/0.5ML solution auto-injector; Inject 0.25 mg under the skin into the appropriate area as directed Every 7 (Seven) Days.  Dispense: 2 mL; Refill:  2

## 2023-05-20 RX ORDER — SEMAGLUTIDE 0.25 MG/.5ML
0.25 INJECTION, SOLUTION SUBCUTANEOUS
Qty: 2 ML | Refills: 2 | Status: SHIPPED | OUTPATIENT
Start: 2023-05-20

## 2023-05-22 ENCOUNTER — TELEPHONE (OUTPATIENT)
Dept: INTERNAL MEDICINE | Facility: CLINIC | Age: 38
End: 2023-05-22

## 2023-05-22 NOTE — TELEPHONE ENCOUNTER
PATIENT WOULD LIKE TO KNOW STATUS OF WEGOVY.  HE HASN'T HEARD ANYTHING.     PLEASE CALL [phone]

## 2023-07-25 ENCOUNTER — TELEPHONE (OUTPATIENT)
Dept: GASTROENTEROLOGY | Facility: CLINIC | Age: 38
End: 2023-07-25

## 2023-07-25 ENCOUNTER — LAB (OUTPATIENT)
Dept: INTERNAL MEDICINE | Facility: CLINIC | Age: 38
End: 2023-07-25
Payer: MEDICAID

## 2023-07-25 ENCOUNTER — OFFICE VISIT (OUTPATIENT)
Dept: INTERNAL MEDICINE | Facility: CLINIC | Age: 38
End: 2023-07-25
Payer: MEDICAID

## 2023-07-25 VITALS
BODY MASS INDEX: 37.66 KG/M2 | OXYGEN SATURATION: 98 % | HEART RATE: 76 BPM | WEIGHT: 269 LBS | SYSTOLIC BLOOD PRESSURE: 120 MMHG | HEIGHT: 71 IN | DIASTOLIC BLOOD PRESSURE: 72 MMHG

## 2023-07-25 DIAGNOSIS — K21.9 GASTROESOPHAGEAL REFLUX DISEASE WITHOUT ESOPHAGITIS: Primary | ICD-10-CM

## 2023-07-25 DIAGNOSIS — F31.0 BIPOLAR AFFECTIVE DISORDER, CURRENT EPISODE HYPOMANIC: ICD-10-CM

## 2023-07-25 DIAGNOSIS — E66.01 MORBID (SEVERE) OBESITY DUE TO EXCESS CALORIES: ICD-10-CM

## 2023-07-25 DIAGNOSIS — K62.5 RECTAL BLEEDING: Primary | ICD-10-CM

## 2023-07-25 DIAGNOSIS — K62.5 RECTAL BLEEDING: ICD-10-CM

## 2023-07-25 PROBLEM — E78.2 MIXED HYPERLIPIDEMIA: Status: ACTIVE | Noted: 2021-11-10

## 2023-07-25 LAB
DEPRECATED RDW RBC AUTO: 39.8 FL (ref 37–54)
ERYTHROCYTE [DISTWIDTH] IN BLOOD BY AUTOMATED COUNT: 13 % (ref 12.3–15.4)
HCT VFR BLD AUTO: 42.2 % (ref 37.5–51)
HGB BLD-MCNC: 14.2 G/DL (ref 13–17.7)
MCH RBC QN AUTO: 28.5 PG (ref 26.6–33)
MCHC RBC AUTO-ENTMCNC: 33.6 G/DL (ref 31.5–35.7)
MCV RBC AUTO: 84.7 FL (ref 79–97)
PLATELET # BLD AUTO: 308 10*3/MM3 (ref 140–450)
PMV BLD AUTO: 9.8 FL (ref 6–12)
RBC # BLD AUTO: 4.98 10*6/MM3 (ref 4.14–5.8)
WBC NRBC COR # BLD: 9.71 10*3/MM3 (ref 3.4–10.8)

## 2023-07-25 PROCEDURE — 36415 COLL VENOUS BLD VENIPUNCTURE: CPT | Performed by: INTERNAL MEDICINE

## 2023-07-25 PROCEDURE — 1160F RVW MEDS BY RX/DR IN RCRD: CPT | Performed by: INTERNAL MEDICINE

## 2023-07-25 PROCEDURE — 83540 ASSAY OF IRON: CPT | Performed by: INTERNAL MEDICINE

## 2023-07-25 PROCEDURE — 99214 OFFICE O/P EST MOD 30 MIN: CPT | Performed by: INTERNAL MEDICINE

## 2023-07-25 PROCEDURE — 80050 GENERAL HEALTH PANEL: CPT | Performed by: INTERNAL MEDICINE

## 2023-07-25 PROCEDURE — 1159F MED LIST DOCD IN RCRD: CPT | Performed by: INTERNAL MEDICINE

## 2023-07-25 NOTE — TELEPHONE ENCOUNTER
Caller: APRIL W/ URGENT CARE    Best call back number: [phone]    Patient is needing: URGENT CARE RERRED PT TO GASTRO FOR RECTAL BLEEDING AND HX OF COLON CANCER. URGENT CRITERIA FOR SCHEDULING. CALL BACK PT [phone] TO SCHEDULE ASAP.

## 2023-07-25 NOTE — PROGRESS NOTES
Patient is a 38 y.o. male who is here for a follow up of hyperlipidemia and anxiety  Chief Complaint   Patient presents with    Hyperlipidemia    Anxiety         HPI:    Here for evaluation of rectal bleeding.  Has had recent constipation.  Last colonoscopy in 2015 that was normal.  Occasional abdominal pain.    His BP has been elevated.  No dizziness or lightheadedness.      History:     Patient Active Problem List   Diagnosis    Anxiety    Bipolar affective disorder    Depression    Gastroesophageal reflux disease without esophagitis    Knee pain    Schizophrenia    Bilateral edema of lower extremity    Abnormal EKG    Fatty liver disease, nonalcoholic    Mixed hyperlipidemia    MS (multiple sclerosis)    Morbid (severe) obesity due to excess calories    Rectal bleeding       Past Medical History:   Diagnosis Date    Abnormal ECG     MS (multiple sclerosis)     Orchitis     Plantar fasciitis        Past Surgical History:   Procedure Laterality Date    LEG SURGERY  05/03/2013    Right Knee       Current Outpatient Medications on File Prior to Visit   Medication Sig    ABILIFY MAINTENA 400 MG Suspension Reconstituted ER IM injection ER     benztropine (COGENTIN) 1 MG tablet Take 1 tablet by mouth 2 (Two) Times a Day.    diclofenac (CATAFLAM) 50 MG tablet TAKE 1 TABLET EVERY 8 HOURS AS NEEDED FOR PAIN    Dimethyl Fumarate (TECFIDERA) capsule delayed-release Take 1 capsule by mouth 2 (Two) Times a Day.    hydrOXYzine (VISTARIL) 25 MG capsule 3 (Three) Times a Day As Needed.    Ofatumumab (Kesimpta) 20 MG/0.4ML solution auto-injector Inject 1 pen under the skin into the appropriate area as directed Every 30 (Thirty) Days.    omeprazole (priLOSEC) 40 MG capsule Take 1 capsule by mouth Daily.    ondansetron (ZOFRAN) 4 MG tablet Take 1 tablet by mouth Every 4 (Four) Hours As Needed for Nausea.    sildenafil (Viagra) 50 MG tablet Take 1 tablet by mouth Daily As Needed for Erectile Dysfunction.     triamterene-hydrochlorothiazide (MAXZIDE-25) 37.5-25 MG per tablet Take 1 tablet by mouth Daily As Needed (edema).    Semaglutide-Weight Management (Wegovy) 0.25 MG/0.5ML solution auto-injector Inject 0.25 mg under the skin into the appropriate area as directed Every 7 (Seven) Days. (Patient not taking: Reported on 7/25/2023)     No current facility-administered medications on file prior to visit.       Family History   Problem Relation Age of Onset    Diabetes Mother     Hypertension Mother     Atrial fibrillation Father     Colon cancer Sister        Social History     Socioeconomic History    Marital status: Single   Tobacco Use    Smoking status: Never    Smokeless tobacco: Never   Substance and Sexual Activity    Alcohol use: Never    Drug use: Never    Sexual activity: Defer         Review of Systems   Constitutional:  Positive for fatigue. Negative for chills, diaphoresis and fever.   HENT:  Negative for congestion, ear pain, hearing loss, nosebleeds, postnasal drip, sinus pressure and sore throat.    Eyes:  Positive for visual disturbance (better). Negative for pain, discharge and itching.   Respiratory:  Negative for cough, chest tightness, shortness of breath and wheezing.    Cardiovascular:  Negative for chest pain, palpitations and leg swelling.   Gastrointestinal:  Positive for blood in stool and constipation. Negative for abdominal distention, abdominal pain, diarrhea, nausea and vomiting.        10/15 colonoscopy normal   Endocrine: Negative for polydipsia and polyuria.   Genitourinary:  Negative for difficulty urinating, dysuria, frequency and hematuria.   Musculoskeletal:  Positive for arthralgias. Negative for gait problem and myalgias.        Knee pain on R>L   Skin:  Negative for rash and wound.   Neurological:  Positive for weakness and headaches. Negative for dizziness.   Psychiatric/Behavioral:  Positive for behavioral problems, decreased concentration and dysphoric mood.      /72    "Pulse 76   Ht 180.3 cm (70.98\")   Wt 122 kg (269 lb)   SpO2 98%   BMI 37.53 kg/m²       Physical Exam  Constitutional:       Appearance: Normal appearance. He is well-developed. He is obese.   HENT:      Head: Normocephalic and atraumatic.      Right Ear: External ear normal.      Left Ear: External ear normal.      Nose: Nose normal.      Mouth/Throat:      Mouth: Mucous membranes are moist.      Pharynx: Oropharynx is clear.   Eyes:      Extraocular Movements: Extraocular movements intact.      Conjunctiva/sclera: Conjunctivae normal.      Pupils: Pupils are equal, round, and reactive to light.   Cardiovascular:      Rate and Rhythm: Normal rate and regular rhythm.      Heart sounds: Normal heart sounds.   Pulmonary:      Effort: Pulmonary effort is normal.      Breath sounds: Normal breath sounds.   Abdominal:      General: Bowel sounds are normal.      Palpations: Abdomen is soft.   Genitourinary:     Penis: Normal.       Testes: Normal.   Musculoskeletal:         General: Normal range of motion.      Cervical back: Normal range of motion and neck supple.      Comments: Bilateral knee crepitus   Lymphadenopathy:      Cervical: No cervical adenopathy.   Skin:     General: Skin is warm and dry.   Neurological:      General: No focal deficit present.      Mental Status: He is alert and oriented to person, place, and time.   Psychiatric:         Mood and Affect: Mood normal.         Behavior: Behavior normal.         Thought Content: Thought content normal.       Procedure:      Discussion/Summary:    knee pain-diclofenac prn, stable  gerd-stable on PPI  bipolor/schizophrenia-/depresson-advised compliance with meds , controlled  constipation-colace bid, increase fiber, not an issue  Edema-cont maxzide prn, stable  Abnormal lft/?fld-work up noted, recheck, advised wt loss  FH of colon ca/rectal bleeding-will txt GI for colonoscopy soon  Abnormal glucose- AIC noted  MS-per [facility]  Obesity-Wegovy, advised of " risk and benefits and he wishes to proceed        7/26 Labs noted and dw patient, counseled on low carb/ncs  and low chol    Current Outpatient Medications:     ABILIFY MAINTENA 400 MG Suspension Reconstituted ER IM injection ER, , Disp: , Rfl:     benztropine (COGENTIN) 1 MG tablet, Take 1 tablet by mouth 2 (Two) Times a Day., Disp: , Rfl:     diclofenac (CATAFLAM) 50 MG tablet, TAKE 1 TABLET EVERY 8 HOURS AS NEEDED FOR PAIN, Disp: , Rfl: 0    Dimethyl Fumarate (TECFIDERA) capsule delayed-release, Take 1 capsule by mouth 2 (Two) Times a Day., Disp: , Rfl:     hydrOXYzine (VISTARIL) 25 MG capsule, 3 (Three) Times a Day As Needed., Disp: , Rfl:     Ofatumumab (Kesimpta) 20 MG/0.4ML solution auto-injector, Inject 1 pen under the skin into the appropriate area as directed Every 30 (Thirty) Days., Disp: , Rfl:     omeprazole (priLOSEC) 40 MG capsule, Take 1 capsule by mouth Daily., Disp: 30 capsule, Rfl: 2    ondansetron (ZOFRAN) 4 MG tablet, Take 1 tablet by mouth Every 4 (Four) Hours As Needed for Nausea., Disp: 30 tablet, Rfl: 0    sildenafil (Viagra) 50 MG tablet, Take 1 tablet by mouth Daily As Needed for Erectile Dysfunction., Disp: 15 tablet, Rfl: 2    triamterene-hydrochlorothiazide (MAXZIDE-25) 37.5-25 MG per tablet, Take 1 tablet by mouth Daily As Needed (edema)., Disp: 30 tablet, Rfl: 11    Semaglutide-Weight Management (Wegovy) 0.25 MG/0.5ML solution auto-injector, Inject 0.25 mg under the skin into the appropriate area as directed Every 7 (Seven) Days. (Patient not taking: Reported on 7/25/2023), Disp: 2 mL, Rfl: 2        Diagnoses and all orders for this visit:    1. Gastroesophageal reflux disease without esophagitis (Primary)  -     Comprehensive Metabolic Panel    2. Morbid (severe) obesity due to excess calories    3. Bipolar affective disorder, current episode hypomanic  -     TSH    4. Rectal bleeding  -     CBC (No Diff)  -     Iron

## 2023-07-26 LAB
ALBUMIN SERPL-MCNC: 4.1 G/DL (ref 3.5–5.2)
ALBUMIN/GLOB SERPL: 1.6 G/DL
ALP SERPL-CCNC: 81 U/L (ref 39–117)
ALT SERPL W P-5'-P-CCNC: 57 U/L (ref 1–41)
ANION GAP SERPL CALCULATED.3IONS-SCNC: 10.4 MMOL/L (ref 5–15)
AST SERPL-CCNC: 33 U/L (ref 1–40)
BILIRUB SERPL-MCNC: 0.5 MG/DL (ref 0–1.2)
BUN SERPL-MCNC: 14 MG/DL (ref 6–20)
BUN/CREAT SERPL: 12.6 (ref 7–25)
CALCIUM SPEC-SCNC: 9.4 MG/DL (ref 8.6–10.5)
CHLORIDE SERPL-SCNC: 104 MMOL/L (ref 98–107)
CO2 SERPL-SCNC: 24.6 MMOL/L (ref 22–29)
CREAT SERPL-MCNC: 1.11 MG/DL (ref 0.76–1.27)
EGFRCR SERPLBLD CKD-EPI 2021: 86.1 ML/MIN/1.73
GLOBULIN UR ELPH-MCNC: 2.6 GM/DL
GLUCOSE SERPL-MCNC: 103 MG/DL (ref 65–99)
IRON 24H UR-MRATE: 69 MCG/DL (ref 59–158)
POTASSIUM SERPL-SCNC: 4.3 MMOL/L (ref 3.5–5.2)
PROT SERPL-MCNC: 6.7 G/DL (ref 6–8.5)
SODIUM SERPL-SCNC: 139 MMOL/L (ref 136–145)
TSH SERPL DL<=0.05 MIU/L-ACNC: 3.43 UIU/ML (ref 0.27–4.2)

## 2023-08-07 ENCOUNTER — TELEPHONE (OUTPATIENT)
Dept: INTERNAL MEDICINE | Facility: CLINIC | Age: 38
End: 2023-08-07
Payer: MEDICAID

## 2023-08-07 NOTE — TELEPHONE ENCOUNTER
Caller: Geremias Serrano    Relationship: Self    Best call back number: [phone]     What medication are you requesting: MEDICATION TO HELP WITH WEIGHT LOSS    What are your current symptoms: WEIGHT ISSUES      If a prescription is needed, what is your preferred pharmacy and phone number: [id number] - [place], [place] - [address] [phone] [facility] [phone] FX     Called and advised of non coverage

## 2023-09-19 ENCOUNTER — OFFICE VISIT (OUTPATIENT)
Dept: INTERNAL MEDICINE | Facility: CLINIC | Age: 38
End: 2023-09-19
Payer: MEDICAID

## 2023-09-19 VITALS
OXYGEN SATURATION: 98 % | WEIGHT: 264 LBS | BODY MASS INDEX: 36.96 KG/M2 | HEIGHT: 71 IN | HEART RATE: 81 BPM | SYSTOLIC BLOOD PRESSURE: 110 MMHG | DIASTOLIC BLOOD PRESSURE: 82 MMHG

## 2023-09-19 DIAGNOSIS — K21.9 GASTROESOPHAGEAL REFLUX DISEASE WITHOUT ESOPHAGITIS: ICD-10-CM

## 2023-09-19 DIAGNOSIS — R60.0 BILATERAL EDEMA OF LOWER EXTREMITY: ICD-10-CM

## 2023-09-19 DIAGNOSIS — F31.0 BIPOLAR AFFECTIVE DISORDER, CURRENT EPISODE HYPOMANIC: ICD-10-CM

## 2023-09-19 DIAGNOSIS — K76.0 FATTY LIVER DISEASE, NONALCOHOLIC: ICD-10-CM

## 2023-09-19 DIAGNOSIS — E78.2 MIXED HYPERLIPIDEMIA: Primary | ICD-10-CM

## 2023-09-19 PROCEDURE — 1160F RVW MEDS BY RX/DR IN RCRD: CPT | Performed by: INTERNAL MEDICINE

## 2023-09-19 PROCEDURE — 99214 OFFICE O/P EST MOD 30 MIN: CPT | Performed by: INTERNAL MEDICINE

## 2023-09-19 PROCEDURE — 1159F MED LIST DOCD IN RCRD: CPT | Performed by: INTERNAL MEDICINE

## 2023-09-19 NOTE — PROGRESS NOTES
Patient is a 38 y.o. male who is here for a follow up of hyperlipidemia.  Chief Complaint   Patient presents with    Hyperlipidemia         HPI:    Here for mgmt of FLD and hyperlipidemia.  Has lost some weight by dieting.  No dizziness or lightheadedness.  No fever or chills.  No abdominal pains.  Sleeping well.  Occasional right knee pain.     History:     Patient Active Problem List   Diagnosis    Anxiety    Bipolar affective disorder    Depression    Gastroesophageal reflux disease without esophagitis    Knee pain    Schizophrenia    Bilateral edema of lower extremity    Abnormal EKG    Fatty liver disease, nonalcoholic    Mixed hyperlipidemia    MS (multiple sclerosis)    Morbid (severe) obesity due to excess calories    Rectal bleeding       Past Medical History:   Diagnosis Date    Abnormal ECG     MS (multiple sclerosis)     Orchitis     Plantar fasciitis        Past Surgical History:   Procedure Laterality Date    LEG SURGERY  05/03/2013    Right Knee       Current Outpatient Medications on File Prior to Visit   Medication Sig    ABILIFY MAINTENA 400 MG Suspension Reconstituted ER IM injection ER     benztropine (COGENTIN) 1 MG tablet Take 1 tablet by mouth 2 (Two) Times a Day.    diclofenac (CATAFLAM) 50 MG tablet TAKE 1 TABLET EVERY 8 HOURS AS NEEDED FOR PAIN    Dimethyl Fumarate (TECFIDERA) capsule delayed-release Take 1 capsule by mouth 2 (Two) Times a Day.    hydrOXYzine (VISTARIL) 25 MG capsule 3 (Three) Times a Day As Needed.    Ofatumumab (Kesimpta) 20 MG/0.4ML solution auto-injector Inject 1 pen under the skin into the appropriate area as directed Every 30 (Thirty) Days.    omeprazole (priLOSEC) 40 MG capsule Take 1 capsule by mouth Daily.    ondansetron (ZOFRAN) 4 MG tablet Take 1 tablet by mouth Every 4 (Four) Hours As Needed for Nausea.    sildenafil (Viagra) 50 MG tablet Take 1 tablet by mouth Daily As Needed for Erectile Dysfunction.    triamterene-hydrochlorothiazide (MAXZIDE-25) 37.5-25 MG  "per tablet Take 1 tablet by mouth Daily As Needed (edema).    [DISCONTINUED] Semaglutide-Weight Management (Wegovy) 0.25 MG/0.5ML solution auto-injector Inject 0.25 mg under the skin into the appropriate area as directed Every 7 (Seven) Days. (Patient not taking: Reported on 7/25/2023)     No current facility-administered medications on file prior to visit.       Family History   Problem Relation Age of Onset    Diabetes Mother     Hypertension Mother     Atrial fibrillation Father     Colon cancer Sister        Social History     Socioeconomic History    Marital status: Single   Tobacco Use    Smoking status: Never    Smokeless tobacco: Never   Substance and Sexual Activity    Alcohol use: Never    Drug use: Never    Sexual activity: Defer         Review of Systems   Constitutional:  Negative for chills, diaphoresis and fever.   HENT:  Negative for congestion, ear pain, hearing loss, nosebleeds, postnasal drip, sinus pressure and sore throat.    Eyes:  Positive for visual disturbance (better). Negative for pain, discharge and itching.   Respiratory:  Negative for cough, chest tightness, shortness of breath and wheezing.    Cardiovascular:  Negative for chest pain, palpitations and leg swelling.   Gastrointestinal:  Positive for constipation. Negative for abdominal distention, abdominal pain, diarrhea, nausea and vomiting.        10/15 colonoscopy normal   Endocrine: Negative for polydipsia and polyuria.   Genitourinary:  Negative for difficulty urinating, dysuria, frequency and hematuria.   Musculoskeletal:  Positive for arthralgias. Negative for gait problem and myalgias.        Knee pain on R>L   Skin:  Negative for rash and wound.   Neurological:  Positive for weakness. Negative for dizziness.   Psychiatric/Behavioral:  Positive for behavioral problems, decreased concentration and dysphoric mood.      /82   Pulse 81   Ht 180.3 cm (70.98\")   Wt 120 kg (264 lb)   SpO2 98%   BMI 36.84 kg/m² "       Physical Exam  Constitutional:       Appearance: Normal appearance. He is well-developed. He is obese.   HENT:      Head: Normocephalic and atraumatic.      Right Ear: External ear normal.      Left Ear: External ear normal.      Nose: Nose normal.      Mouth/Throat:      Mouth: Mucous membranes are moist.      Pharynx: Oropharynx is clear.   Eyes:      Extraocular Movements: Extraocular movements intact.      Conjunctiva/sclera: Conjunctivae normal.      Pupils: Pupils are equal, round, and reactive to light.   Cardiovascular:      Rate and Rhythm: Normal rate and regular rhythm.      Heart sounds: Normal heart sounds.   Pulmonary:      Effort: Pulmonary effort is normal.      Breath sounds: Normal breath sounds.   Abdominal:      General: Bowel sounds are normal.      Palpations: Abdomen is soft.   Genitourinary:     Penis: Normal.       Testes: Normal.   Musculoskeletal:         General: Normal range of motion.      Cervical back: Normal range of motion and neck supple.      Comments: Bilateral knee crepitus   Lymphadenopathy:      Cervical: No cervical adenopathy.   Skin:     General: Skin is warm and dry.   Neurological:      General: No focal deficit present.      Mental Status: He is alert and oriented to person, place, and time.   Psychiatric:         Mood and Affect: Mood normal.         Behavior: Behavior normal.         Thought Content: Thought content normal.       Procedure:      Discussion/Summary:    knee pain-diclofenac prn, stable  gerd-stable on PPI  bipolor/schizophrenia-/depresson-advised compliance with meds , controlled  constipation-colace bid, increase fiber, not an issue  Edema-cont maxzide prn, stable  Abnormal lft/?fld-work up noted, recheck noted, advised wt loss  FH of colon ca/rectal bleeding-colonoscopy on 10/23 planned  Abnormal glucose- AIC noted  MS-per [facility]  Obesity-some better  Elevated BP-wt loss should help         7/26 Labs noted and dw patient, counseled on low  carb/ncs  and low chol    Current Outpatient Medications:     ABILIFY MAINTENA 400 MG Suspension Reconstituted ER IM injection ER, , Disp: , Rfl:     benztropine (COGENTIN) 1 MG tablet, Take 1 tablet by mouth 2 (Two) Times a Day., Disp: , Rfl:     diclofenac (CATAFLAM) 50 MG tablet, TAKE 1 TABLET EVERY 8 HOURS AS NEEDED FOR PAIN, Disp: , Rfl: 0    Dimethyl Fumarate (TECFIDERA) capsule delayed-release, Take 1 capsule by mouth 2 (Two) Times a Day., Disp: , Rfl:     hydrOXYzine (VISTARIL) 25 MG capsule, 3 (Three) Times a Day As Needed., Disp: , Rfl:     Ofatumumab (Kesimpta) 20 MG/0.4ML solution auto-injector, Inject 1 pen under the skin into the appropriate area as directed Every 30 (Thirty) Days., Disp: , Rfl:     omeprazole (priLOSEC) 40 MG capsule, Take 1 capsule by mouth Daily., Disp: 30 capsule, Rfl: 2    ondansetron (ZOFRAN) 4 MG tablet, Take 1 tablet by mouth Every 4 (Four) Hours As Needed for Nausea., Disp: 30 tablet, Rfl: 0    sildenafil (Viagra) 50 MG tablet, Take 1 tablet by mouth Daily As Needed for Erectile Dysfunction., Disp: 15 tablet, Rfl: 2    triamterene-hydrochlorothiazide (MAXZIDE-25) 37.5-25 MG per tablet, Take 1 tablet by mouth Daily As Needed (edema)., Disp: 30 tablet, Rfl: 11        Diagnoses and all orders for this visit:    1. Mixed hyperlipidemia (Primary)    2. Gastroesophageal reflux disease without esophagitis    3. Fatty liver disease, nonalcoholic    4. Bipolar affective disorder, current episode hypomanic    5. Bilateral edema of lower extremity

## 2023-10-16 ENCOUNTER — TELEPHONE (OUTPATIENT)
Dept: GASTROENTEROLOGY | Facility: CLINIC | Age: 38
End: 2023-10-16

## 2023-10-16 NOTE — TELEPHONE ENCOUNTER
Hub staff attempted to follow warm transfer process and was unsuccessful     Caller: Geremias Serrano    Relationship to patient: Self    Best call back number: [phone]    Patient is needing: NEEDS TO RESCHEDULE COLONOSCOPY

## 2023-11-22 ENCOUNTER — HOSPITAL ENCOUNTER (EMERGENCY)
Facility: HOSPITAL | Age: 38
Discharge: HOME | End: 2023-11-22
Attending: EMERGENCY MEDICINE | Admitting: EMERGENCY MEDICINE
Payer: MEDICAID

## 2023-11-22 VITALS
HEIGHT: 70 IN | DIASTOLIC BLOOD PRESSURE: 80 MMHG | RESPIRATION RATE: 18 BRPM | SYSTOLIC BLOOD PRESSURE: 135 MMHG | HEART RATE: 94 BPM | TEMPERATURE: 99.6 F | BODY MASS INDEX: 38.65 KG/M2 | OXYGEN SATURATION: 97 % | WEIGHT: 270 LBS

## 2023-11-22 DIAGNOSIS — U07.1 COVID-19: Primary | ICD-10-CM

## 2023-11-22 LAB
FLUAV RNA RESP QL NAA+PROBE: NOT DETECTED
FLUBV RNA RESP QL NAA+PROBE: NOT DETECTED
SARS-COV-2 RNA RESP QL NAA+PROBE: DETECTED

## 2023-11-22 PROCEDURE — 99283 EMERGENCY DEPT VISIT LOW MDM: CPT

## 2023-11-22 PROCEDURE — 87636 SARSCOV2 & INF A&B AMP PRB: CPT | Performed by: EMERGENCY MEDICINE

## 2023-11-22 RX ORDER — ACETAMINOPHEN 500 MG
1000 TABLET ORAL ONCE
Status: COMPLETED | OUTPATIENT
Start: 2023-11-22 | End: 2023-11-22

## 2023-11-22 RX ADMIN — ACETAMINOPHEN 1000 MG: 500 TABLET ORAL at 15:54

## 2023-11-22 NOTE — ED PROVIDER NOTES
Subjective   History of Present Illness  38-year-old male presents requesting a COVID-19 test.  Of note, the patient tells me that he has not felt well for the past 2 to 3 days and over that span has been experiencing cough, congestion, headache, myalgias, subjective fever.  He denies any known exposures to anyone with COVID-19.  He went to urgent care today where he tested positive for COVID-19 but did not feel that this was a true positive and as result came here for a second opinion and a repeat test.      Review of Systems   Constitutional:  Positive for fatigue and fever.   HENT:  Positive for congestion.    Respiratory:  Positive for cough.    Musculoskeletal:  Positive for myalgias.   Neurological:  Positive for headaches.   All other systems reviewed and are negative.      Past Medical History:   Diagnosis Date    Abnormal ECG     MS (multiple sclerosis)     Orchitis     Plantar fasciitis        No Known Allergies    Past Surgical History:   Procedure Laterality Date    LEG SURGERY  05/03/2013    Right Knee       Family History   Problem Relation Age of Onset    Diabetes Mother     Hypertension Mother     Atrial fibrillation Father     Colon cancer Sister        Social History     Socioeconomic History    Marital status: Single   Tobacco Use    Smoking status: Never    Smokeless tobacco: Never   Substance and Sexual Activity    Alcohol use: Never    Drug use: Never    Sexual activity: Defer           Objective   Physical Exam  Vitals and nursing note reviewed.   Constitutional:       General: He is not in acute distress.     Appearance: Normal appearance. He is well-developed. He is not diaphoretic.      Comments: Well-appearing male in no acute distress   HENT:      Head: Normocephalic and atraumatic.      Comments: Posterior oropharynx is clear and without erythema or exudate, uvula is midline, normal voice  Eyes:      Pupils: Pupils are equal, round, and reactive to light.   Neck:      Vascular: No JVD.       Comments: No meningeal signs or nuchal rigidity  Cardiovascular:      Rate and Rhythm: Normal rate and regular rhythm.      Heart sounds: Normal heart sounds. No murmur heard.     No friction rub. No gallop.   Pulmonary:      Effort: Pulmonary effort is normal. No respiratory distress.      Breath sounds: Normal breath sounds. No wheezing or rales.   Musculoskeletal:         General: Normal range of motion.      Cervical back: Normal range of motion.   Skin:     General: Skin is warm and dry.      Coloration: Skin is not pale.      Findings: No erythema or rash.   Neurological:      Mental Status: He is alert and oriented to person, place, and time.   Psychiatric:         Thought Content: Thought content normal.         Judgment: Judgment normal.         Procedures           ED Course  ED Course as of 11/22/23 1701 Wed Nov 22, 2023 1700 38-year-old male presents requesting a COVID-19 test.  Of note, the patient tells me that he has not felt well for the past 2 to 3 days and over that span has been experiencing cough, congestion, headache, myalgias, and subjective fever.  He went to urgent care today where he tested positive for COVID-19.  However, he did not feel that this was a true positive and came here today for repeat testing.  On arrival, the patient is nontoxic-appearing.  Exam is unremarkable.  Tylenol given for symptom relief.  COVID-19 test is positive.  Normal oxygen saturations.  Normal work of breathing.  I feel that the patient can be safely managed on an outpatient basis at this point.  We discussed conservative measures to help him with his symptoms.  He will follow-up with his primary care physician within the next week.  Agreeable with plan and given appropriate strict return precautions. [DD]      ED Course User Index  [DD] Jonh Sumner MD                                 Recent Results (from the past 24 hour(s))   COVID-19 and FLU A/B PCR, 1 HR TAT - Swab, Nasopharynx     "Collection Time: 11/22/23  3:08 PM    Specimen: Nasopharynx; Swab   Result Value Ref Range    COVID19 Detected (C) Not Detected - Ref. Range    Influenza A PCR Not Detected Not Detected    Influenza B PCR Not Detected Not Detected     Note: In addition to lab results from this visit, the labs listed above may include labs taken at another facility or during a different encounter within the last 24 hours. Please correlate lab times with ED admission and discharge times for further clarification of the services performed during this visit.    No orders to display     Vitals:    11/22/23 1419   BP: 135/80   BP Location: Left arm   Patient Position: Sitting   Pulse: 94   Resp: 18   Temp: 99.6 °F (37.6 °C)   TempSrc: Oral   SpO2: 97%   Weight: 122 kg (270 lb)   Height: 177.8 cm (70\")     Medications   acetaminophen (TYLENOL) tablet 1,000 mg (1,000 mg Oral Given 11/22/23 1554)     ECG/EMG Results (last 24 hours)       ** No results found for the last 24 hours. **          No orders to display                 Medical Decision Making  Problems Addressed:  COVID-19: acute illness or injury    Risk  OTC drugs.        Final diagnoses:   COVID-19       ED Disposition  ED Disposition       ED Disposition   Discharge    Condition   Stable    Comment   --               Feliberto Cruz MD  [id number]  [place] [address]  [phone]    In 1 week           Medication List      No changes were made to your prescriptions during this visit.            Jonh Sumner MD  11/22/23 7738    "

## 2023-11-29 ENCOUNTER — TELEPHONE (OUTPATIENT)
Dept: INTERNAL MEDICINE | Facility: CLINIC | Age: 38
End: 2023-11-29
Payer: MEDICAID

## 2023-11-29 NOTE — TELEPHONE ENCOUNTER
Hub staff attempted to follow warm transfer process and was unsuccessful     Caller: Geremias Serrano    Relationship to patient: Self    Best call back number: [phone]    Patient is needing: PATIENT WAS SEEN AT [facility] ON 11/22/2023 AND NEED TO FOLLOW UP WITH DR SPENCE. NEXT AVAILABLE WASN'T UNTIL 12/07/2023. PLEASE CALL PATIENT TO SCHEDULE

## 2023-12-01 ENCOUNTER — OFFICE VISIT (OUTPATIENT)
Dept: INTERNAL MEDICINE | Facility: CLINIC | Age: 38
End: 2023-12-01
Payer: MEDICAID

## 2023-12-01 VITALS
HEIGHT: 70 IN | DIASTOLIC BLOOD PRESSURE: 78 MMHG | OXYGEN SATURATION: 98 % | HEART RATE: 77 BPM | SYSTOLIC BLOOD PRESSURE: 100 MMHG | BODY MASS INDEX: 37.08 KG/M2 | WEIGHT: 259 LBS

## 2023-12-01 DIAGNOSIS — G35 MS (MULTIPLE SCLEROSIS): ICD-10-CM

## 2023-12-01 DIAGNOSIS — E78.2 MIXED HYPERLIPIDEMIA: Primary | ICD-10-CM

## 2023-12-01 DIAGNOSIS — F31.0 BIPOLAR AFFECTIVE DISORDER, CURRENT EPISODE HYPOMANIC: ICD-10-CM

## 2023-12-01 DIAGNOSIS — K21.9 GASTROESOPHAGEAL REFLUX DISEASE WITHOUT ESOPHAGITIS: ICD-10-CM

## 2023-12-01 DIAGNOSIS — K76.0 FATTY LIVER DISEASE, NONALCOHOLIC: ICD-10-CM

## 2023-12-01 DIAGNOSIS — H66.002 NON-RECURRENT ACUTE SUPPURATIVE OTITIS MEDIA OF LEFT EAR WITHOUT SPONTANEOUS RUPTURE OF TYMPANIC MEMBRANE: ICD-10-CM

## 2023-12-01 PROCEDURE — 1159F MED LIST DOCD IN RCRD: CPT | Performed by: INTERNAL MEDICINE

## 2023-12-01 PROCEDURE — 1160F RVW MEDS BY RX/DR IN RCRD: CPT | Performed by: INTERNAL MEDICINE

## 2023-12-01 PROCEDURE — 99214 OFFICE O/P EST MOD 30 MIN: CPT | Performed by: INTERNAL MEDICINE

## 2023-12-01 RX ORDER — AMOXICILLIN 875 MG/1
875 TABLET, COATED ORAL EVERY 12 HOURS SCHEDULED
Qty: 20 TABLET | Refills: 0 | Status: SHIPPED | OUTPATIENT
Start: 2023-12-01 | End: 2023-12-11

## 2023-12-01 NOTE — PROGRESS NOTES
Patient is a 38 y.o. male who is here for a follow up of recent UTC and ER visit for COVID>  Chief Complaint   Patient presents with    Follow-up     ER followup for COVID         HPI:    Here for f/u after ER visit on 11/22 for Covid.  Feels better.  No dizziness or lightheadedness.  Occasional cough but better.  No fever or chills.  No sore throat.  No abdominal pains.  No loss of taste or smell.     History:     Patient Active Problem List   Diagnosis    Anxiety    Bipolar affective disorder    Depression    Gastroesophageal reflux disease without esophagitis    Knee pain    Schizophrenia    Bilateral edema of lower extremity    Abnormal EKG    Fatty liver disease, nonalcoholic    Mixed hyperlipidemia    MS (multiple sclerosis)    Morbid (severe) obesity due to excess calories    Rectal bleeding    Non-recurrent acute suppurative otitis media of left ear without spontaneous rupture of tympanic membrane       Past Medical History:   Diagnosis Date    Abnormal ECG     MS (multiple sclerosis)     Orchitis     Plantar fasciitis        Past Surgical History:   Procedure Laterality Date    LEG SURGERY  05/03/2013    Right Knee       Current Outpatient Medications on File Prior to Visit   Medication Sig    ABILIFY MAINTENA 400 MG Suspension Reconstituted ER IM injection ER     benztropine (COGENTIN) 1 MG tablet Take 1 tablet by mouth 2 (Two) Times a Day.    diclofenac (CATAFLAM) 50 MG tablet TAKE 1 TABLET EVERY 8 HOURS AS NEEDED FOR PAIN    Dimethyl Fumarate (TECFIDERA) capsule delayed-release Take 1 capsule by mouth 2 (Two) Times a Day.    hydrOXYzine (VISTARIL) 25 MG capsule 3 (Three) Times a Day As Needed.    Ofatumumab (Kesimpta) 20 MG/0.4ML solution auto-injector Inject 1 pen under the skin into the appropriate area as directed Every 30 (Thirty) Days.    omeprazole (priLOSEC) 40 MG capsule Take 1 capsule by mouth Daily.    ondansetron (ZOFRAN) 4 MG tablet Take 1 tablet by mouth Every 4 (Four) Hours As Needed for  "Nausea.    sildenafil (Viagra) 50 MG tablet Take 1 tablet by mouth Daily As Needed for Erectile Dysfunction.    triamterene-hydrochlorothiazide (MAXZIDE-25) 37.5-25 MG per tablet Take 1 tablet by mouth Daily As Needed (edema).     No current facility-administered medications on file prior to visit.       Family History   Problem Relation Age of Onset    Diabetes Mother     Hypertension Mother     Atrial fibrillation Father     Colon cancer Sister        Social History     Socioeconomic History    Marital status: Single   Tobacco Use    Smoking status: Never    Smokeless tobacco: Never   Substance and Sexual Activity    Alcohol use: Never    Drug use: Never    Sexual activity: Defer         Review of Systems   Constitutional:  Negative for chills, diaphoresis and fever.   HENT:  Negative for congestion, ear pain, hearing loss, nosebleeds, postnasal drip, sinus pressure and sore throat.    Eyes:  Positive for visual disturbance (better). Negative for pain, discharge and itching.   Respiratory:  Positive for cough. Negative for chest tightness, shortness of breath and wheezing.    Cardiovascular:  Negative for chest pain, palpitations and leg swelling.   Gastrointestinal:  Positive for constipation. Negative for abdominal distention, abdominal pain, diarrhea, nausea and vomiting.        10/15 colonoscopy normal   Endocrine: Negative for polydipsia and polyuria.   Genitourinary:  Negative for difficulty urinating, dysuria, frequency and hematuria.   Musculoskeletal:  Positive for arthralgias. Negative for gait problem and myalgias.        Knee pain on R>L   Skin:  Negative for rash and wound.   Neurological:  Positive for weakness. Negative for dizziness.   Psychiatric/Behavioral:  Positive for behavioral problems, decreased concentration and dysphoric mood.        /78   Pulse 77   Ht 177.8 cm (70\")   Wt 117 kg (259 lb)   SpO2 98%   BMI 37.16 kg/m²       Physical Exam  Constitutional:       Appearance: Normal " appearance. He is well-developed. He is obese.   HENT:      Head: Normocephalic and atraumatic.      Right Ear: External ear normal.      Left Ear: External ear normal.      Ears:      Comments: Left TM red     Nose: Nose normal.      Mouth/Throat:      Mouth: Mucous membranes are moist.      Pharynx: Oropharynx is clear.   Eyes:      Extraocular Movements: Extraocular movements intact.      Conjunctiva/sclera: Conjunctivae normal.      Pupils: Pupils are equal, round, and reactive to light.   Cardiovascular:      Rate and Rhythm: Normal rate and regular rhythm.      Heart sounds: Normal heart sounds.   Pulmonary:      Effort: Pulmonary effort is normal.      Breath sounds: Normal breath sounds.   Abdominal:      General: Bowel sounds are normal.      Palpations: Abdomen is soft.   Genitourinary:     Penis: Normal.       Testes: Normal.   Musculoskeletal:         General: Normal range of motion.      Cervical back: Normal range of motion and neck supple.      Comments: Bilateral knee crepitus   Lymphadenopathy:      Cervical: No cervical adenopathy.   Skin:     General: Skin is warm and dry.   Neurological:      General: No focal deficit present.      Mental Status: He is alert and oriented to person, place, and time.   Psychiatric:         Mood and Affect: Mood normal.         Behavior: Behavior normal.         Thought Content: Thought content normal.         Procedure:      Discussion/Summary:    knee pain-diclofenac prn, stable  gerd-stable on PPI  bipolor/schizophrenia-/depresson-advised compliance with meds , controlled  constipation-colace bid, increase fiber, not an issue  Edema-cont maxzide prn, stable  Abnormal lft/?fld-work up noted, recheck noted, advised wt loss  FH of colon ca/rectal bleeding-colonoscopy on 10/23 planned but her never recd the prep, he wants to delay  Abnormal glucose- AIC noted  MS-per [facility]  Obesity-some better  Elevated BP-wt loss should help  S/p covid-better  OM-rx  Amoxil    prior Labs noted and dw patient, counseled on low carb/ncs  and low chol    Current Outpatient Medications:     ABILIFY MAINTENA 400 MG Suspension Reconstituted ER IM injection ER, , Disp: , Rfl:     benztropine (COGENTIN) 1 MG tablet, Take 1 tablet by mouth 2 (Two) Times a Day., Disp: , Rfl:     diclofenac (CATAFLAM) 50 MG tablet, TAKE 1 TABLET EVERY 8 HOURS AS NEEDED FOR PAIN, Disp: , Rfl: 0    Dimethyl Fumarate (TECFIDERA) capsule delayed-release, Take 1 capsule by mouth 2 (Two) Times a Day., Disp: , Rfl:     hydrOXYzine (VISTARIL) 25 MG capsule, 3 (Three) Times a Day As Needed., Disp: , Rfl:     Ofatumumab (Kesimpta) 20 MG/0.4ML solution auto-injector, Inject 1 pen under the skin into the appropriate area as directed Every 30 (Thirty) Days., Disp: , Rfl:     omeprazole (priLOSEC) 40 MG capsule, Take 1 capsule by mouth Daily., Disp: 30 capsule, Rfl: 2    ondansetron (ZOFRAN) 4 MG tablet, Take 1 tablet by mouth Every 4 (Four) Hours As Needed for Nausea., Disp: 30 tablet, Rfl: 0    sildenafil (Viagra) 50 MG tablet, Take 1 tablet by mouth Daily As Needed for Erectile Dysfunction., Disp: 15 tablet, Rfl: 2    triamterene-hydrochlorothiazide (MAXZIDE-25) 37.5-25 MG per tablet, Take 1 tablet by mouth Daily As Needed (edema)., Disp: 30 tablet, Rfl: 11    amoxicillin (AMOXIL) 875 MG tablet, Take 1 tablet by mouth Every 12 (Twelve) Hours for 10 days., Disp: 20 tablet, Rfl: 0        Diagnoses and all orders for this visit:    1. Mixed hyperlipidemia (Primary)    2. Gastroesophageal reflux disease without esophagitis    3. Fatty liver disease, nonalcoholic    4. Bipolar affective disorder, current episode hypomanic    5. MS (multiple sclerosis)    6. Non-recurrent acute suppurative otitis media of left ear without spontaneous rupture of tympanic membrane  -     amoxicillin (AMOXIL) 875 MG tablet; Take 1 tablet by mouth Every 12 (Twelve) Hours for 10 days.  Dispense: 20 tablet; Refill: 0

## 2024-01-30 ENCOUNTER — OFFICE VISIT (OUTPATIENT)
Dept: INTERNAL MEDICINE | Facility: CLINIC | Age: 39
End: 2024-01-30
Payer: MEDICAID

## 2024-01-30 VITALS
HEIGHT: 70 IN | DIASTOLIC BLOOD PRESSURE: 74 MMHG | WEIGHT: 270 LBS | SYSTOLIC BLOOD PRESSURE: 120 MMHG | BODY MASS INDEX: 38.65 KG/M2 | HEART RATE: 82 BPM | OXYGEN SATURATION: 98 %

## 2024-01-30 DIAGNOSIS — E66.01 MORBID (SEVERE) OBESITY DUE TO EXCESS CALORIES: ICD-10-CM

## 2024-01-30 DIAGNOSIS — K76.0 FATTY LIVER DISEASE, NONALCOHOLIC: ICD-10-CM

## 2024-01-30 DIAGNOSIS — F31.0 BIPOLAR AFFECTIVE DISORDER, CURRENT EPISODE HYPOMANIC: ICD-10-CM

## 2024-01-30 DIAGNOSIS — G35 MS (MULTIPLE SCLEROSIS): ICD-10-CM

## 2024-01-30 DIAGNOSIS — E78.2 MIXED HYPERLIPIDEMIA: Primary | ICD-10-CM

## 2024-01-30 DIAGNOSIS — K21.9 GASTROESOPHAGEAL REFLUX DISEASE WITHOUT ESOPHAGITIS: ICD-10-CM

## 2024-01-30 DIAGNOSIS — Z00.00 ROUTINE GENERAL MEDICAL EXAMINATION AT A HEALTH CARE FACILITY: ICD-10-CM

## 2024-01-30 PROCEDURE — 99396 PREV VISIT EST AGE 40-64: CPT | Performed by: INTERNAL MEDICINE

## 2024-01-30 PROCEDURE — 1160F RVW MEDS BY RX/DR IN RCRD: CPT | Performed by: INTERNAL MEDICINE

## 2024-01-30 PROCEDURE — 1159F MED LIST DOCD IN RCRD: CPT | Performed by: INTERNAL MEDICINE

## 2024-01-30 NOTE — PROGRESS NOTES
Patient is a 38 y.o. male who is here for a physical.  Chief Complaint   Patient presents with    Annual Exam         HPI:    Here for CPE.  Has already eaten today.    History:     Patient Active Problem List   Diagnosis    Anxiety    Bipolar affective disorder    Depression    Gastroesophageal reflux disease without esophagitis    Knee pain    Schizophrenia    Bilateral edema of lower extremity    Abnormal EKG    Fatty liver disease, nonalcoholic    Mixed hyperlipidemia    MS (multiple sclerosis)    Morbid (severe) obesity due to excess calories    Rectal bleeding    Non-recurrent acute suppurative otitis media of left ear without spontaneous rupture of tympanic membrane       Past Medical History:   Diagnosis Date    Abnormal ECG     MS (multiple sclerosis)     Orchitis     Plantar fasciitis        Past Surgical History:   Procedure Laterality Date    LEG SURGERY  05/03/2013    Right Knee       Current Outpatient Medications on File Prior to Visit   Medication Sig    ABILIFY MAINTENA 400 MG Suspension Reconstituted ER IM injection ER     benztropine (COGENTIN) 1 MG tablet Take 1 tablet by mouth 2 (Two) Times a Day.    diclofenac (CATAFLAM) 50 MG tablet TAKE 1 TABLET EVERY 8 HOURS AS NEEDED FOR PAIN    hydrOXYzine (VISTARIL) 25 MG capsule 3 (Three) Times a Day As Needed.    Ofatumumab (Kesimpta) 20 MG/0.4ML solution auto-injector Inject 1 pen under the skin into the appropriate area as directed Every 30 (Thirty) Days.    omeprazole (priLOSEC) 40 MG capsule Take 1 capsule by mouth Daily.    ondansetron (ZOFRAN) 4 MG tablet Take 1 tablet by mouth Every 4 (Four) Hours As Needed for Nausea.    sildenafil (Viagra) 50 MG tablet Take 1 tablet by mouth Daily As Needed for Erectile Dysfunction.    triamterene-hydrochlorothiazide (MAXZIDE-25) 37.5-25 MG per tablet Take 1 tablet by mouth Daily As Needed (edema).    [DISCONTINUED] Dimethyl Fumarate (TECFIDERA) capsule delayed-release Take 1 capsule by mouth 2 (Two) Times a  "Day. (Patient not taking: Reported on 1/30/2024)     No current facility-administered medications on file prior to visit.       Family History   Problem Relation Age of Onset    Diabetes Mother     Hypertension Mother     Atrial fibrillation Father     Colon cancer Sister        Social History     Socioeconomic History    Marital status: Single   Tobacco Use    Smoking status: Never    Smokeless tobacco: Never   Substance and Sexual Activity    Alcohol use: Never    Drug use: Never    Sexual activity: Defer         Review of Systems   Constitutional:  Negative for chills, diaphoresis and fever.   HENT:  Negative for congestion, ear pain, hearing loss, nosebleeds, postnasal drip, sinus pressure and sore throat.    Eyes:  Positive for visual disturbance (better). Negative for pain, discharge and itching.   Respiratory:  Negative for chest tightness, shortness of breath and wheezing.    Cardiovascular:  Negative for chest pain, palpitations and leg swelling.   Gastrointestinal:  Negative for abdominal distention, abdominal pain, diarrhea, nausea and vomiting.        10/15 colonoscopy normal   Endocrine: Negative for polydipsia and polyuria.   Genitourinary:  Negative for difficulty urinating, dysuria, frequency and hematuria.   Musculoskeletal:  Positive for arthralgias. Negative for gait problem and myalgias.        Knee pain on R>L   Skin:  Negative for rash and wound.   Neurological:  Negative for dizziness.   Psychiatric/Behavioral:  Positive for behavioral problems, decreased concentration and dysphoric mood.        /74   Pulse 82   Ht 177.8 cm (70\")   Wt 122 kg (270 lb)   SpO2 98%   BMI 38.74 kg/m²       Physical Exam  Constitutional:       Appearance: Normal appearance. He is well-developed. He is obese.   HENT:      Head: Normocephalic and atraumatic.      Right Ear: External ear normal.      Left Ear: External ear normal.      Nose: Nose normal.      Mouth/Throat:      Mouth: Mucous membranes are " moist.      Pharynx: Oropharynx is clear.   Eyes:      Extraocular Movements: Extraocular movements intact.      Conjunctiva/sclera: Conjunctivae normal.      Pupils: Pupils are equal, round, and reactive to light.   Cardiovascular:      Rate and Rhythm: Normal rate and regular rhythm.      Heart sounds: Normal heart sounds.   Pulmonary:      Effort: Pulmonary effort is normal.      Breath sounds: Normal breath sounds.   Abdominal:      General: Bowel sounds are normal.      Palpations: Abdomen is soft.   Musculoskeletal:         General: Normal range of motion.      Cervical back: Normal range of motion and neck supple.      Comments: Bilateral knee crepitus   Lymphadenopathy:      Cervical: No cervical adenopathy.   Skin:     General: Skin is warm and dry.   Neurological:      General: No focal deficit present.      Mental Status: He is alert and oriented to person, place, and time.   Psychiatric:         Mood and Affect: Mood normal.         Behavior: Behavior normal.         Thought Content: Thought content normal.         Procedure:      Discussion/Summary:      HEM-counseled on diet and exercise, fasting labs soon  knee pain-diclofenac prn, stable  gerd-stable on PPI  bipolor/schizophrenia-/depresson-advised compliance with meds , controlled  constipation-colace bid, increase fiber, not an issue  Edema-cont maxzide prn, stable  Abnormal lft/?fld-work up noted, recheck noted, advised wt loss  FH of colon ca/rectal bleeding-colonoscopy on 10/23 planned but her never recd the prep, he wants to delay  Abnormal glucose- AIC noted  MS-per [facility]  Obesity-still an issue, insurance not wanting to do GLP1  Elevated BP-wt loss should help     prior Labs noted and dw patient, counseled on low carb/ncs  and low chol    Reviewed the following with the patient: advised patient to avoid alcoholic beverages, encouraged patient to exercise 5-7 days per week for 30 minutes at a time, ideal body weight discussed with  patient, and weight loss encouraged.      Current Outpatient Medications:     ABILIFY MAINTENA 400 MG Suspension Reconstituted ER IM injection ER, , Disp: , Rfl:     benztropine (COGENTIN) 1 MG tablet, Take 1 tablet by mouth 2 (Two) Times a Day., Disp: , Rfl:     diclofenac (CATAFLAM) 50 MG tablet, TAKE 1 TABLET EVERY 8 HOURS AS NEEDED FOR PAIN, Disp: , Rfl: 0    hydrOXYzine (VISTARIL) 25 MG capsule, 3 (Three) Times a Day As Needed., Disp: , Rfl:     Ofatumumab (Kesimpta) 20 MG/0.4ML solution auto-injector, Inject 1 pen under the skin into the appropriate area as directed Every 30 (Thirty) Days., Disp: , Rfl:     omeprazole (priLOSEC) 40 MG capsule, Take 1 capsule by mouth Daily., Disp: 30 capsule, Rfl: 2    ondansetron (ZOFRAN) 4 MG tablet, Take 1 tablet by mouth Every 4 (Four) Hours As Needed for Nausea., Disp: 30 tablet, Rfl: 0    sildenafil (Viagra) 50 MG tablet, Take 1 tablet by mouth Daily As Needed for Erectile Dysfunction., Disp: 15 tablet, Rfl: 2    triamterene-hydrochlorothiazide (MAXZIDE-25) 37.5-25 MG per tablet, Take 1 tablet by mouth Daily As Needed (edema)., Disp: 30 tablet, Rfl: 11        Diagnoses and all orders for this visit:    1. Mixed hyperlipidemia (Primary)    2. Morbid (severe) obesity due to excess calories    3. Fatty liver disease, nonalcoholic    4. Gastroesophageal reflux disease without esophagitis    5. Bipolar affective disorder, current episode hypomanic    6. MS (multiple sclerosis)    7. Routine general medical examination at a health care facility

## 2024-05-29 ENCOUNTER — OFFICE VISIT (OUTPATIENT)
Dept: INTERNAL MEDICINE | Facility: CLINIC | Age: 39
End: 2024-05-29
Payer: MEDICAID

## 2024-05-29 ENCOUNTER — LAB (OUTPATIENT)
Dept: INTERNAL MEDICINE | Facility: CLINIC | Age: 39
End: 2024-05-29
Payer: MEDICAID

## 2024-05-29 VITALS
BODY MASS INDEX: 38.51 KG/M2 | DIASTOLIC BLOOD PRESSURE: 80 MMHG | HEART RATE: 74 BPM | SYSTOLIC BLOOD PRESSURE: 112 MMHG | HEIGHT: 70 IN | WEIGHT: 269 LBS | OXYGEN SATURATION: 98 %

## 2024-05-29 DIAGNOSIS — R73.09 ABNORMAL GLUCOSE: ICD-10-CM

## 2024-05-29 DIAGNOSIS — R60.0 BILATERAL EDEMA OF LOWER EXTREMITY: ICD-10-CM

## 2024-05-29 DIAGNOSIS — E78.2 MIXED HYPERLIPIDEMIA: Primary | ICD-10-CM

## 2024-05-29 DIAGNOSIS — K76.0 FATTY LIVER DISEASE, NONALCOHOLIC: ICD-10-CM

## 2024-05-29 DIAGNOSIS — K21.9 GASTROESOPHAGEAL REFLUX DISEASE WITHOUT ESOPHAGITIS: ICD-10-CM

## 2024-05-29 LAB
DEPRECATED RDW RBC AUTO: 40.5 FL (ref 37–54)
ERYTHROCYTE [DISTWIDTH] IN BLOOD BY AUTOMATED COUNT: 13 % (ref 12.3–15.4)
HCT VFR BLD AUTO: 44.5 % (ref 37.5–51)
HGB BLD-MCNC: 14.8 G/DL (ref 13–17.7)
MCH RBC QN AUTO: 28.5 PG (ref 26.6–33)
MCHC RBC AUTO-ENTMCNC: 33.3 G/DL (ref 31.5–35.7)
MCV RBC AUTO: 85.7 FL (ref 79–97)
PLATELET # BLD AUTO: 311 10*3/MM3 (ref 140–450)
PMV BLD AUTO: 10.1 FL (ref 6–12)
RBC # BLD AUTO: 5.19 10*6/MM3 (ref 4.14–5.8)
TSH SERPL DL<=0.05 MIU/L-ACNC: 3.43 UIU/ML (ref 0.27–4.2)
WBC NRBC COR # BLD AUTO: 10.35 10*3/MM3 (ref 3.4–10.8)

## 2024-05-29 PROCEDURE — 83036 HEMOGLOBIN GLYCOSYLATED A1C: CPT | Performed by: INTERNAL MEDICINE

## 2024-05-29 PROCEDURE — 80061 LIPID PANEL: CPT | Performed by: INTERNAL MEDICINE

## 2024-05-29 PROCEDURE — 80050 GENERAL HEALTH PANEL: CPT | Performed by: INTERNAL MEDICINE

## 2024-05-29 PROCEDURE — 99214 OFFICE O/P EST MOD 30 MIN: CPT | Performed by: INTERNAL MEDICINE

## 2024-05-29 PROCEDURE — 36415 COLL VENOUS BLD VENIPUNCTURE: CPT | Performed by: INTERNAL MEDICINE

## 2024-05-29 PROCEDURE — 1159F MED LIST DOCD IN RCRD: CPT | Performed by: INTERNAL MEDICINE

## 2024-05-29 PROCEDURE — 1160F RVW MEDS BY RX/DR IN RCRD: CPT | Performed by: INTERNAL MEDICINE

## 2024-05-29 NOTE — PROGRESS NOTES
Patient is a 39 y.o. male who is here for a follow up of hyperlipidemia.  Chief Complaint   Patient presents with    Hyperlipidemia         HPI:    Here for mgmt of hyperlipidemia and abnormal glucose.  His knees bother him when he plays [employer].   No abdominal pains.  Energy level is good.  Energy level is good.  No CP.  Sleeping ok.  GERD is controlled.  No nausea or emesis.    History:     Patient Active Problem List   Diagnosis    Anxiety    Bipolar affective disorder    Depression    Gastroesophageal reflux disease without esophagitis    Knee pain    Schizophrenia    Bilateral edema of lower extremity    Abnormal EKG    Fatty liver disease, nonalcoholic    Mixed hyperlipidemia    MS (multiple sclerosis)    Morbid (severe) obesity due to excess calories    Rectal bleeding    Non-recurrent acute suppurative otitis media of left ear without spontaneous rupture of tympanic membrane       Past Medical History:   Diagnosis Date    Abnormal ECG     MS (multiple sclerosis)     Orchitis     Plantar fasciitis        Past Surgical History:   Procedure Laterality Date    LEG SURGERY  05/03/2013    Right Knee       Current Outpatient Medications on File Prior to Visit   Medication Sig    ABILIFY MAINTENA 400 MG Suspension Reconstituted ER IM injection ER     benztropine (COGENTIN) 1 MG tablet Take 1 tablet by mouth 2 (Two) Times a Day.    diclofenac (CATAFLAM) 50 MG tablet TAKE 1 TABLET EVERY 8 HOURS AS NEEDED FOR PAIN    hydrOXYzine (VISTARIL) 25 MG capsule 3 (Three) Times a Day As Needed.    Ofatumumab (Kesimpta) 20 MG/0.4ML solution auto-injector Inject 1 pen under the skin into the appropriate area as directed Every 30 (Thirty) Days.    omeprazole (priLOSEC) 40 MG capsule Take 1 capsule by mouth Daily.    ondansetron (ZOFRAN) 4 MG tablet Take 1 tablet by mouth Every 4 (Four) Hours As Needed for Nausea.    sildenafil (Viagra) 50 MG tablet Take 1 tablet by mouth Daily As Needed for Erectile Dysfunction.     "triamterene-hydrochlorothiazide (MAXZIDE-25) 37.5-25 MG per tablet Take 1 tablet by mouth Daily As Needed (edema).     No current facility-administered medications on file prior to visit.       Family History   Problem Relation Age of Onset    Diabetes Mother     Hypertension Mother     Atrial fibrillation Father     Colon cancer Sister        Social History     Socioeconomic History    Marital status: Single   Tobacco Use    Smoking status: Never    Smokeless tobacco: Never   Vaping Use    Vaping status: Never Used   Substance and Sexual Activity    Alcohol use: Never    Drug use: Never    Sexual activity: Defer         Review of Systems   Constitutional:  Negative for chills, diaphoresis and fever.   HENT:  Negative for congestion, ear pain, hearing loss, nosebleeds, postnasal drip, sinus pressure and sore throat.    Eyes:  Positive for visual disturbance (better). Negative for pain, discharge and itching.   Respiratory:  Negative for chest tightness, shortness of breath and wheezing.    Cardiovascular:  Negative for chest pain, palpitations and leg swelling.   Gastrointestinal:  Negative for abdominal distention, abdominal pain, diarrhea, nausea and vomiting.        10/15 colonoscopy normal   Endocrine: Negative for polydipsia and polyuria.   Genitourinary:  Negative for difficulty urinating, dysuria, frequency and hematuria.   Musculoskeletal:  Positive for arthralgias. Negative for gait problem and myalgias.        Knee pain on R>L   Skin:  Negative for rash and wound.   Neurological:  Negative for dizziness.   Psychiatric/Behavioral:  Positive for behavioral problems, decreased concentration and dysphoric mood.        /80   Pulse 74   Ht 177.8 cm (70\")   Wt 122 kg (269 lb)   SpO2 98%   BMI 38.60 kg/m²       Physical Exam  Constitutional:       Appearance: Normal appearance. He is well-developed. He is obese.   HENT:      Head: Normocephalic and atraumatic.      Right Ear: External ear normal.      " Left Ear: External ear normal.      Nose: Nose normal.      Mouth/Throat:      Mouth: Mucous membranes are moist.      Pharynx: Oropharynx is clear.   Eyes:      Extraocular Movements: Extraocular movements intact.      Conjunctiva/sclera: Conjunctivae normal.      Pupils: Pupils are equal, round, and reactive to light.   Cardiovascular:      Rate and Rhythm: Normal rate and regular rhythm.      Heart sounds: Normal heart sounds.   Pulmonary:      Effort: Pulmonary effort is normal.      Breath sounds: Normal breath sounds.   Abdominal:      General: Bowel sounds are normal.      Palpations: Abdomen is soft.   Musculoskeletal:         General: Normal range of motion.      Cervical back: Normal range of motion and neck supple.      Comments: Bilateral knee crepitus   Lymphadenopathy:      Cervical: No cervical adenopathy.   Skin:     General: Skin is warm and dry.   Neurological:      General: No focal deficit present.      Mental Status: He is alert and oriented to person, place, and time.   Psychiatric:         Mood and Affect: Mood normal.         Behavior: Behavior normal.         Thought Content: Thought content normal.         Procedure:      Discussion/Summary:    knee pain-diclofenac prn, stable  gerd-stable on PPI  bipolor/schizophrenia-/depresson-advised compliance with meds , controlled  constipation-colace bid, increase fiber, not an issue  Edema-cont maxzide prn, stable  Abnormal lft/?fld-work up noted, recheck noted, advised wt loss  FH of colon ca/rectal bleeding-colonoscopy on 10/23 planned but her never recd the prep, he wants to delay, advised of risk and benefits  Abnormal glucose- AIC today noted  MS-per [facility]  Obesity-still an issue, insurance not wanting to do GLP1  Elevated BP-wt loss should help      High LDL-recheck improved, counseled on diet     5/29 Labs noted and dw patient, counseled on low carb/ncs  and low chol    Current Outpatient Medications:     ABILIFY MAINTENA 400 MG  Suspension Reconstituted ER IM injection ER, , Disp: , Rfl:     benztropine (COGENTIN) 1 MG tablet, Take 1 tablet by mouth 2 (Two) Times a Day., Disp: , Rfl:     diclofenac (CATAFLAM) 50 MG tablet, TAKE 1 TABLET EVERY 8 HOURS AS NEEDED FOR PAIN, Disp: , Rfl: 0    hydrOXYzine (VISTARIL) 25 MG capsule, 3 (Three) Times a Day As Needed., Disp: , Rfl:     Ofatumumab (Kesimpta) 20 MG/0.4ML solution auto-injector, Inject 1 pen under the skin into the appropriate area as directed Every 30 (Thirty) Days., Disp: , Rfl:     omeprazole (priLOSEC) 40 MG capsule, Take 1 capsule by mouth Daily., Disp: 30 capsule, Rfl: 2    ondansetron (ZOFRAN) 4 MG tablet, Take 1 tablet by mouth Every 4 (Four) Hours As Needed for Nausea., Disp: 30 tablet, Rfl: 0    sildenafil (Viagra) 50 MG tablet, Take 1 tablet by mouth Daily As Needed for Erectile Dysfunction., Disp: 15 tablet, Rfl: 2    triamterene-hydrochlorothiazide (MAXZIDE-25) 37.5-25 MG per tablet, Take 1 tablet by mouth Daily As Needed (edema)., Disp: 30 tablet, Rfl: 11        Diagnoses and all orders for this visit:    1. Mixed hyperlipidemia (Primary)  -     Comprehensive Metabolic Panel  -     Lipid Panel  -     TSH    2. Gastroesophageal reflux disease without esophagitis    3. Fatty liver disease, nonalcoholic  -     CBC (No Diff)    4. Bilateral edema of lower extremity    5. Abnormal glucose  -     Hemoglobin A1c

## 2024-05-30 LAB
ALBUMIN SERPL-MCNC: 4.4 G/DL (ref 3.5–5.2)
ALBUMIN/GLOB SERPL: 1.5 G/DL
ALP SERPL-CCNC: 95 U/L (ref 39–117)
ALT SERPL W P-5'-P-CCNC: 51 U/L (ref 1–41)
ANION GAP SERPL CALCULATED.3IONS-SCNC: 14.8 MMOL/L (ref 5–15)
AST SERPL-CCNC: 30 U/L (ref 1–40)
BILIRUB SERPL-MCNC: 1 MG/DL (ref 0–1.2)
BUN SERPL-MCNC: 14 MG/DL (ref 6–20)
BUN/CREAT SERPL: 15.7 (ref 7–25)
CALCIUM SPEC-SCNC: 9.8 MG/DL (ref 8.6–10.5)
CHLORIDE SERPL-SCNC: 98 MMOL/L (ref 98–107)
CHOLEST SERPL-MCNC: 179 MG/DL (ref 0–200)
CO2 SERPL-SCNC: 25.2 MMOL/L (ref 22–29)
CREAT SERPL-MCNC: 0.89 MG/DL (ref 0.76–1.27)
EGFRCR SERPLBLD CKD-EPI 2021: 110.4 ML/MIN/1.73
GLOBULIN UR ELPH-MCNC: 2.9 GM/DL
GLUCOSE SERPL-MCNC: 90 MG/DL (ref 65–99)
HBA1C MFR BLD: 6 % (ref 4.8–5.6)
HDLC SERPL-MCNC: 43 MG/DL (ref 40–60)
LDLC SERPL CALC-MCNC: 115 MG/DL (ref 0–100)
LDLC/HDLC SERPL: 2.62 {RATIO}
POTASSIUM SERPL-SCNC: 4.3 MMOL/L (ref 3.5–5.2)
PROT SERPL-MCNC: 7.3 G/DL (ref 6–8.5)
SODIUM SERPL-SCNC: 138 MMOL/L (ref 136–145)
TRIGL SERPL-MCNC: 117 MG/DL (ref 0–150)
VLDLC SERPL-MCNC: 21 MG/DL (ref 5–40)

## 2024-07-09 ENCOUNTER — OFFICE VISIT (OUTPATIENT)
Dept: INTERNAL MEDICINE | Facility: CLINIC | Age: 39
End: 2024-07-09
Payer: MEDICAID

## 2024-07-09 VITALS
BODY MASS INDEX: 39.22 KG/M2 | DIASTOLIC BLOOD PRESSURE: 80 MMHG | WEIGHT: 274 LBS | HEART RATE: 99 BPM | SYSTOLIC BLOOD PRESSURE: 124 MMHG | HEIGHT: 70 IN | OXYGEN SATURATION: 98 %

## 2024-07-09 DIAGNOSIS — E78.2 MIXED HYPERLIPIDEMIA: Primary | ICD-10-CM

## 2024-07-09 DIAGNOSIS — F31.0 BIPOLAR AFFECTIVE DISORDER, CURRENT EPISODE HYPOMANIC: ICD-10-CM

## 2024-07-09 DIAGNOSIS — K21.9 GASTROESOPHAGEAL REFLUX DISEASE WITHOUT ESOPHAGITIS: ICD-10-CM

## 2024-07-09 DIAGNOSIS — R60.0 BILATERAL EDEMA OF LOWER EXTREMITY: ICD-10-CM

## 2024-07-09 DIAGNOSIS — K76.0 FATTY LIVER DISEASE, NONALCOHOLIC: ICD-10-CM

## 2024-07-09 PROCEDURE — 99214 OFFICE O/P EST MOD 30 MIN: CPT | Performed by: INTERNAL MEDICINE

## 2024-07-09 PROCEDURE — 1160F RVW MEDS BY RX/DR IN RCRD: CPT | Performed by: INTERNAL MEDICINE

## 2024-07-09 PROCEDURE — 1159F MED LIST DOCD IN RCRD: CPT | Performed by: INTERNAL MEDICINE

## 2024-07-09 NOTE — PROGRESS NOTES
Patient is a 39 y.o. male who is here for a follow up of hyperlipidemia.  Chief Complaint   Patient presents with    Hyperlipidemia         HPI:    Here for mgmt of GERD and FLD.  Difficulty losing weight.  No abdominal pains.  Energy level is good.  Exercising on regular basis.  No fever or chills.  Sleeping well.  No nausea or emesis.     History:     Patient Active Problem List   Diagnosis    Anxiety    Bipolar affective disorder    Depression    Gastroesophageal reflux disease without esophagitis    Knee pain    Schizophrenia    Bilateral edema of lower extremity    Abnormal EKG    Fatty liver disease, nonalcoholic    Mixed hyperlipidemia    MS (multiple sclerosis)    Morbid (severe) obesity due to excess calories    Rectal bleeding    Non-recurrent acute suppurative otitis media of left ear without spontaneous rupture of tympanic membrane       Past Medical History:   Diagnosis Date    Abnormal ECG     MS (multiple sclerosis)     Orchitis     Plantar fasciitis        Past Surgical History:   Procedure Laterality Date    LEG SURGERY  05/03/2013    Right Knee       Current Outpatient Medications on File Prior to Visit   Medication Sig    ABILIFY MAINTENA 400 MG Suspension Reconstituted ER IM injection ER     benztropine (COGENTIN) 1 MG tablet Take 1 tablet by mouth 2 (Two) Times a Day.    diclofenac (CATAFLAM) 50 MG tablet TAKE 1 TABLET EVERY 8 HOURS AS NEEDED FOR PAIN    hydrOXYzine (VISTARIL) 25 MG capsule 3 (Three) Times a Day As Needed.    Ofatumumab (Kesimpta) 20 MG/0.4ML solution auto-injector Inject 1 pen under the skin into the appropriate area as directed Every 30 (Thirty) Days.    omeprazole (priLOSEC) 40 MG capsule Take 1 capsule by mouth Daily.    ondansetron (ZOFRAN) 4 MG tablet Take 1 tablet by mouth Every 4 (Four) Hours As Needed for Nausea.    sildenafil (Viagra) 50 MG tablet Take 1 tablet by mouth Daily As Needed for Erectile Dysfunction.    triamterene-hydrochlorothiazide (MAXZIDE-25) 37.5-25 MG  "per tablet Take 1 tablet by mouth Daily As Needed (edema).     No current facility-administered medications on file prior to visit.       Family History   Problem Relation Age of Onset    Diabetes Mother     Hypertension Mother     Atrial fibrillation Father     Colon cancer Sister        Social History     Socioeconomic History    Marital status: Single   Tobacco Use    Smoking status: Never    Smokeless tobacco: Never   Vaping Use    Vaping status: Never Used   Substance and Sexual Activity    Alcohol use: Never    Drug use: Never    Sexual activity: Defer         Review of Systems   Constitutional:  Negative for chills, diaphoresis and fever.   HENT:  Negative for congestion, ear pain, hearing loss, nosebleeds, postnasal drip, sinus pressure and sore throat.    Eyes:  Positive for visual disturbance (better). Negative for pain, discharge and itching.   Respiratory:  Negative for chest tightness, shortness of breath and wheezing.    Cardiovascular:  Negative for chest pain, palpitations and leg swelling.   Gastrointestinal:  Negative for abdominal distention, abdominal pain, diarrhea, nausea and vomiting.        10/15 colonoscopy normal   Endocrine: Negative for polydipsia and polyuria.   Genitourinary:  Negative for difficulty urinating, dysuria, frequency and hematuria.   Musculoskeletal:  Positive for arthralgias. Negative for gait problem and myalgias.        Knee pain on R>L   Skin:  Negative for rash and wound.   Neurological:  Negative for dizziness.   Psychiatric/Behavioral:  Positive for behavioral problems, decreased concentration and dysphoric mood.        /80 (BP Location: Left arm, Patient Position: Sitting)   Pulse 99   Ht 177.8 cm (70\")   Wt 124 kg (274 lb)   SpO2 98%   BMI 39.31 kg/m²       Physical Exam  Constitutional:       Appearance: Normal appearance. He is well-developed. He is obese.   HENT:      Head: Normocephalic and atraumatic.      Right Ear: External ear normal.      Left " Ear: External ear normal.      Nose: Nose normal.      Mouth/Throat:      Mouth: Mucous membranes are moist.      Pharynx: Oropharynx is clear.   Eyes:      Extraocular Movements: Extraocular movements intact.      Conjunctiva/sclera: Conjunctivae normal.      Pupils: Pupils are equal, round, and reactive to light.   Cardiovascular:      Rate and Rhythm: Normal rate and regular rhythm.      Heart sounds: Normal heart sounds.   Pulmonary:      Effort: Pulmonary effort is normal.      Breath sounds: Normal breath sounds.   Abdominal:      General: Bowel sounds are normal.      Palpations: Abdomen is soft.   Musculoskeletal:         General: Normal range of motion.      Cervical back: Normal range of motion and neck supple.      Comments: Bilateral knee crepitus   Lymphadenopathy:      Cervical: No cervical adenopathy.   Skin:     General: Skin is warm and dry.   Neurological:      General: No focal deficit present.      Mental Status: He is alert and oriented to person, place, and time.   Psychiatric:         Mood and Affect: Mood normal.         Behavior: Behavior normal.         Thought Content: Thought content normal.         Procedure:      Discussion/Summary:    knee pain-diclofenac prn, stable  gerd-stable on PPI  bipolor/schizophrenia-/depresson-advised compliance with meds , controlled  constipation-colace bid, increase fiber, not an issue  Edema-cont maxzide prn, stable  Abnormal lft/?fld-work up noted, recheck noted, advised wt loss  FH of colon ca/rectal bleeding-colonoscopy on 10/23 planned but her never recd the prep, he wants to delay, advised of risk and benefits  Abnormal glucose- AIC noted  MS-per [facility]  Obesity-still an issue, insurance not wanting to do GLP1  Elevated BP-wt loss should help      High LDL-recheck improved, counseled on diet     5/29 Labs noted and dw patient, counseled on low carb/ncs  and low chol    Current Outpatient Medications:     ABILIFY MAINTENA 400 MG Suspension  Reconstituted ER IM injection ER, , Disp: , Rfl:     benztropine (COGENTIN) 1 MG tablet, Take 1 tablet by mouth 2 (Two) Times a Day., Disp: , Rfl:     diclofenac (CATAFLAM) 50 MG tablet, TAKE 1 TABLET EVERY 8 HOURS AS NEEDED FOR PAIN, Disp: , Rfl: 0    hydrOXYzine (VISTARIL) 25 MG capsule, 3 (Three) Times a Day As Needed., Disp: , Rfl:     Ofatumumab (Kesimpta) 20 MG/0.4ML solution auto-injector, Inject 1 pen under the skin into the appropriate area as directed Every 30 (Thirty) Days., Disp: , Rfl:     omeprazole (priLOSEC) 40 MG capsule, Take 1 capsule by mouth Daily., Disp: 30 capsule, Rfl: 2    ondansetron (ZOFRAN) 4 MG tablet, Take 1 tablet by mouth Every 4 (Four) Hours As Needed for Nausea., Disp: 30 tablet, Rfl: 0    sildenafil (Viagra) 50 MG tablet, Take 1 tablet by mouth Daily As Needed for Erectile Dysfunction., Disp: 15 tablet, Rfl: 2    triamterene-hydrochlorothiazide (MAXZIDE-25) 37.5-25 MG per tablet, Take 1 tablet by mouth Daily As Needed (edema)., Disp: 30 tablet, Rfl: 11        Diagnoses and all orders for this visit:    1. Mixed hyperlipidemia (Primary)    2. Gastroesophageal reflux disease without esophagitis    3. Fatty liver disease, nonalcoholic    4. Bipolar affective disorder, current episode hypomanic    5. Bilateral edema of lower extremity

## 2024-07-11 ENCOUNTER — TELEPHONE (OUTPATIENT)
Dept: INTERNAL MEDICINE | Facility: CLINIC | Age: 39
End: 2024-07-11
Payer: MEDICAID

## 2024-07-11 DIAGNOSIS — E78.2 MIXED HYPERLIPIDEMIA: Primary | ICD-10-CM

## 2024-07-11 NOTE — TELEPHONE ENCOUNTER
Caller: Geremias Serrano    Relationship: Self    Best call back number: [phone]     What is the best time to reach you: ANYTIME     Who are you requesting to speak with (clinical staff, provider,  specific staff member): PROVIDER    Do you know the name of the person who called: NA    What was the call regarding: PATIENT WOULD LIKE A CALL BACK TO DISCUSS HYPERLIPIDEMA FURTHER    Is it okay if the provider responds through MyChart: NO

## 2024-08-08 ENCOUNTER — HOSPITAL ENCOUNTER (OUTPATIENT)
Dept: NUTRITION | Facility: HOSPITAL | Age: 39
Setting detail: RECURRING SERIES
Discharge: HOME | End: 2024-08-08

## 2024-08-08 PROCEDURE — 97802 MEDICAL NUTRITION INDIV IN: CPT

## 2024-08-08 NOTE — CONSULTS
[facility]          Initial 60 Minute Nutrition Visit    Date: 2024   Patient Name: Geremias Serrano  : 1985   MRN: [id number]   Referring Provider: Feliberto Cruz MD    Reason for Visit: HLD  Visit Format: In person    Nutrition Assessment       Social History:   Social History     Socioeconomic History    Marital status: Single   Tobacco Use    Smoking status: Never    Smokeless tobacco: Never   Vaping Use    Vaping status: Never Used   Substance and Sexual Activity    Alcohol use: Never    Drug use: Never    Sexual activity: Defer     Active Problem List:   Patient Active Problem List    Diagnosis     Non-recurrent acute suppurative otitis media of left ear without spontaneous rupture of tympanic membrane [H66.002]     Rectal bleeding [K62.5]     Morbid (severe) obesity due to excess calories [E66.01]     MS (multiple sclerosis) [G35]     Mixed hyperlipidemia [E78.2]     Fatty liver disease, nonalcoholic [K76.0]     Abnormal EKG [R94.31]     Bilateral edema of lower extremity [R60.0]     Anxiety [F41.9]     Bipolar affective disorder [F31.9]     Depression [F32.A]     Gastroesophageal reflux disease without esophagitis [K21.9]     Knee pain [M25.569]     Schizophrenia [F20.9]       Current Medications:   Current Outpatient Medications:     ABILIFY MAINTENA 400 MG Suspension Reconstituted ER IM injection ER, , Disp: , Rfl:     benztropine (COGENTIN) 1 MG tablet, Take 1 tablet by mouth 2 (Two) Times a Day., Disp: , Rfl:     diclofenac (CATAFLAM) 50 MG tablet, TAKE 1 TABLET EVERY 8 HOURS AS NEEDED FOR PAIN, Disp: , Rfl: 0    hydrOXYzine (VISTARIL) 25 MG capsule, 3 (Three) Times a Day As Needed., Disp: , Rfl:     Ofatumumab (Kesimpta) 20 MG/0.4ML solution auto-injector, Inject 1 pen under the skin into the appropriate area as directed Every 30 (Thirty) Days., Disp: , Rfl:     omeprazole (priLOSEC) 40 MG capsule, Take 1 capsule by mouth Daily., Disp: 30 capsule, Rfl:  2    ondansetron (ZOFRAN) 4 MG tablet, Take 1 tablet by mouth Every 4 (Four) Hours As Needed for Nausea., Disp: 30 tablet, Rfl: 0    sildenafil (Viagra) 50 MG tablet, Take 1 tablet by mouth Daily As Needed for Erectile Dysfunction., Disp: 15 tablet, Rfl: 2    triamterene-hydrochlorothiazide (MAXZIDE-25) 37.5-25 MG per tablet, Take 1 tablet by mouth Daily As Needed (edema)., Disp: 30 tablet, Rfl: 11    Recent Pertinent Labs: HgbA1c 6.0%, , ALT 51    Hunger Vital Sign Food Insecurity Assessment:  Within the past 12 months I/we worried whether our food would run out before I/we got money to buy more: No   Within the past 12 months the food I/we bought just didn't last and I/we didn't have money to get more: No   Use of food assistance programs (WIC, food stamps, food [place]) No       Food & Nutrition Related History       Food Allergies: none  Food Intolerances: eggs (okay in things)  Food Behavior: Patient reports he eats 3 meals per day and 2 snacks. He also overeats at most meals and is always hungry  Nutrition Impact Symptoms: none  Gastrointestinal conditions that impact intake or food choices: GERD  Details at home: Lives alone  Who prepares most meals: patient  Who does grocery shopping: patient  How many meals are purchased from fast food/sit down restaurants per week: unknown  Difficulty chewin - Normal  Difficulty swallowin - Normal  Diet requirement related to personal preference or cultural belief: no pork  History of eating disorder/disordered eating habits: None  Language/communication details: English  Barriers to learning: None    24 Hour Recall:   Time Food/beverages consumed   Breakfast Bagel with cheese and turkey slices, 2 bags chips   Snack Granola bar   Lunch [place] with chips   Snack Granola bar   Dinner Rice and beans with chicken, beef, or fish   beverages Soda, vitamin water, gatorade, juice             Additional comments: Patient consumes a diet high in added sugar, CHO, sat  "fat    Anthropometrics      Height:   Ht Readings from Last 1 Encounters:   07/09/24 177.8 cm (70\")     Weight:   Wt Readings from Last 3 Encounters:   07/09/24 124 kg (274 lb)   05/29/24 122 kg (269 lb)   01/30/24 122 kg (270 lb)     BMI: There is no height or weight on file to calculate BMI.   Weight Change: none noted     Physical Activity     Barriers to physical activity: knee pain     Physical activity comments: patient goes to the gym 3x/week     Estimated Needs     Estimated Energy Needs: Did not calculate for this appt    Estimated Protein Needs: 125-150g/day 1.0-1.2 g/kg (increased due to physical activity); starting goal of 25 minimum per meal    Estimated Fluid Needs: At least 64 oz/day     Discussion / Education      Geremias Serrano is a 39 y.o. male who has been referred for HLD. His primary motivation is to reduce risk of chronic disease/long term complications, reduce need for medications, and improve symptoms. His primary barriers to change are portion control, snacking, emotional eating, overcoming cravings, and lack of nutrition knowledge. He lives alone. Cooking and grocery shopping are done by patient. He dines out several times per week. He has not previously seen a dietitian/nutritionist. Patient attended the appt with his father, who has diabetes. They asked about injectable medication for weight loss and RD explained that medication is managed by PCP and that RD can provide support if medication is pursued. Patient does not eat pork. He does not like most veg, fruit. He does like corn, beans, apples, raisins.     RD provided education about the three macronutrients and the roles of each for promoting good health and balance. Patient was able to identify several foods in each category that they consume regularly. RD emphasized the importance of incorporating a variety of sources for each, as well as increasing fiber rich choices such as whole grains and vegetables/fruits to improve blood " "sugar, cholesterol, and satiety. RD reviewed the different types of dietary fat and the roles of each in managing cholesterol and reducing risks of heart disease and other complications. RD provided a visual of a \"balanced meal\" with adequate portions of carbohydrates, protein, and fruits/vegetables. Patient compared the visual to their own meals and was able to identify some areas for improved balance and portion sizes. RD also discussed the importance of eating a protein and fiber rich breakfast to improve energy levels and blood glucose throughout the rest of the day.    RD and patient worked to set several goals for patient. RD provided contact info and encouraged patient to reach out with any additional questions or concerns following the appointment.    Assessment of patient engagement: Engaged    Measurement of understanding: Patient verbalized understanding, Patient able to demonstrate understanding with teach back     Resources Provided:  Weight management packet  Protein supplement packet    Goal (s)      Goal 1: Starting goal of 25-30g protein per meal, eventually up to 125-150g/day     Goal 2: Pair protein and CHO at all meals and snacks     Goal 3: Switch to sugar free beverage option.     Plan of Care     PES Statement:   Overweight / Obesity related to diet and lifestye as evidenced by BMI.     Follow Up Visit      Follow Up scheduled for 9/10 @ 1:15 pm    Total of 60 minutes spent with patient on nutrition counseling. Education based on Academy of Nutrition and Dietetics guidelines. Patient was provided with RD's contact information. Thank you for this referral.      "

## 2024-08-27 ENCOUNTER — OFFICE VISIT (OUTPATIENT)
Dept: INTERNAL MEDICINE | Facility: CLINIC | Age: 39
End: 2024-08-27
Payer: MEDICAID

## 2024-08-27 VITALS
BODY MASS INDEX: 38.8 KG/M2 | SYSTOLIC BLOOD PRESSURE: 120 MMHG | HEART RATE: 74 BPM | WEIGHT: 271 LBS | OXYGEN SATURATION: 98 % | HEIGHT: 70 IN | DIASTOLIC BLOOD PRESSURE: 78 MMHG

## 2024-08-27 DIAGNOSIS — K21.9 GASTROESOPHAGEAL REFLUX DISEASE WITHOUT ESOPHAGITIS: ICD-10-CM

## 2024-08-27 DIAGNOSIS — G35 MS (MULTIPLE SCLEROSIS): ICD-10-CM

## 2024-08-27 DIAGNOSIS — K76.0 FATTY LIVER DISEASE, NONALCOHOLIC: ICD-10-CM

## 2024-08-27 DIAGNOSIS — R60.0 BILATERAL EDEMA OF LOWER EXTREMITY: ICD-10-CM

## 2024-08-27 DIAGNOSIS — F31.0 BIPOLAR AFFECTIVE DISORDER, CURRENT EPISODE HYPOMANIC: ICD-10-CM

## 2024-08-27 DIAGNOSIS — E78.2 MIXED HYPERLIPIDEMIA: Primary | ICD-10-CM

## 2024-08-27 DIAGNOSIS — E66.01 MORBID (SEVERE) OBESITY DUE TO EXCESS CALORIES: ICD-10-CM

## 2024-08-27 PROCEDURE — 99214 OFFICE O/P EST MOD 30 MIN: CPT | Performed by: INTERNAL MEDICINE

## 2024-08-27 PROCEDURE — 1159F MED LIST DOCD IN RCRD: CPT | Performed by: INTERNAL MEDICINE

## 2024-08-27 PROCEDURE — 1160F RVW MEDS BY RX/DR IN RCRD: CPT | Performed by: INTERNAL MEDICINE

## 2024-08-27 NOTE — PROGRESS NOTES
Patient is a 39 y.o. male who is here for a follow up of hyperlipidemia.  Chief Complaint   Patient presents with    Hyperlipidemia         HPI:    Here for mgmt of hyperlipidemia and GERD.  No abdominal complaints. Appetite is good.  Seen by dietician and making changes to his diet, stopped soda and pizza.  No nausea or emesis.  Has lost 3 pounds since last seen.      History:     Patient Active Problem List   Diagnosis    Anxiety    Bipolar affective disorder    Depression    Gastroesophageal reflux disease without esophagitis    Knee pain    Schizophrenia    Bilateral edema of lower extremity    Abnormal EKG    Fatty liver disease, nonalcoholic    Mixed hyperlipidemia    MS (multiple sclerosis)    Morbid (severe) obesity due to excess calories    Rectal bleeding    Non-recurrent acute suppurative otitis media of left ear without spontaneous rupture of tympanic membrane       Past Medical History:   Diagnosis Date    Abnormal ECG     MS (multiple sclerosis)     Orchitis     Plantar fasciitis        Past Surgical History:   Procedure Laterality Date    LEG SURGERY  05/03/2013    Right Knee       Current Outpatient Medications on File Prior to Visit   Medication Sig    ABILIFY MAINTENA 400 MG Suspension Reconstituted ER IM injection ER     benztropine (COGENTIN) 1 MG tablet Take 1 tablet by mouth 2 (Two) Times a Day.    diclofenac (CATAFLAM) 50 MG tablet TAKE 1 TABLET EVERY 8 HOURS AS NEEDED FOR PAIN    hydrOXYzine (VISTARIL) 25 MG capsule 3 (Three) Times a Day As Needed.    Ofatumumab (Kesimpta) 20 MG/0.4ML solution auto-injector Inject 1 pen under the skin into the appropriate area as directed Every 30 (Thirty) Days.    omeprazole (priLOSEC) 40 MG capsule Take 1 capsule by mouth Daily.    ondansetron (ZOFRAN) 4 MG tablet Take 1 tablet by mouth Every 4 (Four) Hours As Needed for Nausea.    sildenafil (Viagra) 50 MG tablet Take 1 tablet by mouth Daily As Needed for Erectile Dysfunction.     "triamterene-hydrochlorothiazide (MAXZIDE-25) 37.5-25 MG per tablet Take 1 tablet by mouth Daily As Needed (edema).     No current facility-administered medications on file prior to visit.       Family History   Problem Relation Age of Onset    Diabetes Mother     Hypertension Mother     Atrial fibrillation Father     Colon cancer Sister        Social History     Socioeconomic History    Marital status: Single   Tobacco Use    Smoking status: Never    Smokeless tobacco: Never   Vaping Use    Vaping status: Never Used   Substance and Sexual Activity    Alcohol use: Never    Drug use: Never    Sexual activity: Defer         Review of Systems   Constitutional:  Negative for chills, diaphoresis and fever.   HENT:  Negative for congestion, ear pain, hearing loss, nosebleeds, postnasal drip, sinus pressure and sore throat.    Eyes:  Positive for visual disturbance (better). Negative for pain, discharge and itching.   Respiratory:  Negative for chest tightness, shortness of breath and wheezing.    Cardiovascular:  Negative for chest pain, palpitations and leg swelling.   Gastrointestinal:  Negative for abdominal distention, abdominal pain, diarrhea, nausea and vomiting.        10/15 colonoscopy normal   Endocrine: Negative for polydipsia and polyuria.   Genitourinary:  Negative for difficulty urinating, dysuria, frequency and hematuria.   Musculoskeletal:  Positive for arthralgias. Negative for gait problem and myalgias.        Knee pain on R>L   Skin:  Negative for rash and wound.   Neurological:  Negative for dizziness.   Psychiatric/Behavioral:  Positive for behavioral problems, decreased concentration and dysphoric mood.        /74 (BP Location: Left arm, Patient Position: Sitting)   Pulse 74   Ht 177.8 cm (70\")   Wt 123 kg (271 lb)   SpO2 98%   BMI 38.88 kg/m²       Physical Exam  Constitutional:       Appearance: Normal appearance. He is well-developed. He is obese.   HENT:      Head: Normocephalic and " atraumatic.      Right Ear: External ear normal.      Left Ear: External ear normal.      Nose: Nose normal.      Mouth/Throat:      Mouth: Mucous membranes are moist.      Pharynx: Oropharynx is clear.   Eyes:      Extraocular Movements: Extraocular movements intact.      Conjunctiva/sclera: Conjunctivae normal.      Pupils: Pupils are equal, round, and reactive to light.   Cardiovascular:      Rate and Rhythm: Normal rate and regular rhythm.      Heart sounds: Normal heart sounds.   Pulmonary:      Effort: Pulmonary effort is normal.      Breath sounds: Normal breath sounds.   Abdominal:      General: Bowel sounds are normal.      Palpations: Abdomen is soft.   Musculoskeletal:         General: Normal range of motion.      Cervical back: Normal range of motion and neck supple.      Comments: Bilateral knee crepitus   Lymphadenopathy:      Cervical: No cervical adenopathy.   Skin:     General: Skin is warm and dry.   Neurological:      General: No focal deficit present.      Mental Status: He is alert and oriented to person, place, and time.   Psychiatric:         Mood and Affect: Mood normal.         Behavior: Behavior normal.         Thought Content: Thought content normal.         Procedure:      Discussion/Summary:    knee pain-diclofenac prn, stable  gerd-stable on PPI  bipolor/schizophrenia-/depresson-advised compliance with meds , controlled  constipation-colace bid, increase fiber, not an issue  Edema-cont maxzide prn, stable  Abnormal lft/?fld-work up noted, recheck noted, advised wt loss  FH of colon ca/rectal bleeding-colonoscopy on 10/23 planned but her never recd the prep, he wants to delay, advised of risk and benefits  Abnormal glucose- AIC noted  MS-per [facility]  Obesity-still an issue, insurance not wanting to do GLP1  Elevated BP-wt loss should help      High LDL-recheck improved, counseled on diet     5/29 Labs noted and dw patient, counseled on low carb/ncs  and low chol    Current  Outpatient Medications:     ABILIFY MAINTENA 400 MG Suspension Reconstituted ER IM injection ER, , Disp: , Rfl:     benztropine (COGENTIN) 1 MG tablet, Take 1 tablet by mouth 2 (Two) Times a Day., Disp: , Rfl:     diclofenac (CATAFLAM) 50 MG tablet, TAKE 1 TABLET EVERY 8 HOURS AS NEEDED FOR PAIN, Disp: , Rfl: 0    hydrOXYzine (VISTARIL) 25 MG capsule, 3 (Three) Times a Day As Needed., Disp: , Rfl:     Ofatumumab (Kesimpta) 20 MG/0.4ML solution auto-injector, Inject 1 pen under the skin into the appropriate area as directed Every 30 (Thirty) Days., Disp: , Rfl:     omeprazole (priLOSEC) 40 MG capsule, Take 1 capsule by mouth Daily., Disp: 30 capsule, Rfl: 2    ondansetron (ZOFRAN) 4 MG tablet, Take 1 tablet by mouth Every 4 (Four) Hours As Needed for Nausea., Disp: 30 tablet, Rfl: 0    sildenafil (Viagra) 50 MG tablet, Take 1 tablet by mouth Daily As Needed for Erectile Dysfunction., Disp: 15 tablet, Rfl: 2    triamterene-hydrochlorothiazide (MAXZIDE-25) 37.5-25 MG per tablet, Take 1 tablet by mouth Daily As Needed (edema)., Disp: 30 tablet, Rfl: 11        Diagnoses and all orders for this visit:    1. Mixed hyperlipidemia (Primary)    2. Morbid (severe) obesity due to excess calories    3. Gastroesophageal reflux disease without esophagitis    4. Fatty liver disease, nonalcoholic    5. Bipolar affective disorder, current episode hypomanic    6. MS (multiple sclerosis)    7. Bilateral edema of lower extremity

## 2024-09-10 ENCOUNTER — HOSPITAL ENCOUNTER (OUTPATIENT)
Dept: NUTRITION | Facility: HOSPITAL | Age: 39
Setting detail: RECURRING SERIES
Discharge: HOME | End: 2024-09-10

## 2024-09-10 NOTE — PROGRESS NOTES
[facility] Nutrition Services          Free 30 Minute Nutrition Follow Up     Date: 09/10/2024   Patient Name: Geremias Serrano  : 1985   MRN: [id number]   Referring Provider: Feliberto Cruz MD    Reason for Visit: HLD  Visit Format: In person    Discussion / Education      Patient was seen today for follow up related to HLD. Patient has no new concerns for chewing or swallowing difficulties. Patient has no new concerns for gastrointestinal problems. Patient has no new concerns for food insecurity. Patient has made some changes: cut out soda, cut back on fast food. He has not significantly increased protein and had some difficulty understanding what a protein food entailed. RD reviewed protein foods and helped patient come up with ideas for proteins to eat at meals and snacks, as well as post workout. Patient also called his mother because she helps him to prepare meals/shop at times, and RD encouraged her to help remind patient to add protein to meals and snacks.    Previous goals included:    Goal 1: Starting goal of 25-30g protein per meal, eventually up to 125-150g/day   Goal 2: Pair protein and CHO at all meals and snacks   Goal 3: Switch to sugar free beverage option.     Patient has made some progress on his goals.    1. Patient has not significantly increased protein.  2. See above.  3. Patient has switched to [place] drink flavoring instead of SSB.    Patient does not desire continued follow up at this time. RD provided contact info and encouraged patient to reach out as needed for support.    Assessment of patient engagement: Engaged    Measurement of understanding: Patient verbalized understanding, Patient able to demonstrate understanding with teach back     Resources Provided:  High protein recipe book    Goal (s)      Goal 1: 30-40g protein with meals.  Goal 2: Pair Pro and CHO at all meals and snacks.      Follow Up Visit      Follow Up not scheduled at this time     Total of 30  minutes spent with patient on nutrition counseling. Education based on Academy of Nutrition and Dietetics guidelines. Patient was provided with RD's contact information. Thank you for this referral.

## 2024-11-14 ENCOUNTER — LAB (OUTPATIENT)
Dept: INTERNAL MEDICINE | Facility: CLINIC | Age: 39
End: 2024-11-14
Payer: MEDICAID

## 2024-11-14 ENCOUNTER — OFFICE VISIT (OUTPATIENT)
Dept: INTERNAL MEDICINE | Facility: CLINIC | Age: 39
End: 2024-11-14
Payer: MEDICAID

## 2024-11-14 VITALS
HEIGHT: 70 IN | BODY MASS INDEX: 39.22 KG/M2 | SYSTOLIC BLOOD PRESSURE: 120 MMHG | DIASTOLIC BLOOD PRESSURE: 74 MMHG | WEIGHT: 274 LBS | OXYGEN SATURATION: 97 % | HEART RATE: 79 BPM

## 2024-11-14 DIAGNOSIS — R73.09 ABNORMAL GLUCOSE: ICD-10-CM

## 2024-11-14 DIAGNOSIS — K76.0 FATTY LIVER DISEASE, NONALCOHOLIC: ICD-10-CM

## 2024-11-14 DIAGNOSIS — G35 MS (MULTIPLE SCLEROSIS): ICD-10-CM

## 2024-11-14 DIAGNOSIS — E66.01 MORBID (SEVERE) OBESITY DUE TO EXCESS CALORIES: ICD-10-CM

## 2024-11-14 DIAGNOSIS — K21.9 GASTROESOPHAGEAL REFLUX DISEASE WITHOUT ESOPHAGITIS: ICD-10-CM

## 2024-11-14 DIAGNOSIS — F31.0 BIPOLAR AFFECTIVE DISORDER, CURRENT EPISODE HYPOMANIC: ICD-10-CM

## 2024-11-14 DIAGNOSIS — E78.2 MIXED HYPERLIPIDEMIA: Primary | ICD-10-CM

## 2024-11-14 LAB
ALBUMIN SERPL-MCNC: 4.1 G/DL (ref 3.5–5.2)
ALBUMIN/GLOB SERPL: 1.2 G/DL
ALP SERPL-CCNC: 95 U/L (ref 39–117)
ALT SERPL W P-5'-P-CCNC: 52 U/L (ref 1–41)
ANION GAP SERPL CALCULATED.3IONS-SCNC: 11.6 MMOL/L (ref 5–15)
AST SERPL-CCNC: 30 U/L (ref 1–40)
BILIRUB SERPL-MCNC: 0.7 MG/DL (ref 0–1.2)
BUN SERPL-MCNC: 15 MG/DL (ref 6–20)
BUN/CREAT SERPL: 15.6 (ref 7–25)
CALCIUM SPEC-SCNC: 9.7 MG/DL (ref 8.6–10.5)
CHLORIDE SERPL-SCNC: 101 MMOL/L (ref 98–107)
CHOLEST SERPL-MCNC: 199 MG/DL (ref 0–200)
CO2 SERPL-SCNC: 26.4 MMOL/L (ref 22–29)
CREAT SERPL-MCNC: 0.96 MG/DL (ref 0.76–1.27)
EGFRCR SERPLBLD CKD-EPI 2021: 101.8 ML/MIN/1.73
GLOBULIN UR ELPH-MCNC: 3.3 GM/DL
GLUCOSE SERPL-MCNC: 103 MG/DL (ref 65–99)
HBA1C MFR BLD: 6 % (ref 4.8–5.6)
HDLC SERPL-MCNC: 43 MG/DL (ref 40–60)
LDLC SERPL CALC-MCNC: 133 MG/DL (ref 0–100)
LDLC/HDLC SERPL: 3.02 {RATIO}
POTASSIUM SERPL-SCNC: 4.4 MMOL/L (ref 3.5–5.2)
PROT SERPL-MCNC: 7.4 G/DL (ref 6–8.5)
SODIUM SERPL-SCNC: 139 MMOL/L (ref 136–145)
TRIGL SERPL-MCNC: 130 MG/DL (ref 0–150)
TSH SERPL DL<=0.05 MIU/L-ACNC: 3.76 UIU/ML (ref 0.27–4.2)
VLDLC SERPL-MCNC: 23 MG/DL (ref 5–40)

## 2024-11-14 PROCEDURE — 84443 ASSAY THYROID STIM HORMONE: CPT | Performed by: INTERNAL MEDICINE

## 2024-11-14 PROCEDURE — 80053 COMPREHEN METABOLIC PANEL: CPT | Performed by: INTERNAL MEDICINE

## 2024-11-14 PROCEDURE — 99214 OFFICE O/P EST MOD 30 MIN: CPT | Performed by: INTERNAL MEDICINE

## 2024-11-14 PROCEDURE — 1160F RVW MEDS BY RX/DR IN RCRD: CPT | Performed by: INTERNAL MEDICINE

## 2024-11-14 PROCEDURE — 36415 COLL VENOUS BLD VENIPUNCTURE: CPT | Performed by: INTERNAL MEDICINE

## 2024-11-14 PROCEDURE — 80061 LIPID PANEL: CPT | Performed by: INTERNAL MEDICINE

## 2024-11-14 PROCEDURE — 1159F MED LIST DOCD IN RCRD: CPT | Performed by: INTERNAL MEDICINE

## 2024-11-14 PROCEDURE — 83036 HEMOGLOBIN GLYCOSYLATED A1C: CPT | Performed by: INTERNAL MEDICINE

## 2024-11-14 RX ORDER — PRAVASTATIN SODIUM 40 MG
40 TABLET ORAL NIGHTLY
Qty: 90 TABLET | Refills: 3 | Status: SHIPPED | OUTPATIENT
Start: 2024-11-14

## 2024-11-14 NOTE — PROGRESS NOTES
Patient is a 39 y.o. male who is here for a follow up of hyperlipidemia.  Chief Complaint   Patient presents with    Hyperlipidemia         HPI:    Here for mgmt of GERD and abnormal glucose.  Trying to loose weight.  His joints are stiff.  Occasional HAs.  No abdominal pains.  Sleeping well.  No fever or chills.      History:     Patient Active Problem List   Diagnosis    Anxiety    Bipolar affective disorder    Depression    Gastroesophageal reflux disease without esophagitis    Knee pain    Schizophrenia    Bilateral edema of lower extremity    Abnormal EKG    Fatty liver disease, nonalcoholic    Mixed hyperlipidemia    MS (multiple sclerosis)    Morbid (severe) obesity due to excess calories    Rectal bleeding    Non-recurrent acute suppurative otitis media of left ear without spontaneous rupture of tympanic membrane       Past Medical History:   Diagnosis Date    Abnormal ECG     MS (multiple sclerosis)     Orchitis     Plantar fasciitis        Past Surgical History:   Procedure Laterality Date    LEG SURGERY  05/03/2013    Right Knee       Current Outpatient Medications on File Prior to Visit   Medication Sig    ABILIFY MAINTENA 400 MG Suspension Reconstituted ER IM injection ER     benztropine (COGENTIN) 1 MG tablet Take 1 tablet by mouth 2 (Two) Times a Day.    diclofenac (CATAFLAM) 50 MG tablet TAKE 1 TABLET EVERY 8 HOURS AS NEEDED FOR PAIN    hydrOXYzine (VISTARIL) 25 MG capsule 3 (Three) Times a Day As Needed.    Ofatumumab (Kesimpta) 20 MG/0.4ML solution auto-injector Inject 1 pen under the skin into the appropriate area as directed Every 30 (Thirty) Days.    omeprazole (priLOSEC) 40 MG capsule Take 1 capsule by mouth Daily.    ondansetron (ZOFRAN) 4 MG tablet Take 1 tablet by mouth Every 4 (Four) Hours As Needed for Nausea.    sildenafil (Viagra) 50 MG tablet Take 1 tablet by mouth Daily As Needed for Erectile Dysfunction.    triamterene-hydrochlorothiazide (MAXZIDE-25) 37.5-25 MG per tablet Take 1  "tablet by mouth Daily As Needed (edema).     No current facility-administered medications on file prior to visit.       Family History   Problem Relation Age of Onset    Diabetes Mother     Hypertension Mother     Atrial fibrillation Father     Colon cancer Sister        Social History     Socioeconomic History    Marital status: Single   Tobacco Use    Smoking status: Never    Smokeless tobacco: Never   Vaping Use    Vaping status: Never Used   Substance and Sexual Activity    Alcohol use: Never    Drug use: Never    Sexual activity: Defer         Review of Systems   Constitutional:  Negative for chills, diaphoresis and fever.   HENT:  Negative for congestion, ear pain, hearing loss, nosebleeds, postnasal drip, sinus pressure and sore throat.    Eyes:  Positive for visual disturbance (better). Negative for pain, discharge and itching.   Respiratory:  Negative for chest tightness, shortness of breath and wheezing.    Cardiovascular:  Negative for chest pain, palpitations and leg swelling.   Gastrointestinal:  Negative for abdominal distention, abdominal pain, diarrhea, nausea and vomiting.        10/15 colonoscopy normal   Endocrine: Negative for polydipsia and polyuria.   Genitourinary:  Negative for difficulty urinating, dysuria, frequency and hematuria.   Musculoskeletal:  Positive for arthralgias. Negative for gait problem and myalgias.        Knee pain on R>L   Skin:  Negative for rash and wound.   Neurological:  Negative for dizziness.   Psychiatric/Behavioral:  Positive for behavioral problems, decreased concentration and dysphoric mood.        /74 (BP Location: Left arm, Patient Position: Sitting)   Pulse 79   Ht 177.8 cm (70\")   Wt 124 kg (274 lb)   SpO2 97%   BMI 39.31 kg/m²       Physical Exam  Constitutional:       Appearance: Normal appearance. He is well-developed. He is obese.   HENT:      Head: Normocephalic and atraumatic.      Right Ear: External ear normal.      Left Ear: External ear " normal.      Nose: Nose normal.      Mouth/Throat:      Mouth: Mucous membranes are moist.      Pharynx: Oropharynx is clear.   Eyes:      Extraocular Movements: Extraocular movements intact.      Conjunctiva/sclera: Conjunctivae normal.      Pupils: Pupils are equal, round, and reactive to light.   Cardiovascular:      Rate and Rhythm: Normal rate and regular rhythm.      Heart sounds: Normal heart sounds.   Pulmonary:      Effort: Pulmonary effort is normal.      Breath sounds: Normal breath sounds.   Abdominal:      General: Bowel sounds are normal.      Palpations: Abdomen is soft.   Musculoskeletal:         General: Normal range of motion.      Cervical back: Normal range of motion and neck supple.      Comments: Bilateral knee crepitus   Lymphadenopathy:      Cervical: No cervical adenopathy.   Skin:     General: Skin is warm and dry.   Neurological:      General: No focal deficit present.      Mental Status: He is alert and oriented to person, place, and time.   Psychiatric:         Mood and Affect: Mood normal.         Behavior: Behavior normal.         Thought Content: Thought content normal.         Procedure:      Historical Data:    knee pain-diclofenac prn, stable  gerd-stable on PPI  bipolor/schizophrenia-/depresson-advised compliance with meds , controlled  constipation-colace bid, increase fiber, not an issue  Edema-cont maxzide prn, stable  Abnormal lft/?fld-work up noted, recheck noted, advised wt loss  FH of colon ca/rectal bleeding-colonoscopy on 10/23 planned but her never recd the prep, he wants to delay, advised of risk and benefits  Abnormal glucose- AIC noted  MS-per [facility]  Obesity-still an issue, insurance not wanting to do GLP1  Elevated BP-wt loss should help      High LDL-recheck noted and will start statin, counseled on diet     11/14 Labs noted and dw patient, counseled on low carb/ncs  and low chol    Current Outpatient Medications:     ABILIFY MAINTENA 400 MG Suspension  Reconstituted ER IM injection ER, , Disp: , Rfl:     benztropine (COGENTIN) 1 MG tablet, Take 1 tablet by mouth 2 (Two) Times a Day., Disp: , Rfl:     diclofenac (CATAFLAM) 50 MG tablet, TAKE 1 TABLET EVERY 8 HOURS AS NEEDED FOR PAIN, Disp: , Rfl: 0    hydrOXYzine (VISTARIL) 25 MG capsule, 3 (Three) Times a Day As Needed., Disp: , Rfl:     Ofatumumab (Kesimpta) 20 MG/0.4ML solution auto-injector, Inject 1 pen under the skin into the appropriate area as directed Every 30 (Thirty) Days., Disp: , Rfl:     omeprazole (priLOSEC) 40 MG capsule, Take 1 capsule by mouth Daily., Disp: 30 capsule, Rfl: 2    ondansetron (ZOFRAN) 4 MG tablet, Take 1 tablet by mouth Every 4 (Four) Hours As Needed for Nausea., Disp: 30 tablet, Rfl: 0    sildenafil (Viagra) 50 MG tablet, Take 1 tablet by mouth Daily As Needed for Erectile Dysfunction., Disp: 15 tablet, Rfl: 2    triamterene-hydrochlorothiazide (MAXZIDE-25) 37.5-25 MG per tablet, Take 1 tablet by mouth Daily As Needed (edema)., Disp: 30 tablet, Rfl: 11    pravastatin (Pravachol) 40 MG tablet, Take 1 tablet by mouth Every Night., Disp: 90 tablet, Rfl: 3        Diagnoses and all orders for this visit:    1. Mixed hyperlipidemia (Primary)  -     Comprehensive Metabolic Panel  -     Lipid Panel  -     TSH  -     pravastatin (Pravachol) 40 MG tablet; Take 1 tablet by mouth Every Night.  Dispense: 90 tablet; Refill: 3    2. Morbid (severe) obesity due to excess calories    3. Gastroesophageal reflux disease without esophagitis    4. Fatty liver disease, nonalcoholic    5. Bipolar affective disorder, current episode hypomanic    6. MS (multiple sclerosis)    7. Abnormal glucose  -     Hemoglobin A1c

## 2025-02-18 DIAGNOSIS — E66.01 MORBID (SEVERE) OBESITY DUE TO EXCESS CALORIES: Primary | ICD-10-CM

## 2025-02-20 ENCOUNTER — TELEPHONE (OUTPATIENT)
Dept: INTERNAL MEDICINE | Facility: CLINIC | Age: 40
End: 2025-02-20

## 2025-02-20 NOTE — TELEPHONE ENCOUNTER
Caller: Geremias Serrano    Relationship to patient: Self      Best call back number: [phone]     Provider: CORALES    Medication PA needed:     Tirzepatide-Weight Management (ZEPBOUND) 2.5 MG/0.5ML solution auto-injector       Reason for call/Prior Auth: INSURANCE REQUIRES [id number]

## 2025-02-21 ENCOUNTER — OFFICE VISIT (OUTPATIENT)
Dept: INTERNAL MEDICINE | Facility: CLINIC | Age: 40
End: 2025-02-21
Payer: MEDICAID

## 2025-02-21 ENCOUNTER — PRIOR AUTHORIZATION (OUTPATIENT)
Dept: INTERNAL MEDICINE | Facility: CLINIC | Age: 40
End: 2025-02-21
Payer: MEDICAID

## 2025-02-21 VITALS
SYSTOLIC BLOOD PRESSURE: 110 MMHG | WEIGHT: 277 LBS | DIASTOLIC BLOOD PRESSURE: 80 MMHG | HEART RATE: 79 BPM | BODY MASS INDEX: 39.65 KG/M2 | OXYGEN SATURATION: 98 % | HEIGHT: 70 IN

## 2025-02-21 DIAGNOSIS — K21.9 GASTROESOPHAGEAL REFLUX DISEASE WITHOUT ESOPHAGITIS: ICD-10-CM

## 2025-02-21 DIAGNOSIS — K76.0 FATTY LIVER DISEASE, NONALCOHOLIC: ICD-10-CM

## 2025-02-21 DIAGNOSIS — R07.9 CHEST PAIN, UNSPECIFIED TYPE: ICD-10-CM

## 2025-02-21 DIAGNOSIS — R73.09 ABNORMAL GLUCOSE: ICD-10-CM

## 2025-02-21 DIAGNOSIS — R06.02 SOB (SHORTNESS OF BREATH): ICD-10-CM

## 2025-02-21 DIAGNOSIS — E66.01 MORBID (SEVERE) OBESITY DUE TO EXCESS CALORIES: Primary | ICD-10-CM

## 2025-02-21 DIAGNOSIS — G35 MS (MULTIPLE SCLEROSIS): ICD-10-CM

## 2025-02-21 DIAGNOSIS — E78.2 MIXED HYPERLIPIDEMIA: Primary | ICD-10-CM

## 2025-02-21 DIAGNOSIS — F31.0 BIPOLAR AFFECTIVE DISORDER, CURRENT EPISODE HYPOMANIC: ICD-10-CM

## 2025-02-21 PROCEDURE — 1159F MED LIST DOCD IN RCRD: CPT | Performed by: INTERNAL MEDICINE

## 2025-02-21 PROCEDURE — 1160F RVW MEDS BY RX/DR IN RCRD: CPT | Performed by: INTERNAL MEDICINE

## 2025-02-21 PROCEDURE — 99214 OFFICE O/P EST MOD 30 MIN: CPT | Performed by: INTERNAL MEDICINE

## 2025-02-21 RX ORDER — SEMAGLUTIDE 0.25 MG/.5ML
0.25 INJECTION, SOLUTION SUBCUTANEOUS
Qty: 2 ML | Refills: 2 | Status: SHIPPED | OUTPATIENT
Start: 2025-02-21

## 2025-02-21 NOTE — TELEPHONE ENCOUNTER
PA for zepbound sent to plan per covermymeds.     The requested drug is considered a benefit exclusion in pursuant to the Social Security Act 1927(d)(2) or it is an excluded benefit defined per the plan coverage exclusion  (Key: [id number])

## 2025-02-21 NOTE — PROGRESS NOTES
Patient is a 39 y.o. male who is here to discuss lab results  Chief Complaint   Patient presents with    Follow-up     Discuss lab results         HPI:    Here for mgmt of cough and sob.  Difficulty losing weight.  Was seen at  recently and his nonfasting glucose was 114.  No abdominal pains.  Also gets HALL and CP.  No abdominal pains.      History:     Patient Active Problem List   Diagnosis    Anxiety    Bipolar affective disorder    Depression    Gastroesophageal reflux disease without esophagitis    Knee pain    Schizophrenia    Bilateral edema of lower extremity    Abnormal EKG    Fatty liver disease, nonalcoholic    Mixed hyperlipidemia    MS (multiple sclerosis)    Morbid (severe) obesity due to excess calories    Rectal bleeding    Non-recurrent acute suppurative otitis media of left ear without spontaneous rupture of tympanic membrane       Past Medical History:   Diagnosis Date    Abnormal ECG     MS (multiple sclerosis)     Orchitis     Plantar fasciitis        Past Surgical History:   Procedure Laterality Date    LEG SURGERY  05/03/2013    Right Knee       Current Outpatient Medications on File Prior to Visit   Medication Sig    ABILIFY MAINTENA 400 MG Suspension Reconstituted ER IM injection ER     benztropine (COGENTIN) 1 MG tablet Take 1 tablet by mouth 2 (Two) Times a Day.    diclofenac (CATAFLAM) 50 MG tablet TAKE 1 TABLET EVERY 8 HOURS AS NEEDED FOR PAIN    hydrOXYzine (VISTARIL) 25 MG capsule 3 (Three) Times a Day As Needed.    Ofatumumab (Kesimpta) 20 MG/0.4ML solution auto-injector Inject 1 pen under the skin into the appropriate area as directed Every 30 (Thirty) Days.    omeprazole (priLOSEC) 40 MG capsule Take 1 capsule by mouth Daily.    ondansetron (ZOFRAN) 4 MG tablet Take 1 tablet by mouth Every 4 (Four) Hours As Needed for Nausea.    pravastatin (Pravachol) 40 MG tablet Take 1 tablet by mouth Every Night.    sildenafil (Viagra) 50 MG tablet Take 1 tablet by mouth Daily As Needed for  Erectile Dysfunction.    Tirzepatide-Weight Management (ZEPBOUND) 2.5 MG/0.5ML solution auto-injector Inject 0.5 mL under the skin into the appropriate area as directed 1 (One) Time Per Week. (Patient not taking: Reported on 2/21/2025)    triamterene-hydrochlorothiazide (MAXZIDE-25) 37.5-25 MG per tablet Take 1 tablet by mouth Daily As Needed (edema).     No current facility-administered medications on file prior to visit.       Family History   Problem Relation Age of Onset    Diabetes Mother     Hypertension Mother     Atrial fibrillation Father     Colon cancer Sister        Social History     Socioeconomic History    Marital status: Single   Tobacco Use    Smoking status: Never    Smokeless tobacco: Never   Vaping Use    Vaping status: Never Used   Substance and Sexual Activity    Alcohol use: Never    Drug use: Never    Sexual activity: Defer         Review of Systems   Constitutional:  Positive for unexpected weight change (gain). Negative for chills, diaphoresis and fever.   HENT:  Negative for congestion, ear pain, hearing loss, nosebleeds, postnasal drip, sinus pressure and sore throat.    Eyes:  Positive for visual disturbance (better). Negative for pain, discharge and itching.   Respiratory:  Positive for shortness of breath. Negative for chest tightness and wheezing.    Cardiovascular:  Positive for chest pain. Negative for palpitations and leg swelling.   Gastrointestinal:  Negative for abdominal distention, abdominal pain, diarrhea, nausea and vomiting.        10/15 colonoscopy normal   Endocrine: Negative for polydipsia and polyuria.   Genitourinary:  Negative for difficulty urinating, dysuria, frequency and hematuria.   Musculoskeletal:  Positive for arthralgias. Negative for gait problem and myalgias.        Knee pain on R>L   Skin:  Negative for rash and wound.   Neurological:  Negative for dizziness.   Psychiatric/Behavioral:  Positive for behavioral problems, decreased concentration and dysphoric  "mood.        /80   Pulse 79   Ht 177.8 cm (70\")   Wt 126 kg (277 lb)   SpO2 98%   BMI 39.75 kg/m²       Physical Exam  Constitutional:       Appearance: Normal appearance. He is well-developed. He is obese.   HENT:      Head: Normocephalic and atraumatic.      Right Ear: External ear normal.      Left Ear: External ear normal.      Nose: Nose normal.      Mouth/Throat:      Mouth: Mucous membranes are moist.      Pharynx: Oropharynx is clear.   Eyes:      Extraocular Movements: Extraocular movements intact.      Conjunctiva/sclera: Conjunctivae normal.      Pupils: Pupils are equal, round, and reactive to light.   Cardiovascular:      Rate and Rhythm: Normal rate and regular rhythm.      Heart sounds: Normal heart sounds.   Pulmonary:      Effort: Pulmonary effort is normal.      Breath sounds: Normal breath sounds.   Abdominal:      General: Bowel sounds are normal.      Palpations: Abdomen is soft.   Musculoskeletal:         General: Normal range of motion.      Cervical back: Normal range of motion and neck supple.      Comments: Bilateral knee crepitus   Lymphadenopathy:      Cervical: No cervical adenopathy.   Skin:     General: Skin is warm and dry.   Neurological:      General: No focal deficit present.      Mental Status: He is alert and oriented to person, place, and time.   Psychiatric:         Mood and Affect: Mood normal.         Behavior: Behavior normal.         Thought Content: Thought content normal.         Procedure:      Historical Data:    knee pain-diclofenac prn, stable  gerd-stable on PPI  bipolor/schizophrenia-/depresson-advised compliance with meds , controlled  constipation-colace bid, increase fiber, not an issue  Edema-cont maxzide prn, stable  Abnormal lft/?fld-work up noted, recheck noted, advised wt loss  FH of colon ca/rectal bleeding-colonoscopy on 10/23 planned but her never recd the prep, he wants to delay, advised of risk and benefits  Abnormal glucose- AIC soon  MS-per "  Neurology  Obesity-still an issue  Elevated BP-wt loss should help      High LDL-recheck soon, counseled on diet      SOB/CP-labs next week, check cxr and myoview     11/14 Labs noted and dw patient, counseled on low carb/ncs  and low chol    Current Outpatient Medications:     ABILIFY MAINTENA 400 MG Suspension Reconstituted ER IM injection ER, , Disp: , Rfl:     benztropine (COGENTIN) 1 MG tablet, Take 1 tablet by mouth 2 (Two) Times a Day., Disp: , Rfl:     diclofenac (CATAFLAM) 50 MG tablet, TAKE 1 TABLET EVERY 8 HOURS AS NEEDED FOR PAIN, Disp: , Rfl: 0    hydrOXYzine (VISTARIL) 25 MG capsule, 3 (Three) Times a Day As Needed., Disp: , Rfl:     Ofatumumab (Kesimpta) 20 MG/0.4ML solution auto-injector, Inject 1 pen under the skin into the appropriate area as directed Every 30 (Thirty) Days., Disp: , Rfl:     omeprazole (priLOSEC) 40 MG capsule, Take 1 capsule by mouth Daily., Disp: 30 capsule, Rfl: 2    ondansetron (ZOFRAN) 4 MG tablet, Take 1 tablet by mouth Every 4 (Four) Hours As Needed for Nausea., Disp: 30 tablet, Rfl: 0    pravastatin (Pravachol) 40 MG tablet, Take 1 tablet by mouth Every Night., Disp: 90 tablet, Rfl: 3    sildenafil (Viagra) 50 MG tablet, Take 1 tablet by mouth Daily As Needed for Erectile Dysfunction., Disp: 15 tablet, Rfl: 2    Tirzepatide-Weight Management (ZEPBOUND) 2.5 MG/0.5ML solution auto-injector, Inject 0.5 mL under the skin into the appropriate area as directed 1 (One) Time Per Week. (Patient not taking: Reported on 2/21/2025), Disp: 2 mL, Rfl: 2    triamterene-hydrochlorothiazide (MAXZIDE-25) 37.5-25 MG per tablet, Take 1 tablet by mouth Daily As Needed (edema)., Disp: 30 tablet, Rfl: 11        Diagnoses and all orders for this visit:    1. Mixed hyperlipidemia (Primary)  -     Comprehensive Metabolic Panel; Future  -     Lipid Panel; Future  -     TSH; Future    2. Gastroesophageal reflux disease without esophagitis  -     CBC (No Diff); Future    3. Fatty liver disease,  nonalcoholic    4. Bipolar affective disorder, current episode hypomanic    5. MS (multiple sclerosis)    6. Abnormal glucose  -     Hemoglobin A1c; Future    7. SOB (shortness of breath)  -     XR Chest PA & Lateral    8. Chest pain, unspecified type  -     Stress Test With Myocardial Perfusion One Day

## 2025-02-24 ENCOUNTER — PRIOR AUTHORIZATION (OUTPATIENT)
Dept: INTERNAL MEDICINE | Facility: CLINIC | Age: 40
End: 2025-02-24
Payer: MEDICAID

## 2025-02-24 NOTE — TELEPHONE ENCOUNTER
Our guideline named WEGOVY requires the following rule be met for approval:The medication is being prescribed to lower the risk of death from cardiovascular [heart] causes, myocardial infarction [heart attack], or stroke. Please note: Wegovy used for weight loss is considered a benefit exclusion pursuant to the Social Security Act 1927(D)(2)

## 2025-02-25 ENCOUNTER — TELEPHONE (OUTPATIENT)
Dept: INTERNAL MEDICINE | Facility: CLINIC | Age: 40
End: 2025-02-25
Payer: MEDICAID

## 2025-02-25 NOTE — TELEPHONE ENCOUNTER
Patient called, he was at pharmacy and was told that his Wegovy would need a PA. I told him we sent him a [employer] message yesterday that his insurance denied the Wegovy. Patient is requesting call back to speak with someone regarding this.     He will come in the am for labs

## 2025-02-26 ENCOUNTER — LAB (OUTPATIENT)
Dept: INTERNAL MEDICINE | Facility: CLINIC | Age: 40
End: 2025-02-26
Payer: MEDICAID

## 2025-02-26 DIAGNOSIS — E78.2 MIXED HYPERLIPIDEMIA: ICD-10-CM

## 2025-02-26 DIAGNOSIS — K21.9 GASTROESOPHAGEAL REFLUX DISEASE WITHOUT ESOPHAGITIS: ICD-10-CM

## 2025-02-26 DIAGNOSIS — R73.09 ABNORMAL GLUCOSE: ICD-10-CM

## 2025-02-26 LAB
ALBUMIN SERPL-MCNC: 4.3 G/DL (ref 3.5–5.2)
ALBUMIN/GLOB SERPL: 1.4 G/DL
ALP SERPL-CCNC: 98 U/L (ref 39–117)
ALT SERPL W P-5'-P-CCNC: 44 U/L (ref 1–41)
ANION GAP SERPL CALCULATED.3IONS-SCNC: 13.4 MMOL/L (ref 5–15)
AST SERPL-CCNC: 31 U/L (ref 1–40)
BILIRUB SERPL-MCNC: 0.6 MG/DL (ref 0–1.2)
BUN SERPL-MCNC: 15 MG/DL (ref 6–20)
BUN/CREAT SERPL: 15.2 (ref 7–25)
CALCIUM SPEC-SCNC: 9.6 MG/DL (ref 8.6–10.5)
CHLORIDE SERPL-SCNC: 99 MMOL/L (ref 98–107)
CHOLEST SERPL-MCNC: 187 MG/DL (ref 0–200)
CO2 SERPL-SCNC: 26.6 MMOL/L (ref 22–29)
CREAT SERPL-MCNC: 0.99 MG/DL (ref 0.76–1.27)
EGFRCR SERPLBLD CKD-EPI 2021: 98.1 ML/MIN/1.73
GLOBULIN UR ELPH-MCNC: 3.1 GM/DL
GLUCOSE SERPL-MCNC: 91 MG/DL (ref 65–99)
HBA1C MFR BLD: 6.2 % (ref 4.8–5.6)
HDLC SERPL-MCNC: 44 MG/DL (ref 40–60)
LDLC SERPL CALC-MCNC: 128 MG/DL (ref 0–100)
LDLC/HDLC SERPL: 2.89 {RATIO}
POTASSIUM SERPL-SCNC: 3.9 MMOL/L (ref 3.5–5.2)
PROT SERPL-MCNC: 7.4 G/DL (ref 6–8.5)
SODIUM SERPL-SCNC: 139 MMOL/L (ref 136–145)
TRIGL SERPL-MCNC: 79 MG/DL (ref 0–150)
TSH SERPL DL<=0.05 MIU/L-ACNC: 3.15 UIU/ML (ref 0.27–4.2)
VLDLC SERPL-MCNC: 15 MG/DL (ref 5–40)

## 2025-02-26 PROCEDURE — 36415 COLL VENOUS BLD VENIPUNCTURE: CPT | Performed by: INTERNAL MEDICINE

## 2025-02-26 PROCEDURE — 83036 HEMOGLOBIN GLYCOSYLATED A1C: CPT | Performed by: INTERNAL MEDICINE

## 2025-02-26 PROCEDURE — 85027 COMPLETE CBC AUTOMATED: CPT | Performed by: INTERNAL MEDICINE

## 2025-02-26 PROCEDURE — 80053 COMPREHEN METABOLIC PANEL: CPT | Performed by: INTERNAL MEDICINE

## 2025-02-26 PROCEDURE — 84443 ASSAY THYROID STIM HORMONE: CPT | Performed by: INTERNAL MEDICINE

## 2025-02-26 PROCEDURE — 80061 LIPID PANEL: CPT | Performed by: INTERNAL MEDICINE

## 2025-02-27 LAB
DEPRECATED RDW RBC AUTO: 40.5 FL (ref 37–54)
ERYTHROCYTE [DISTWIDTH] IN BLOOD BY AUTOMATED COUNT: 13 % (ref 12.3–15.4)
HCT VFR BLD AUTO: 44 % (ref 37.5–51)
HGB BLD-MCNC: 14.7 G/DL (ref 13–17.7)
MCH RBC QN AUTO: 28.3 PG (ref 26.6–33)
MCHC RBC AUTO-ENTMCNC: 33.4 G/DL (ref 31.5–35.7)
MCV RBC AUTO: 84.6 FL (ref 79–97)
PLATELET # BLD AUTO: 325 10*3/MM3 (ref 140–450)
PMV BLD AUTO: 10 FL (ref 6–12)
RBC # BLD AUTO: 5.2 10*6/MM3 (ref 4.14–5.8)
WBC NRBC COR # BLD AUTO: 8.91 10*3/MM3 (ref 3.4–10.8)

## 2025-02-27 RX ORDER — PRAVASTATIN SODIUM 40 MG
40 TABLET ORAL NIGHTLY
Qty: 90 TABLET | Refills: 3 | Status: SHIPPED | OUTPATIENT
Start: 2025-02-27

## 2025-03-04 ENCOUNTER — HOSPITAL ENCOUNTER (OUTPATIENT)
Dept: CARDIOLOGY | Facility: HOSPITAL | Age: 40
Discharge: HOME | End: 2025-03-04
Payer: MEDICAID

## 2025-03-04 LAB
BH CV REST NUCLEAR ISOTOPE DOSE: 9.7 MCI
BH CV STRESS BP STAGE 2: NORMAL
BH CV STRESS BP STAGE 3: NORMAL
BH CV STRESS COMMENTS STAGE 1: NORMAL
BH CV STRESS DOSE REGADENOSON STAGE 1: 0.4
BH CV STRESS DURATION MIN STAGE 1: 1
BH CV STRESS DURATION MIN STAGE 2: 1
BH CV STRESS DURATION MIN STAGE 3: 1
BH CV STRESS DURATION MIN STAGE 4: 1
BH CV STRESS DURATION SEC STAGE 1: 0
BH CV STRESS DURATION SEC STAGE 2: 0
BH CV STRESS DURATION SEC STAGE 3: 0
BH CV STRESS DURATION SEC STAGE 4: 0
BH CV STRESS GRADE STAGE 1: 10
BH CV STRESS HR STAGE 1: 77
BH CV STRESS HR STAGE 2: 98
BH CV STRESS HR STAGE 3: 90
BH CV STRESS HR STAGE 4: 92
BH CV STRESS NUCLEAR ISOTOPE DOSE: 32.9 MCI
BH CV STRESS O2 STAGE 1: 97
BH CV STRESS O2 STAGE 2: 97
BH CV STRESS O2 STAGE 3: 97
BH CV STRESS O2 STAGE 4: 98
BH CV STRESS PROTOCOL 1: NORMAL
BH CV STRESS RECOVERY BP: NORMAL MMHG
BH CV STRESS RECOVERY HR: 85 BPM
BH CV STRESS RECOVERY O2: 98 %
BH CV STRESS STAGE 1: 1
BH CV STRESS STAGE 2: 2
BH CV STRESS STAGE 3: 3
BH CV STRESS STAGE 4: 4
MAXIMAL PREDICTED HEART RATE: 179 BPM
PERCENT MAX PREDICTED HR: 58.1 %
SPECT HRT GATED+EF W RNC IV: 70 %
STRESS BASELINE BP: NORMAL MMHG
STRESS BASELINE HR: 63 BPM
STRESS O2 SAT REST: 98 %
STRESS PERCENT HR: 68 %
STRESS POST ESTIMATED WORKLOAD: 1.3 METS
STRESS POST EXERCISE DUR MIN: 4 MIN
STRESS POST EXERCISE DUR SEC: 0 SEC
STRESS POST O2 SAT PEAK: 98 %
STRESS POST PEAK BP: NORMAL MMHG
STRESS POST PEAK HR: 104 BPM
STRESS TARGET HR: 152 BPM

## 2025-03-04 PROCEDURE — 93018 CV STRESS TEST I&R ONLY: CPT | Performed by: INTERNAL MEDICINE

## 2025-03-04 PROCEDURE — 93017 CV STRESS TEST TRACING ONLY: CPT

## 2025-03-04 PROCEDURE — A9500 TC99M SESTAMIBI: HCPCS | Performed by: INTERNAL MEDICINE

## 2025-03-04 PROCEDURE — 93016 CV STRESS TEST SUPVJ ONLY: CPT | Performed by: INTERNAL MEDICINE

## 2025-03-04 PROCEDURE — 34310000005 TECHNETIUM SESTAMIBI: Performed by: INTERNAL MEDICINE

## 2025-03-04 PROCEDURE — 78452 HT MUSCLE IMAGE SPECT MULT: CPT | Performed by: INTERNAL MEDICINE

## 2025-03-04 PROCEDURE — 78452 HT MUSCLE IMAGE SPECT MULT: CPT

## 2025-03-04 RX ADMIN — TECHNETIUM TC 99M SESTAMIBI 1 DOSE: 1 INJECTION INTRAVENOUS at 07:45

## 2025-03-04 RX ADMIN — TECHNETIUM TC 99M SESTAMIBI 1 DOSE: 1 INJECTION INTRAVENOUS at 09:55

## 2025-03-26 ENCOUNTER — OFFICE VISIT (OUTPATIENT)
Dept: INTERNAL MEDICINE | Facility: CLINIC | Age: 40
End: 2025-03-26
Payer: MEDICAID

## 2025-03-26 VITALS
OXYGEN SATURATION: 98 % | WEIGHT: 278 LBS | BODY MASS INDEX: 39.8 KG/M2 | HEIGHT: 70 IN | DIASTOLIC BLOOD PRESSURE: 78 MMHG | HEART RATE: 76 BPM | SYSTOLIC BLOOD PRESSURE: 100 MMHG

## 2025-03-26 DIAGNOSIS — E66.01 MORBID (SEVERE) OBESITY DUE TO EXCESS CALORIES: ICD-10-CM

## 2025-03-26 DIAGNOSIS — Z00.00 ROUTINE GENERAL MEDICAL EXAMINATION AT A HEALTH CARE FACILITY: ICD-10-CM

## 2025-03-26 DIAGNOSIS — K76.0 FATTY LIVER DISEASE, NONALCOHOLIC: ICD-10-CM

## 2025-03-26 DIAGNOSIS — F31.0 BIPOLAR AFFECTIVE DISORDER, CURRENT EPISODE HYPOMANIC: ICD-10-CM

## 2025-03-26 DIAGNOSIS — K21.9 GASTROESOPHAGEAL REFLUX DISEASE WITHOUT ESOPHAGITIS: ICD-10-CM

## 2025-03-26 DIAGNOSIS — R60.0 BILATERAL EDEMA OF LOWER EXTREMITY: ICD-10-CM

## 2025-03-26 DIAGNOSIS — G35 MS (MULTIPLE SCLEROSIS): ICD-10-CM

## 2025-03-26 DIAGNOSIS — E78.2 MIXED HYPERLIPIDEMIA: Primary | ICD-10-CM

## 2025-03-26 NOTE — PROGRESS NOTES
Patient is a 40 y.o. male who is here for a follow up of hyperlipdemia  Chief Complaint   Patient presents with    Annual Exam         HPI:    Here for CPE.     History:     Patient Active Problem List   Diagnosis    Anxiety    Bipolar affective disorder    Depression    Gastroesophageal reflux disease without esophagitis    Knee pain    Schizophrenia    Bilateral edema of lower extremity    Abnormal EKG    Fatty liver disease, nonalcoholic    Mixed hyperlipidemia    MS (multiple sclerosis)    Morbid (severe) obesity due to excess calories    Rectal bleeding    Non-recurrent acute suppurative otitis media of left ear without spontaneous rupture of tympanic membrane       Past Medical History:   Diagnosis Date    Abnormal ECG     MS (multiple sclerosis)     Orchitis     Plantar fasciitis        Past Surgical History:   Procedure Laterality Date    LEG SURGERY  05/03/2013    Right Knee       Current Outpatient Medications on File Prior to Visit   Medication Sig    ABILIFY MAINTENA 400 MG Suspension Reconstituted ER IM injection ER     benztropine (COGENTIN) 1 MG tablet Take 1 tablet by mouth 2 (Two) Times a Day.    diclofenac (CATAFLAM) 50 MG tablet TAKE 1 TABLET EVERY 8 HOURS AS NEEDED FOR PAIN    hydrOXYzine (VISTARIL) 25 MG capsule 3 (Three) Times a Day As Needed.    Ofatumumab (Kesimpta) 20 MG/0.4ML solution auto-injector Inject 1 pen under the skin into the appropriate area as directed Every 30 (Thirty) Days.    omeprazole (priLOSEC) 40 MG capsule Take 1 capsule by mouth Daily.    ondansetron (ZOFRAN) 4 MG tablet Take 1 tablet by mouth Every 4 (Four) Hours As Needed for Nausea.    pravastatin (Pravachol) 40 MG tablet Take 1 tablet by mouth Every Night.    sildenafil (Viagra) 50 MG tablet Take 1 tablet by mouth Daily As Needed for Erectile Dysfunction.    triamterene-hydrochlorothiazide (MAXZIDE-25) 37.5-25 MG per tablet Take 1 tablet by mouth Daily As Needed (edema).    Semaglutide-Weight Management (Wegovy) 0.25  "MG/0.5ML solution auto-injector Inject 0.5 mL under the skin into the appropriate area as directed Every 7 (Seven) Days. (Patient not taking: Reported on 3/26/2025)     No current facility-administered medications on file prior to visit.       Family History   Problem Relation Age of Onset    Diabetes Mother     Hypertension Mother     Atrial fibrillation Father     Colon cancer Sister        Social History     Socioeconomic History    Marital status: Single   Tobacco Use    Smoking status: Never    Smokeless tobacco: Never   Vaping Use    Vaping status: Never Used   Substance and Sexual Activity    Alcohol use: Never    Drug use: Never    Sexual activity: Defer         Review of Systems    /78   Pulse 76   Ht 177.8 cm (70\")   Wt 126 kg (278 lb)   SpO2 98%   BMI 39.89 kg/m²       Physical Exam    Procedure:      Historical Data:        HME-counseled on diet and exercise, fasting labs dw patient  knee pain-diclofenac prn, stable  gerd-stable on PPI  bipolor/schizophrenia-/depresson-advised compliance with meds , controlled  constipation-colace bid, increase fiber, not an issue  Edema-cont maxzide prn, stable  Abnormal lft/?fld-work up noted, recheck noted, advised wt loss  FH of colon ca/rectal bleeding-colonoscopy on 10/23 planned but her never recd the prep, he wants to delay, advised of risk and benefits  Abnormal glucose- AIC at goal  MS-per [facility]  Obesity-still an issue, GLP1 cannot be approved  Elevated BP-wt loss should help      High LDL-recheck soon, counseled on diet      CP-reviewed My[facility]     prior Labs noted and dw patient, counseled on low carb/ncs  and low chol    Reviewed the following with the patient: advised patient to avoid alcoholic beverages, encouraged patient to exercise 5-7 days per week for 30 minutes at a time, ideal body weight discussed with patient, and weight loss encouraged.      Current Outpatient Medications:     ABILIFY MAINTENA 400 MG Suspension Reconstituted ER " IM injection ER, , Disp: , Rfl:     benztropine (COGENTIN) 1 MG tablet, Take 1 tablet by mouth 2 (Two) Times a Day., Disp: , Rfl:     diclofenac (CATAFLAM) 50 MG tablet, TAKE 1 TABLET EVERY 8 HOURS AS NEEDED FOR PAIN, Disp: , Rfl: 0    hydrOXYzine (VISTARIL) 25 MG capsule, 3 (Three) Times a Day As Needed., Disp: , Rfl:     Ofatumumab (Kesimpta) 20 MG/0.4ML solution auto-injector, Inject 1 pen under the skin into the appropriate area as directed Every 30 (Thirty) Days., Disp: , Rfl:     omeprazole (priLOSEC) 40 MG capsule, Take 1 capsule by mouth Daily., Disp: 30 capsule, Rfl: 2    ondansetron (ZOFRAN) 4 MG tablet, Take 1 tablet by mouth Every 4 (Four) Hours As Needed for Nausea., Disp: 30 tablet, Rfl: 0    pravastatin (Pravachol) 40 MG tablet, Take 1 tablet by mouth Every Night., Disp: 90 tablet, Rfl: 3    sildenafil (Viagra) 50 MG tablet, Take 1 tablet by mouth Daily As Needed for Erectile Dysfunction., Disp: 15 tablet, Rfl: 2    triamterene-hydrochlorothiazide (MAXZIDE-25) 37.5-25 MG per tablet, Take 1 tablet by mouth Daily As Needed (edema)., Disp: 30 tablet, Rfl: 11    Semaglutide-Weight Management (Wegovy) 0.25 MG/0.5ML solution auto-injector, Inject 0.5 mL under the skin into the appropriate area as directed Every 7 (Seven) Days. (Patient not taking: Reported on 3/26/2025), Disp: 2 mL, Rfl: 2        Diagnoses and all orders for this visit:    1. Mixed hyperlipidemia (Primary)    2. Morbid (severe) obesity due to excess calories    3. Gastroesophageal reflux disease without esophagitis    4. Fatty liver disease, nonalcoholic    5. Bipolar affective disorder, current episode hypomanic    6. MS (multiple sclerosis)    7. Bilateral edema of lower extremity    8. Routine general medical examination at a health care facility

## 2025-06-20 DIAGNOSIS — R06.83 SNORING: Primary | ICD-10-CM

## 2025-08-06 DIAGNOSIS — E66.01 MORBID (SEVERE) OBESITY DUE TO EXCESS CALORIES: Primary | ICD-10-CM

## 2025-08-08 ENCOUNTER — TELEPHONE (OUTPATIENT)
Dept: INTERNAL MEDICINE | Age: 40
End: 2025-08-08
Payer: MEDICAID

## 2025-08-14 ENCOUNTER — LAB (OUTPATIENT)
Dept: INTERNAL MEDICINE | Age: 40
End: 2025-08-14
Payer: MEDICAID

## 2025-08-14 ENCOUNTER — OFFICE VISIT (OUTPATIENT)
Dept: INTERNAL MEDICINE | Age: 40
End: 2025-08-14
Payer: MEDICAID

## 2025-08-14 VITALS
OXYGEN SATURATION: 98 % | HEART RATE: 76 BPM | WEIGHT: 281 LBS | BODY MASS INDEX: 40.23 KG/M2 | HEIGHT: 70 IN | DIASTOLIC BLOOD PRESSURE: 72 MMHG | SYSTOLIC BLOOD PRESSURE: 124 MMHG

## 2025-08-14 DIAGNOSIS — R60.0 BILATERAL EDEMA OF LOWER EXTREMITY: ICD-10-CM

## 2025-08-14 DIAGNOSIS — Z00.00 HEALTHCARE MAINTENANCE: Primary | ICD-10-CM

## 2025-08-14 DIAGNOSIS — K76.0 FATTY LIVER DISEASE, NONALCOHOLIC: ICD-10-CM

## 2025-08-14 DIAGNOSIS — K21.9 GASTROESOPHAGEAL REFLUX DISEASE WITHOUT ESOPHAGITIS: ICD-10-CM

## 2025-08-14 DIAGNOSIS — F31.0 BIPOLAR AFFECTIVE DISORDER, CURRENT EPISODE HYPOMANIC: ICD-10-CM

## 2025-08-14 DIAGNOSIS — R73.09 ABNORMAL GLUCOSE: ICD-10-CM

## 2025-08-14 DIAGNOSIS — G35 MS (MULTIPLE SCLEROSIS): ICD-10-CM

## 2025-08-14 DIAGNOSIS — F20.3 UNDIFFERENTIATED SCHIZOPHRENIA: ICD-10-CM

## 2025-08-14 DIAGNOSIS — E78.2 MIXED HYPERLIPIDEMIA: Primary | ICD-10-CM

## 2025-08-14 DIAGNOSIS — E66.01 MORBID (SEVERE) OBESITY DUE TO EXCESS CALORIES: ICD-10-CM

## 2025-08-14 LAB
ALBUMIN SERPL-MCNC: 4.3 G/DL (ref 3.5–5.2)
ALBUMIN/GLOB SERPL: 1.4 G/DL
ALP SERPL-CCNC: 104 U/L (ref 39–117)
ALT SERPL W P-5'-P-CCNC: 43 U/L (ref 1–41)
ANION GAP SERPL CALCULATED.3IONS-SCNC: 12.3 MMOL/L (ref 5–15)
AST SERPL-CCNC: 27 U/L (ref 1–40)
BILIRUB SERPL-MCNC: 0.8 MG/DL (ref 0–1.2)
BUN SERPL-MCNC: 14 MG/DL (ref 6–20)
BUN/CREAT SERPL: 16.9 (ref 7–25)
CALCIUM SPEC-SCNC: 9.7 MG/DL (ref 8.6–10.5)
CHLORIDE SERPL-SCNC: 101 MMOL/L (ref 98–107)
CHOLEST SERPL-MCNC: 165 MG/DL (ref 0–200)
CK SERPL-CCNC: 92 U/L (ref 20–200)
CO2 SERPL-SCNC: 23.7 MMOL/L (ref 22–29)
CREAT SERPL-MCNC: 0.83 MG/DL (ref 0.76–1.27)
EGFRCR SERPLBLD CKD-EPI 2021: 112.1 ML/MIN/1.73
GLOBULIN UR ELPH-MCNC: 3 GM/DL
GLUCOSE SERPL-MCNC: 109 MG/DL (ref 65–99)
HBA1C MFR BLD: 6 % (ref 4.8–5.6)
HDLC SERPL-MCNC: 44 MG/DL (ref 40–60)
LDLC SERPL CALC-MCNC: 105 MG/DL (ref 0–100)
LDLC/HDLC SERPL: 2.36 {RATIO}
POTASSIUM SERPL-SCNC: 3.9 MMOL/L (ref 3.5–5.2)
PROT SERPL-MCNC: 7.3 G/DL (ref 6–8.5)
SODIUM SERPL-SCNC: 137 MMOL/L (ref 136–145)
TRIGL SERPL-MCNC: 85 MG/DL (ref 0–150)
TSH SERPL DL<=0.05 MIU/L-ACNC: 3.81 UIU/ML (ref 0.27–4.2)
VLDLC SERPL-MCNC: 16 MG/DL (ref 5–40)

## 2025-08-14 PROCEDURE — 83036 HEMOGLOBIN GLYCOSYLATED A1C: CPT | Performed by: INTERNAL MEDICINE

## 2025-08-14 PROCEDURE — 82550 ASSAY OF CK (CPK): CPT | Performed by: INTERNAL MEDICINE

## 2025-08-14 PROCEDURE — 80061 LIPID PANEL: CPT | Performed by: INTERNAL MEDICINE

## 2025-08-14 PROCEDURE — 36415 COLL VENOUS BLD VENIPUNCTURE: CPT | Performed by: INTERNAL MEDICINE

## 2025-08-14 PROCEDURE — 84443 ASSAY THYROID STIM HORMONE: CPT | Performed by: INTERNAL MEDICINE

## 2025-08-14 PROCEDURE — 80053 COMPREHEN METABOLIC PANEL: CPT | Performed by: INTERNAL MEDICINE
